# Patient Record
Sex: MALE | Race: WHITE | Employment: OTHER | URBAN - METROPOLITAN AREA
[De-identification: names, ages, dates, MRNs, and addresses within clinical notes are randomized per-mention and may not be internally consistent; named-entity substitution may affect disease eponyms.]

---

## 2017-01-27 ENCOUNTER — OFFICE VISIT (OUTPATIENT)
Dept: ONCOLOGY | Age: 77
End: 2017-01-27

## 2017-01-27 VITALS
WEIGHT: 200 LBS | DIASTOLIC BLOOD PRESSURE: 90 MMHG | TEMPERATURE: 97.6 F | HEART RATE: 65 BPM | BODY MASS INDEX: 28.7 KG/M2 | OXYGEN SATURATION: 99 % | RESPIRATION RATE: 18 BRPM | SYSTOLIC BLOOD PRESSURE: 179 MMHG

## 2017-01-27 DIAGNOSIS — C17.0 CARCINOMA OF DUODENUM (HCC): Primary | ICD-10-CM

## 2017-01-27 NOTE — PROGRESS NOTES
Follow up Note        Patient: Mallorie Bhardwaj MRN: [id number]  SSN: [id number]    YOB: 1940  Age: 64 y.o. Sex: male        Diagnosis:       1. 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 Adjuvant single agent Capecitabine, single agent   S/P 1 cycles, therapy stopped due to severe diarrhea leading to hospitalization  2. Pancreato-duodenectomy on 01/06/2015      Subjective:      Mallorie Bhardwaj is a 64 y.o. 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 He underwent a pancreato-duodenectomy on 01/06/2015. The tumor in the duodenum was found to be 2.5 cm in size and invaded the pancreatic head and 1/15 LN had disease. He stayed in the hospital until Feb 2015 recovering from the surgery. Mr. Juan Francisco Toussaint elected to receive adjuvant chemotherapy in the form of single agent Capecitabine. He suffered severe diarrhea and I had to discontinue therapy early in the course. Mr. Juan Francisco Toussaint overall is doing well but says he has gained weight and is not moving around like he should. He also said he has lost his glasses and his hearing aid.       Review of Systems:    Constitutional: fatigue  Eyes: negative  Ears, Nose, Mouth, Throat, and Face: negative  Respiratory: negative  Cardiovascular: negative  Gastrointestinal: negative  Integument: negative   Hematologic/Lymphatic: negative  Musculoskeletal:negative  Neurological: negative        Past Medical History   Diagnosis Date    Aneurysm ([address]      aortic    Arrhythmia      A-fib    Calculus of kidney     Cancer ([address]     Chronic kidney disease      stone    Diabetes ([address]     Fast heart beat     Former tobacco use     Hypertension     Muscle weakness     Prostate disorder     Stomach pain      Past Surgical History   Procedure Laterality Date    Hx orthopaedic       neck surgery    Upper gi endoscopy,biopsy  11/26/2014          Upper gi endoscopy,ball dil,30mm  12/2/2014          Upper gi endoscopy,biopsy  12/2/2014          Upper gi endoscopy,diagnosis  1/23/2015          Hx other surgical  1/6/2015     Whipple procedure, G tube, J tube for duodenal cancer    Pr abdomen surgery proc unlisted       Whipple Procedure      Family History   Problem Relation Age of Onset    Cancer Mother     Heart Disease Mother     Stroke Father     Cancer Brother     Diabetes Brother     Heart Disease Brother      Social History   Substance Use Topics    Smoking status: Former Smoker     Packs/day: 0.00     Years: 60.00    Smokeless tobacco: Never Used      Comment: pipe and cigar use currently    Alcohol use No      Comment: quit -14      Prior to Admission medications    Medication Sig Start Date End Date Taking? Authorizing Provider   docusate sodium (COLACE) 100 mg capsule Take 100 mg by mouth nightly. Historical Provider   oxyCODONE-acetaminophen (PERCOCET) 5-325 mg per tablet Take 1 Tab by mouth every four (4) hours as needed. Max Daily Amount: 6 Tabs. 5/13/15   Monty Barnes MD   diphenhydrAMINE (BENADRYL) 25 mg capsule Take 25 mg by mouth every six (6) hours as needed for Itching. Historical Provider   lisinopril (PRINIVIL, ZESTRIL) 20 mg tablet Take 20 mg by mouth daily. Historical Provider   ondansetron hcl (ZOFRAN, AS HYDROCHLORIDE,) 4 mg tablet Take 4 mg by mouth every eight (8) hours as needed for Nausea. Historical Provider   therapeutic multivitamin (THERA) tablet Take 1 Tab by mouth daily. Historical Provider   diphenoxylate-atropine (LOMOTIL) 2.5-0.025 mg per tablet Take 1 Tab by mouth four (4) times daily as needed for Diarrhea. Historical Provider   loperamide (IMODIUM) 2 mg capsule Take 2 mg by mouth as needed for Diarrhea. Historical Provider   simvastatin (ZOCOR) 40 mg tablet Take 40 mg by mouth nightly. 3/30/15   Dominique Vences MD   acetaminophen (TYLENOL) 325 mg tablet Take 2 Tabs by mouth every four (4) hours as needed.  2/9/15   Kam Melgoza MD insulin glargine (LANTUS) 100 unit/mL injection 14 units sq daily  Patient taking differently: 10 Units by SubCUTAneous route daily as needed (only when BS > 180). Only when BS >180 2/9/15   Daxa Rice MD   metoprolol (LOPRESSOR) 50 mg tablet 1 Tab by Per J Tube route two (2) times a day. Patient taking differently: Take 50 mg by mouth two (2) times a day. 2/9/15   Daxa Rice MD   pantoprazole (PROTONIX) 40 mg tablet Take 1 Tab by mouth daily. 2/9/15   Daxa Rice MD          No Known Allergies        Objective:     Vitals:    01/27/17 1448   BP: 179/90   Pulse: 65   Resp: 18   Temp: 97.6 °F (36.4 °C)   TempSrc: Oral   SpO2: 99%   Weight: 200 lb (90.7 kg)          Physical Exam:    GENERAL: alert, cooperative  EYE: negative  LYMPHATIC: Cervical, supraclavicular, and axillary nodes normal.   THROAT & NECK: normal and no erythema or exudates noted. LUNG: clear to auscultation bilaterally  HEART: regular rate and rhythm  ABDOMEN: soft, non-tender  EXTREMITIES: no cyanosis or edema  SKIN: Normal.  NEUROLOGIC: negative        CT Results (most recent):    Results from Hospital Encounter encounter on 11/15/16   CT ABD PELV W CONT   Narrative EXAM:  CT ABDOMEN PELVIS WITH CONTRAST    INDICATION:  BL lower quadrant pain, flank pain, hx of duodenal CA status post  Whipple procedure, appendectomy and colon resection. COMPARISON: None. CONTRAST:      mL of Isovue-370. TECHNIQUE:    Multislice helical CT was performed from the diaphragm to the symphysis pubis  during uneventful rapid bolus intravenous contrast administration. Oral contrast     administered. Contiguous 5 mm axial images were reconstructed and lung and  soft tissue windows were generated. Coronal and sagittal reformations were  generated. CT dose reduction was achieved through use of a standardized protocol  tailored for this examination and automatic exposure control for dose  modulation.       FINDINGS:   Lung bases: Clear. Heart: Normal in size. Liver: Normal in size. No mass or biliary dilatation. Pneumobilia consistent  with prior Whipple procedure and stable. Stable tiny hypodensity in the right  hepatic lobe (image 20), too small to characterize, with no new focal liver  abnormalities. Gallbladder: Surgically absent  Spleen: Normal in size. No mass. Pancreas: Status post Whipple procedure with no obvious residual or recurrent  malignancy. Adrenals: Unremarkable. Kidneys: No solid mass, calculus, or hydronephrosis. Stable cysts in both  kidneys. Stomach: Gastrojejunostomy unchanged. Small bowel: No dilatation or wall thickening. Colon: No dilatation or wall thickening. Severe diverticulosis without acute  inflammation obvious. Appendix: Normal.  Peritoneum: No ascites or pneumoperitoneum. Retroperitoneum: Celiac axis lymph node is stable at 1 cm. Portacaval lymph node  is stable at 1.3 cm no obvious new lymphadenopathy. Reproductive organs: Prostate gland enlargement stable. Urinary bladder: No mass or calculus. Bones: Stable changes of AVN in both hips. Impression IMPRESSION:    1. Status post Whipple procedure with no obvious residual or recurrent  malignancy. 2. Severe sigmoid diverticulosis without obvious diverticulitis. 3. Other stable incidentals as described above. Assessment:     1. 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 ECOG PS 0  Intent of therapy - curative    S/P pancreato-duodenectomy on 01/06/2015  Patient elected to pursue systemic adjuvant chemotherapy  Received 1 cycle of single agent Capecitabine   Therapy stopped due to severe toxicity  On observation alone  In remission        Plan:        1. Repeat CT abdomen/pelvis in 6 months  2. Follow-up in 6 months         Signed by: Jyoti Burton MD                     January 29, 2017        CC. Michael Staples MD  CC.  Neptali Anguiano MD

## 2017-07-24 ENCOUNTER — HOSPITAL ENCOUNTER (OUTPATIENT)
Dept: CT IMAGING | Age: 77
Discharge: HOME | End: 2017-07-24
Attending: INTERNAL MEDICINE
Payer: MEDICARE

## 2017-07-24 DIAGNOSIS — C17.0 CARCINOMA OF DUODENUM (HCC): ICD-10-CM

## 2017-07-24 LAB — CREAT BLD-MCNC: 1.3 MG/DL (ref 0.6–1.3)

## 2017-07-24 PROCEDURE — 74011636320 HC RX REV CODE- 636/320: Performed by: INTERNAL MEDICINE

## 2017-07-24 PROCEDURE — 74011250636 HC RX REV CODE- 250/636: Performed by: INTERNAL MEDICINE

## 2017-07-24 PROCEDURE — 82565 ASSAY OF CREATININE: CPT

## 2017-07-24 PROCEDURE — 74177 CT ABD & PELVIS W/CONTRAST: CPT

## 2017-07-24 PROCEDURE — 74011000255 HC RX REV CODE- 255: Performed by: INTERNAL MEDICINE

## 2017-07-24 RX ORDER — SODIUM CHLORIDE 9 MG/ML
50 INJECTION, SOLUTION INTRAVENOUS
Status: COMPLETED | OUTPATIENT
Start: 2017-07-24 | End: 2017-07-24

## 2017-07-24 RX ORDER — SODIUM CHLORIDE 0.9 % (FLUSH) 0.9 %
10 SYRINGE (ML) INJECTION
Status: COMPLETED | OUTPATIENT
Start: 2017-07-24 | End: 2017-07-24

## 2017-07-24 RX ORDER — BARIUM SULFATE 20 MG/ML
900 SUSPENSION ORAL
Status: COMPLETED | OUTPATIENT
Start: 2017-07-24 | End: 2017-07-24

## 2017-07-24 RX ADMIN — SODIUM CHLORIDE 50 ML/HR: 900 INJECTION, SOLUTION INTRAVENOUS at 12:14

## 2017-07-24 RX ADMIN — IOPAMIDOL 100 ML: 755 INJECTION, SOLUTION INTRAVENOUS at 12:14

## 2017-07-24 RX ADMIN — Medication 10 ML: at 12:14

## 2017-07-24 RX ADMIN — BARIUM SULFATE 900 ML: 21 SUSPENSION ORAL at 12:15

## 2017-07-31 ENCOUNTER — DOCUMENTATION ONLY (OUTPATIENT)
Dept: ONCOLOGY | Age: 77
End: 2017-07-31

## 2017-08-04 ENCOUNTER — OFFICE VISIT (OUTPATIENT)
Dept: ONCOLOGY | Age: 77
End: 2017-08-04

## 2017-08-04 VITALS
SYSTOLIC BLOOD PRESSURE: 174 MMHG | DIASTOLIC BLOOD PRESSURE: 94 MMHG | BODY MASS INDEX: 27.06 KG/M2 | OXYGEN SATURATION: 98 % | WEIGHT: 189 LBS | HEIGHT: 70 IN | TEMPERATURE: 97.6 F | HEART RATE: 61 BPM | RESPIRATION RATE: 18 BRPM

## 2017-08-04 DIAGNOSIS — C17.0 CARCINOMA OF DUODENUM (HCC): Primary | ICD-10-CM

## 2017-08-04 NOTE — PROGRESS NOTES
Follow up Note        Patient: Jean Abraham MRN: [id number]  SSN: [id number]    YOB: 1940  Age: 76 y.o. Sex: male        Diagnosis:       1. 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 Adjuvant single agent Capecitabine, single agent   S/P 1 cycles, therapy stopped due to severe diarrhea leading to hospitalization  2. Pancreato-duodenectomy on 01/06/2015      Subjective:      Jean Abraham is a 76 y.o. 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 He underwent a pancreato-duodenectomy on 01/06/2015. The tumor in the duodenum was found to be 2.5 cm in size and invaded the pancreatic head and 1/15 LN had disease. He stayed in the hospital until Feb 2015 recovering from the surgery. Mr. Jorge Ahuja elected to receive adjuvant chemotherapy in the form of single agent Capecitabine. He suffered severe diarrhea and I had to discontinue therapy early in the course. Mr. Jorge Ahuja is doing well and repeat scans show he remains in remission.     Review of Systems:    Constitutional: fatigue  Eyes: negative  Ears, Nose, Mouth, Throat, and Face: negative  Respiratory: negative  Cardiovascular: negative  Gastrointestinal: negative  Integument: negative   Hematologic/Lymphatic: negative  Musculoskeletal:negative  Neurological: negative        Past Medical History:   Diagnosis Date    Aneurysm ([address]     aortic    Arrhythmia     A-fib    Calculus of kidney     Cancer ([address]     Chronic kidney disease     stone    Diabetes ([address]     Fast heart beat     Former tobacco use     Hypertension     Muscle weakness     Prostate disorder     Stomach pain      Past Surgical History:   Procedure Laterality Date    ABDOMEN SURGERY PROC UNLISTED      Whipple Procedure    HX ORTHOPAEDIC      neck surgery    HX OTHER SURGICAL  1/6/2015    Whipple procedure, G tube, J tube for duodenal cancer    UPPER GI ENDOSCOPY,BULMARO DIL,30MM  12/2/2014         UPPER GI ENDOSCOPY,BIOPSY  11/26/2014         UPPER GI ENDOSCOPY,BIOPSY  12/2/2014         UPPER GI ENDOSCOPY,DIAGNOSIS  1/23/2015           Family History   Problem Relation Age of Onset    Cancer Mother     Heart Disease Mother     Stroke Father     Cancer Brother     Diabetes Brother     Heart Disease Brother      Social History   Substance Use Topics    Smoking status: Former Smoker     Packs/day: 0.00     Years: 60.00    Smokeless tobacco: Never Used      Comment: pipe and cigar use currently    Alcohol use No      Comment: quit -14      Prior to Admission medications    Medication Sig Start Date End Date Taking? Authorizing Provider   docusate sodium (COLACE) 100 mg capsule Take 100 mg by mouth nightly. Yes Historical Provider   oxyCODONE-acetaminophen (PERCOCET) 5-325 mg per tablet Take 1 Tab by mouth every four (4) hours as needed. Max Daily Amount: 6 Tabs. 5/13/15  Yes Trisha Tubbs MD   diphenhydrAMINE (BENADRYL) 25 mg capsule Take 25 mg by mouth every six (6) hours as needed for Itching. Yes Historical Provider   lisinopril (PRINIVIL, ZESTRIL) 20 mg tablet Take 20 mg by mouth daily. Yes Historical Provider   ondansetron hcl (ZOFRAN, AS HYDROCHLORIDE,) 4 mg tablet Take 4 mg by mouth every eight (8) hours as needed for Nausea. Yes Historical Provider   therapeutic multivitamin (THERA) tablet Take 1 Tab by mouth daily. Yes Historical Provider   diphenoxylate-atropine (LOMOTIL) 2.5-0.025 mg per tablet Take 1 Tab by mouth four (4) times daily as needed for Diarrhea. Yes Historical Provider   loperamide (IMODIUM) 2 mg capsule Take 2 mg by mouth as needed for Diarrhea. Yes Historical Provider   simvastatin (ZOCOR) 40 mg tablet Take 40 mg by mouth nightly. 3/30/15  Yes Dominique Vences MD   acetaminophen (TYLENOL) 325 mg tablet Take 2 Tabs by mouth every four (4) hours as needed.  2/9/15  Yes Russel Oates MD   insulin glargine (LANTUS) 100 unit/mL injection 14 units sq daily  Patient taking differently: 10 Units by SubCUTAneous route daily as needed (only when BS > 180). Only when BS >180 2/9/15  Yes Soledad Erazo MD   metoprolol (LOPRESSOR) 50 mg tablet 1 Tab by Per J Tube route two (2) times a day. Patient taking differently: Take 50 mg by mouth two (2) times a day. 2/9/15  Yes Soledad Erazo MD   pantoprazole (PROTONIX) 40 mg tablet Take 1 Tab by mouth daily. 2/9/15   Soledad Erazo MD          No Known Allergies        Objective:     Vitals:    08/04/17 1253   BP: (!) 174/94   Pulse: 61   Resp: 18   Temp: 97.6 °F (36.4 °C)   TempSrc: Oral   SpO2: 98%   Weight: 189 lb (85.7 kg)   Height: 5' 10\" (1.778 m)          Physical Exam:    GENERAL: alert, cooperative  EYE: negative  LYMPHATIC: Cervical, supraclavicular, and axillary nodes normal.   THROAT & NECK: normal and no erythema or exudates noted. LUNG: clear to auscultation bilaterally  HEART: regular rate and rhythm  ABDOMEN: soft, non-tender  EXTREMITIES: no cyanosis or edema  SKIN: Normal.  NEUROLOGIC: negative        CT Results (most recent):    Results from Hospital Encounter encounter on 07/24/17   CT ABD PELV W CONT   Narrative INDICATION:  Adenocarcinoma of the duodenum. Surgery:  Whipple. EXAM: Multiple contiguous helically acquired images were obtained through the  abdomen and pelvis following intravenous 100 ccs and oral contrast  administration. Delayed images may be obtained. Coronal and sagittal  reconstructions were performed. CT dose reduction was achieved through the use of a standardized protocol  tailored for this examination and automatic exposure control for dose  modulation. FINDINGS:     Previous studies for comparison:  November 15, 2016. December 1, 2014. Images through the lower lungs reveal no infiltrate or pleural effusion. Images through the liver reveal no space-occupying lesion or biliary dilatation. Stable tiny hypodensity peripherally unchanged. Pneumobilia again noted. Possible fatty infiltration. There is adequate opacification of the hepatic and portal veins. Images through the gallbladder fossa reveal previous cholecystectomy. Evidence consistent with prior Whipple procedure. No definite residual or  recurrent tumor. Small nodes redemonstrated in the upper abdomen. Prominent  lymph node posterior to the main portal vein segment measures 12.5 mm,  previously 12.8 mm. Celiac axis node measures 10.2 mm, previously 11.0 mm. The spleen is normal in size. The pancreas has a normal postoperative morphologic appearance. Bilateral renal function is demonstrated. No hydronephrosis is present. Slightly  indeterminate appearing left renal exophytic structure measuring 2 cm in  diameter and measuring 62 Hounsfield units. Overall size and appearance is not  appreciably changed. The adrenal glands are normal.    The visualized opacified bowel loops are nonobstructed. Severe sigmoid colonic  diverticulosis redemonstrated. The appendix is normal.    Images through the pelvis reveal mild enlargement of prostate gland. Bone windows demonstrate geographic abnormality in involving both femoral heads  suggesting possible AVN. Incidental focal abdominal aortic aneurysm at and just below the level of the  renal arteries measuring 3.4 x 3.2 cm. Impression IMPRESSION:    Probable fatty infiltration of the liver. Status post Whipple procedure. No evidence of residual or recurrent tumor. Mildly prominent upper abdominal lymph nodes unchanged. Moderate to severe sigmoid colonic diverticulosis. Mild prostate gland enlargement. Changes of AVN involving both hips redemonstrated. Small abdominal aortic aneurysm. 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 Slightly indeterminate appearing left renal exophytic structure which is  unchanged since prior study dated December 2014.  Periodic follow-up recommended. Ultrasound characterization may be helpful if indicated clinically. Assessment:     1. 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 ECOG PS 0  Intent of therapy - curative    S/P pancreato-duodenectomy on 01/06/2015  Patient elected to pursue systemic adjuvant chemotherapy  Received 1 cycle of single agent Capecitabine   Therapy stopped due to severe toxicity  On observation alone  In remission    CT Abd Pelv (7/24/2017): In remission    Symptom management form reviewed with patient. Plan:        > Repeat CT abdomen/pelvis in 6 months  > Follow-up in 6 months       I saw the patient in conjunction with Munir Nascimento NP. Signed by: Faina Thakur MD                     August 4, 2017        CC. Laith Meier MD  CC.  Roberto Torres MD

## 2017-09-18 ENCOUNTER — APPOINTMENT (OUTPATIENT)
Dept: CT IMAGING | Age: 77
DRG: 643 | End: 2017-09-18
Attending: EMERGENCY MEDICINE
Payer: MEDICARE

## 2017-09-18 ENCOUNTER — APPOINTMENT (OUTPATIENT)
Dept: GENERAL RADIOLOGY | Age: 77
DRG: 643 | End: 2017-09-18
Attending: EMERGENCY MEDICINE
Payer: MEDICARE

## 2017-09-18 ENCOUNTER — HOSPITAL ENCOUNTER (INPATIENT)
Age: 77
LOS: 5 days | Discharge: NURSING, REHAB OR LONG-TERM CARE | DRG: 643 | End: 2017-09-24
Attending: EMERGENCY MEDICINE | Admitting: INTERNAL MEDICINE
Payer: MEDICARE

## 2017-09-18 DIAGNOSIS — R50.9 FEVER, UNSPECIFIED FEVER CAUSE: Primary | ICD-10-CM

## 2017-09-18 DIAGNOSIS — E78.5 DYSLIPIDEMIA: ICD-10-CM

## 2017-09-18 DIAGNOSIS — G93.40 ENCEPHALOPATHY ACUTE: ICD-10-CM

## 2017-09-18 DIAGNOSIS — R41.82 ALTERED MENTAL STATUS, UNSPECIFIED ALTERED MENTAL STATUS TYPE: ICD-10-CM

## 2017-09-18 DIAGNOSIS — E23.6 PITUITARY MASS (HCC): ICD-10-CM

## 2017-09-18 LAB
ALBUMIN SERPL-MCNC: 3.5 G/DL (ref 3.5–5)
ALBUMIN/GLOB SERPL: 0.9 {RATIO} (ref 1.1–2.2)
ALP SERPL-CCNC: 110 U/L (ref 45–117)
ALT SERPL-CCNC: 75 U/L (ref 12–78)
ANION GAP SERPL CALC-SCNC: 10 MMOL/L (ref 5–15)
APPEARANCE UR: CLEAR
AST SERPL-CCNC: 53 U/L (ref 15–37)
BACTERIA URNS QL MICRO: NEGATIVE /HPF
BASOPHILS # BLD: 0 K/UL (ref 0–0.1)
BASOPHILS NFR BLD: 1 % (ref 0–1)
BILIRUB SERPL-MCNC: 0.5 MG/DL (ref 0.2–1)
BILIRUB UR QL: NEGATIVE
BUN SERPL-MCNC: 17 MG/DL (ref 6–20)
BUN/CREAT SERPL: 17 (ref 12–20)
CALCIUM SERPL-MCNC: 8.3 MG/DL (ref 8.5–10.1)
CHLORIDE SERPL-SCNC: 100 MMOL/L (ref 97–108)
CO2 SERPL-SCNC: 23 MMOL/L (ref 21–32)
COLOR UR: ABNORMAL
CREAT SERPL-MCNC: 0.98 MG/DL (ref 0.7–1.3)
EOSINOPHIL # BLD: 0.1 K/UL (ref 0–0.4)
EOSINOPHIL NFR BLD: 1 % (ref 0–7)
EPITH CASTS URNS QL MICRO: ABNORMAL /LPF
ERYTHROCYTE [DISTWIDTH] IN BLOOD BY AUTOMATED COUNT: 12.7 % (ref 11.5–14.5)
GLOBULIN SER CALC-MCNC: 3.8 G/DL (ref 2–4)
GLUCOSE BLD STRIP.AUTO-MCNC: 150 MG/DL (ref 65–100)
GLUCOSE SERPL-MCNC: 128 MG/DL (ref 65–100)
GLUCOSE UR STRIP.AUTO-MCNC: NEGATIVE MG/DL
HCT VFR BLD AUTO: 39.2 % (ref 36.6–50.3)
HGB BLD-MCNC: 14 G/DL (ref 12.1–17)
HGB UR QL STRIP: NEGATIVE
KETONES UR QL STRIP.AUTO: NEGATIVE MG/DL
LACTATE SERPL-SCNC: 1.1 MMOL/L (ref 0.4–2)
LEUKOCYTE ESTERASE UR QL STRIP.AUTO: NEGATIVE
LIPASE SERPL-CCNC: 92 U/L (ref 73–393)
LYMPHOCYTES # BLD: 1.3 K/UL (ref 0.8–3.5)
LYMPHOCYTES NFR BLD: 15 % (ref 12–49)
MCH RBC QN AUTO: 31.7 PG (ref 26–34)
MCHC RBC AUTO-ENTMCNC: 35.7 G/DL (ref 30–36.5)
MCV RBC AUTO: 88.9 FL (ref 80–99)
MONOCYTES # BLD: 0.9 K/UL (ref 0–1)
MONOCYTES NFR BLD: 10 % (ref 5–13)
NEUTS SEG # BLD: 6.5 K/UL (ref 1.8–8)
NEUTS SEG NFR BLD: 73 % (ref 32–75)
NITRITE UR QL STRIP.AUTO: NEGATIVE
PH UR STRIP: 6.5 [PH] (ref 5–8)
PLATELET # BLD AUTO: 277 K/UL (ref 150–400)
POTASSIUM SERPL-SCNC: 3.6 MMOL/L (ref 3.5–5.1)
PROT SERPL-MCNC: 7.3 G/DL (ref 6.4–8.2)
PROT UR STRIP-MCNC: 300 MG/DL
RBC # BLD AUTO: 4.41 M/UL (ref 4.1–5.7)
RBC #/AREA URNS HPF: ABNORMAL /HPF (ref 0–5)
SERVICE CMNT-IMP: ABNORMAL
SODIUM SERPL-SCNC: 133 MMOL/L (ref 136–145)
SP GR UR REFRACTOMETRY: 1.02 (ref 1–1.03)
UA: UC IF INDICATED,UAUC: ABNORMAL
UROBILINOGEN UR QL STRIP.AUTO: 1 EU/DL (ref 0.2–1)
WBC # BLD AUTO: 8.7 K/UL (ref 4.1–11.1)
WBC URNS QL MICRO: ABNORMAL /HPF (ref 0–4)

## 2017-09-18 PROCEDURE — 81001 URINALYSIS AUTO W/SCOPE: CPT | Performed by: EMERGENCY MEDICINE

## 2017-09-18 PROCEDURE — 93005 ELECTROCARDIOGRAM TRACING: CPT

## 2017-09-18 PROCEDURE — 74011250636 HC RX REV CODE- 250/636: Performed by: EMERGENCY MEDICINE

## 2017-09-18 PROCEDURE — 87040 BLOOD CULTURE FOR BACTERIA: CPT | Performed by: EMERGENCY MEDICINE

## 2017-09-18 PROCEDURE — 74177 CT ABD & PELVIS W/CONTRAST: CPT

## 2017-09-18 PROCEDURE — 85025 COMPLETE CBC W/AUTO DIFF WBC: CPT | Performed by: EMERGENCY MEDICINE

## 2017-09-18 PROCEDURE — 96360 HYDRATION IV INFUSION INIT: CPT

## 2017-09-18 PROCEDURE — 99285 EMERGENCY DEPT VISIT HI MDM: CPT

## 2017-09-18 PROCEDURE — 74011636320 HC RX REV CODE- 636/320: Performed by: EMERGENCY MEDICINE

## 2017-09-18 PROCEDURE — 87804 INFLUENZA ASSAY W/OPTIC: CPT | Performed by: EMERGENCY MEDICINE

## 2017-09-18 PROCEDURE — 94761 N-INVAS EAR/PLS OXIMETRY MLT: CPT

## 2017-09-18 PROCEDURE — 82962 GLUCOSE BLOOD TEST: CPT

## 2017-09-18 PROCEDURE — 96361 HYDRATE IV INFUSION ADD-ON: CPT

## 2017-09-18 PROCEDURE — 83605 ASSAY OF LACTIC ACID: CPT | Performed by: EMERGENCY MEDICINE

## 2017-09-18 PROCEDURE — 36415 COLL VENOUS BLD VENIPUNCTURE: CPT | Performed by: EMERGENCY MEDICINE

## 2017-09-18 PROCEDURE — 83690 ASSAY OF LIPASE: CPT | Performed by: EMERGENCY MEDICINE

## 2017-09-18 PROCEDURE — 71010 XR CHEST PORT: CPT

## 2017-09-18 PROCEDURE — 80053 COMPREHEN METABOLIC PANEL: CPT | Performed by: EMERGENCY MEDICINE

## 2017-09-18 RX ORDER — SULFAMETHOXAZOLE AND TRIMETHOPRIM 800; 160 MG/1; MG/1
1 TABLET ORAL 2 TIMES DAILY
Status: ON HOLD | COMMUNITY
End: 2017-09-21

## 2017-09-18 RX ORDER — MELOXICAM 7.5 MG/1
TABLET ORAL DAILY
Status: ON HOLD | COMMUNITY
End: 2017-09-21

## 2017-09-18 RX ORDER — SODIUM CHLORIDE 9 MG/ML
50 INJECTION, SOLUTION INTRAVENOUS
Status: COMPLETED | OUTPATIENT
Start: 2017-09-18 | End: 2017-09-19

## 2017-09-18 RX ORDER — GUAIFENESIN 100 MG/5ML
81 LIQUID (ML) ORAL DAILY
Status: ON HOLD | COMMUNITY
End: 2017-09-21

## 2017-09-18 RX ORDER — SODIUM CHLORIDE 0.9 % (FLUSH) 0.9 %
5-10 SYRINGE (ML) INJECTION AS NEEDED
Status: DISCONTINUED | OUTPATIENT
Start: 2017-09-18 | End: 2017-09-19

## 2017-09-18 RX ORDER — GLIMEPIRIDE 2 MG/1
2 TABLET ORAL 2 TIMES DAILY
COMMUNITY

## 2017-09-18 RX ORDER — SODIUM CHLORIDE 0.9 % (FLUSH) 0.9 %
10 SYRINGE (ML) INJECTION
Status: COMPLETED | OUTPATIENT
Start: 2017-09-18 | End: 2017-09-18

## 2017-09-18 RX ADMIN — Medication 10 ML: at 22:37

## 2017-09-18 RX ADMIN — SODIUM CHLORIDE 50 ML/HR: 900 INJECTION, SOLUTION INTRAVENOUS at 22:37

## 2017-09-18 RX ADMIN — SODIUM CHLORIDE 2448 ML: 900 INJECTION, SOLUTION INTRAVENOUS at 21:56

## 2017-09-18 RX ADMIN — IOPAMIDOL 100 ML: 755 INJECTION, SOLUTION INTRAVENOUS at 22:37

## 2017-09-18 NOTE — IP AVS SNAPSHOT
[address] [facility] 
[phone] Patient: Lorin Ayala MRN: [id number] [id number] You are allergic to the following No active allergies Immunizations Administered for This Admission Name Date Influenza Vaccine (Quad) PF 9/19/2017 Recent Documentation Height Weight BMI Smoking Status 1.803 m 82 kg 25.21 kg/m2 Former Smoker Unresulted Labs Order Current Status PROLACTIN In process Emergency Contacts  (Rel.) Home Phone Work Phone Mobile Phone Adal Olivo [phone] Adal Olivo (Child) [phone] -- -- About your hospitalization You were admitted on:  September 19, 2017 You last received care in the:  [address] You were discharged on:  September 24, 2017 Why you were hospitalized Your primary diagnosis was:  Not on File Your diagnoses also included:  Fever Providers Seen During Your Hospitalizations Provider Role Specialty Primary office phone Flakita Rojas DO Attending Provider Emergency Medicine [phone] Patti Al MD Attending Provider Internal Medicine [phone] Faith Hanson MD Attending Provider Internal Medicine [phone] Your Primary Care Physician (PCP) Primary Care Physician Office Phone Office Fax Natalya Karen [phone] Follow-up Information Follow up With Details Comments Contact Info Narayan Silva MD Schedule an appointment as soon as possible for a visit in 1 week PCP [facility] 
[phone] Schedule an appointment as soon as possible for a visit in 1 week Vidhya Ramirez MD Schedule an appointment as soon as possible for a visit in 1 week  [address] 
[phone] Devonte Tapia MD Schedule an appointment as soon as possible for a visit in 1 week  [facility] [address] [place] 
[phone] Current Discharge Medication List  
  
CONTINUE these medications which have CHANGED Dose & Instructions Dispensing Information Comments Morning Noon Evening Bedtime  
 aspirin 81 mg chewable tablet What changed:  Another medication with the same name was removed. Continue taking this medication, and follow the directions you see here. Your last dose was: Your next dose is:    
   
   
 Dose:  81 mg Take 1 Tab by mouth daily for 30 days. Quantity:  30 Tab Refills:  1  
     
   
   
   
  
 lisinopril 40 mg tablet Commonly known as:  Tremaine Human What changed:   
- medication strength 
- how much to take Your last dose was: Your next dose is:    
   
   
 Dose:  40 mg Take 1 Tab by mouth daily for 30 days. Quantity:  30 Tab Refills:  1 therapeutic multivitamin tablet Commonly known as:  THERA What changed:  Another medication with the same name was removed. Continue taking this medication, and follow the directions you see here. Your last dose was: Your next dose is:    
   
   
 Dose:  1 Tab Take 1 Tab by mouth daily for 30 days. Quantity:  30 Tab Refills:  1 CONTINUE these medications which have NOT CHANGED Dose & Instructions Dispensing Information Comments Morning Noon Evening Bedtime  
 acetaminophen 325 mg tablet Commonly known as:  TYLENOL Your last dose was: Your next dose is:    
   
   
 Dose:  650 mg Take 2 Tabs by mouth every four (4) hours as needed. Quantity:  1 Tab Refills:  0  
     
   
   
   
  
 BENADRYL 25 mg capsule Generic drug:  diphenhydrAMINE Your last dose was:     
   
Your next dose is:    
   
   
 Dose:  25 mg  
 Take 25 mg by mouth every six (6) hours as needed for Sleep. Refills:  0  
     
   
   
   
  
 glimepiride 2 mg tablet Commonly known as:  AMARYL Your last dose was: Your next dose is:    
   
   
 Dose:  2 mg Take 2 mg by mouth every morning. Refills:  0 LOPRESSOR 50 mg tablet Generic drug:  metoprolol tartrate Your last dose was: Your next dose is:    
   
   
 Dose:  50 mg Take 50 mg by mouth two (2) times a day. Refills:  0  
     
   
   
   
  
 pantoprazole 40 mg tablet Commonly known as:  PROTONIX Your last dose was: Your next dose is:    
   
   
 Dose:  40 mg Take 1 Tab by mouth daily. Quantity:  30 Tab Refills:  0  
     
   
   
   
  
 simvastatin 40 mg tablet Commonly known as:  ZOCOR Your last dose was: Your next dose is:    
   
   
 Dose:  40 mg Take 40 mg by mouth nightly. Refills:  0 Where to Get Your Medications These medications were sent to [address] [place] [address] [place], [address]  [address] [facility], [address] [place] [id number] Phone:  [phone] therapeutic multivitamin tablet Information on where to get these meds will be given to you by the nurse or doctor. ! Ask your nurse or doctor about these medications  
  aspirin 81 mg chewable tablet  
 lisinopril 40 mg tablet Discharge Instructions Diet: Heart Health Activity: As tolerated Please come back to ED if symptoms get worse. Discharge Orders None Introducing [facility]! Trenton Ceron introduces [employer] patient portal. Now you can access parts of your medical record, email your doctor's office, and request medication refills online. 1. In your internet browser, go to [email or web address] 2. Click on the First Time User? Click Here link in the Sign In box.  You will see the New Member Sign Up page. 3. Enter your [employer] Access Code exactly as it appears below. You will not need to use this code after youve completed the sign-up process. If you do not sign up before the expiration date, you must request a new code. · [employer] Access Code: [email or web address] Expires: 9/24/2017  2:30 PM 
 
4. Enter the last four digits of your Social Security Number (xxxx) and Date of Birth (mm/dd/yyyy) as indicated and click Submit. You will be taken to the next sign-up page. 5. Create a [employer] ID. This will be your [employer] login ID and cannot be changed, so think of one that is secure and easy to remember. 6. Create a [employer] password. You can change your password at any time. 7. Enter your Password Reset Question and Answer. This can be used at a later time if you forget your password. 8. Enter your e-mail address. You will receive e-mail notification when new information is available in [address] 9. Click Sign Up. You can now view and download portions of your medical record. 10. Click the Download Summary menu link to download a portable copy of your medical information. If you have questions, please visit the Frequently Asked Questions section of the [employer] website. Remember, [employer] is NOT to be used for urgent needs. For medical emergencies, dial 911. Now available from your iPhone and Android! General Information Please provide this summary of care documentation to your next provider. Patient Signature:  ____________________________________________________________ Date:  ____________________________________________________________  
  
Mikayla Walker Provider Signature:  ____________________________________________________________ Date:  ____________________________________________________________

## 2017-09-19 ENCOUNTER — APPOINTMENT (OUTPATIENT)
Dept: CT IMAGING | Age: 77
DRG: 643 | End: 2017-09-19
Attending: INTERNAL MEDICINE
Payer: MEDICARE

## 2017-09-19 PROBLEM — R50.9 FEVER: Status: ACTIVE | Noted: 2017-09-19

## 2017-09-19 LAB
ATRIAL RATE: 84 BPM
BASOPHILS # BLD: 0 K/UL (ref 0–0.1)
BASOPHILS NFR BLD: 0 % (ref 0–1)
CALCULATED P AXIS, ECG09: 101 DEGREES
CALCULATED R AXIS, ECG10: 54 DEGREES
CALCULATED T AXIS, ECG11: 67 DEGREES
DIAGNOSIS, 93000: NORMAL
EOSINOPHIL # BLD: 0 K/UL (ref 0–0.4)
EOSINOPHIL NFR BLD: 1 % (ref 0–7)
ERYTHROCYTE [DISTWIDTH] IN BLOOD BY AUTOMATED COUNT: 12.9 % (ref 11.5–14.5)
FLUAV AG NPH QL IA: NEGATIVE
FLUBV AG NOSE QL IA: NEGATIVE
GLUCOSE BLD STRIP.AUTO-MCNC: 134 MG/DL (ref 65–100)
GLUCOSE BLD STRIP.AUTO-MCNC: 155 MG/DL (ref 65–100)
GLUCOSE BLD STRIP.AUTO-MCNC: 161 MG/DL (ref 65–100)
GLUCOSE BLD STRIP.AUTO-MCNC: 204 MG/DL (ref 65–100)
HCT VFR BLD AUTO: 35.8 % (ref 36.6–50.3)
HGB BLD-MCNC: 12.2 G/DL (ref 12.1–17)
LYMPHOCYTES # BLD: 1.9 K/UL (ref 0.8–3.5)
LYMPHOCYTES NFR BLD: 27 % (ref 12–49)
MCH RBC QN AUTO: 30.4 PG (ref 26–34)
MCHC RBC AUTO-ENTMCNC: 34.1 G/DL (ref 30–36.5)
MCV RBC AUTO: 89.3 FL (ref 80–99)
MONOCYTES # BLD: 0.8 K/UL (ref 0–1)
MONOCYTES NFR BLD: 12 % (ref 5–13)
NEUTS SEG # BLD: 4.2 K/UL (ref 1.8–8)
NEUTS SEG NFR BLD: 60 % (ref 32–75)
P-R INTERVAL, ECG05: 206 MS
PLATELET # BLD AUTO: 274 K/UL (ref 150–400)
Q-T INTERVAL, ECG07: 400 MS
QRS DURATION, ECG06: 84 MS
QTC CALCULATION (BEZET), ECG08: 472 MS
RBC # BLD AUTO: 4.01 M/UL (ref 4.1–5.7)
SERVICE CMNT-IMP: ABNORMAL
VENTRICULAR RATE, ECG03: 84 BPM
WBC # BLD AUTO: 7 K/UL (ref 4.1–11.1)

## 2017-09-19 PROCEDURE — 74011250636 HC RX REV CODE- 250/636: Performed by: GENERAL ACUTE CARE HOSPITAL

## 2017-09-19 PROCEDURE — 74011636637 HC RX REV CODE- 636/637: Performed by: GENERAL ACUTE CARE HOSPITAL

## 2017-09-19 PROCEDURE — 36415 COLL VENOUS BLD VENIPUNCTURE: CPT | Performed by: GENERAL ACUTE CARE HOSPITAL

## 2017-09-19 PROCEDURE — 90471 IMMUNIZATION ADMIN: CPT

## 2017-09-19 PROCEDURE — 90686 IIV4 VACC NO PRSV 0.5 ML IM: CPT | Performed by: GENERAL ACUTE CARE HOSPITAL

## 2017-09-19 PROCEDURE — 65270000015 HC RM PRIVATE ONCOLOGY

## 2017-09-19 PROCEDURE — 99218 HC RM OBSERVATION: CPT

## 2017-09-19 PROCEDURE — 74011250637 HC RX REV CODE- 250/637: Performed by: GENERAL ACUTE CARE HOSPITAL

## 2017-09-19 PROCEDURE — 82962 GLUCOSE BLOOD TEST: CPT

## 2017-09-19 PROCEDURE — 70450 CT HEAD/BRAIN W/O DYE: CPT

## 2017-09-19 PROCEDURE — 85025 COMPLETE CBC W/AUTO DIFF WBC: CPT | Performed by: GENERAL ACUTE CARE HOSPITAL

## 2017-09-19 RX ORDER — ENOXAPARIN SODIUM 100 MG/ML
40 INJECTION SUBCUTANEOUS DAILY
Status: DISCONTINUED | OUTPATIENT
Start: 2017-09-19 | End: 2017-09-24 | Stop reason: HOSPADM

## 2017-09-19 RX ORDER — SODIUM CHLORIDE 0.9 % (FLUSH) 0.9 %
5-10 SYRINGE (ML) INJECTION EVERY 8 HOURS
Status: DISCONTINUED | OUTPATIENT
Start: 2017-09-19 | End: 2017-09-24 | Stop reason: HOSPADM

## 2017-09-19 RX ORDER — ACETAMINOPHEN 325 MG/1
650 TABLET ORAL
Status: DISCONTINUED | OUTPATIENT
Start: 2017-09-19 | End: 2017-09-24 | Stop reason: HOSPADM

## 2017-09-19 RX ORDER — ATORVASTATIN CALCIUM 40 MG/1
40 TABLET, FILM COATED ORAL
Status: DISCONTINUED | OUTPATIENT
Start: 2017-09-19 | End: 2017-09-24 | Stop reason: HOSPADM

## 2017-09-19 RX ORDER — THERA TABS 400 MCG
1 TAB ORAL DAILY
Status: DISCONTINUED | OUTPATIENT
Start: 2017-09-19 | End: 2017-09-24 | Stop reason: HOSPADM

## 2017-09-19 RX ORDER — LISINOPRIL 20 MG/1
20 TABLET ORAL DAILY
Status: DISCONTINUED | OUTPATIENT
Start: 2017-09-19 | End: 2017-09-21

## 2017-09-19 RX ORDER — GUAIFENESIN 100 MG/5ML
81 LIQUID (ML) ORAL DAILY
Status: DISCONTINUED | OUTPATIENT
Start: 2017-09-19 | End: 2017-09-24 | Stop reason: HOSPADM

## 2017-09-19 RX ORDER — DEXTROSE 50 % IN WATER (D50W) INTRAVENOUS SYRINGE
12.5-25 AS NEEDED
Status: DISCONTINUED | OUTPATIENT
Start: 2017-09-19 | End: 2017-09-23 | Stop reason: CLARIF

## 2017-09-19 RX ORDER — DIPHENHYDRAMINE HCL 25 MG
25 CAPSULE ORAL
Status: DISCONTINUED | OUTPATIENT
Start: 2017-09-19 | End: 2017-09-24 | Stop reason: HOSPADM

## 2017-09-19 RX ORDER — INSULIN LISPRO 100 [IU]/ML
INJECTION, SOLUTION INTRAVENOUS; SUBCUTANEOUS
Status: DISCONTINUED | OUTPATIENT
Start: 2017-09-19 | End: 2017-09-20

## 2017-09-19 RX ORDER — SODIUM CHLORIDE 0.9 % (FLUSH) 0.9 %
5-10 SYRINGE (ML) INJECTION AS NEEDED
Status: DISCONTINUED | OUTPATIENT
Start: 2017-09-19 | End: 2017-09-24 | Stop reason: HOSPADM

## 2017-09-19 RX ORDER — MAGNESIUM SULFATE 100 %
4 CRYSTALS MISCELLANEOUS AS NEEDED
Status: DISCONTINUED | OUTPATIENT
Start: 2017-09-19 | End: 2017-09-24 | Stop reason: HOSPADM

## 2017-09-19 RX ADMIN — THERA TABS 1 TABLET: TAB at 08:19

## 2017-09-19 RX ADMIN — ATORVASTATIN CALCIUM 40 MG: 40 TABLET, FILM COATED ORAL at 21:49

## 2017-09-19 RX ADMIN — INSULIN LISPRO 3 UNITS: 100 INJECTION, SOLUTION INTRAVENOUS; SUBCUTANEOUS at 16:16

## 2017-09-19 RX ADMIN — INFLUENZA VIRUS VACCINE 0.5 ML: 15; 15; 15; 15 SUSPENSION INTRAMUSCULAR at 08:20

## 2017-09-19 RX ADMIN — ENOXAPARIN SODIUM 40 MG: 40 INJECTION SUBCUTANEOUS at 08:20

## 2017-09-19 RX ADMIN — Medication 10 ML: at 21:49

## 2017-09-19 RX ADMIN — ASPIRIN 81 MG CHEWABLE TABLET 81 MG: 81 TABLET CHEWABLE at 08:19

## 2017-09-19 RX ADMIN — Medication 10 ML: at 14:04

## 2017-09-19 RX ADMIN — Medication 10 ML: at 05:25

## 2017-09-19 RX ADMIN — LISINOPRIL 20 MG: 20 TABLET ORAL at 08:25

## 2017-09-19 RX ADMIN — ACETAMINOPHEN 650 MG: 325 TABLET ORAL at 03:31

## 2017-09-19 RX ADMIN — DIPHENHYDRAMINE HYDROCHLORIDE 25 MG: 25 CAPSULE ORAL at 21:55

## 2017-09-19 NOTE — ED NOTES
Hourly rounds completed. Concerns and questions addressed at this time. Pt in no acute distress at this time. Call bell within reach. Side rails x 2. Cardiac Monitor x 3. Stretcher locked in lowest position. Pt turns self.

## 2017-09-19 NOTE — ED NOTES
Hourly rounds completed. Concerns and questions addressed at this time. Pt in no acute distress at this time. Call bell within reach. Side rails x 2. Cardiac Monitor x 3. Stretcher locked in lowest position. Pt turns self in bed.

## 2017-09-19 NOTE — PROGRESS NOTES
TRANSFER - IN REPORT:    Verbal report received from Amber(name) on Tashia Valdes  being received from ED(unit) for routine progression of care      Report consisted of patients Situation, Background, Assessment and   Recommendations(SBAR). Information from the following report(s) SBAR, Kardex, Intake/Output, MAR and Recent Results was reviewed with the receiving nurse. Opportunity for questions and clarification was provided. Assessment completed upon patients arrival to unit and care assumed.      Primary Nurse Adaline Hashimoto and Louisa Crigler, RN performed a dual skin assessment on this patient No impairment noted  Brock score is 20

## 2017-09-19 NOTE — ED NOTES
TRANSFER - OUT REPORT:    Verbal report given to Angelica Nava RN on Merari Pollard  being transferred to [address] [place], [place] [id number] Oncology  for routine progression of care       Report consisted of patients Situation, Background, Assessment and   Recommendations(SBAR). Information from the following report(s) SBAR, ED Summary, [facility] and Recent Results was reviewed with the receiving nurse. Lines:   Peripheral IV 09/18/17 Left Hand (Active)   Site Assessment Clean, dry, & intact 9/18/2017  9:14 PM   Infiltration Assessment 0 9/18/2017  9:14 PM   Dressing Status Clean, dry, & intact; Occlusive 9/18/2017  9:14 PM   Dressing Type Transparent 9/18/2017  9:14 PM   Hub Color/Line Status Pink 9/18/2017  9:14 PM       Peripheral IV 09/18/17 Right Forearm (Active)   Site Assessment Clean, dry, & intact 9/18/2017 10:26 PM   Phlebitis Assessment 0 9/18/2017 10:26 PM   Infiltration Assessment 0 9/18/2017 10:26 PM   Dressing Status Clean, dry, & intact 9/18/2017 10:26 PM        Opportunity for questions and clarification was provided.       Patient transported with:   [employer]

## 2017-09-19 NOTE — ED NOTES
Pt provided with lean cuisine and orange juice. Hourly rounds completed. Concerns and questions addressed at this time. Pt in no acute distress at this time. Call bell within reach. Side rails x 2. Cardiac Monitor x 2. Stretcher locked in lowest position.

## 2017-09-19 NOTE — ED PROVIDER NOTES
HPI Comments: Christian Mike is a 76 y.o. 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 Pt reports his son brought him to the ED, but states he is unsure why. He notes having abdominal pain. Per son, pt complained of nausea and weakness since last night. Son notes pt presenting with AMS, stating pt put on dirty socks. Son denies pt having any one sided weakness, slurred speech or facial droop. Social hx: -(former) Tobacco use, - EtOH use, - Illicit drug use    PCP: Michael Phillips MD    History of present illness is limited due to pt's AMS. The history is provided by the patient and a relative. No  was used. Past Medical History:   Diagnosis Date    Aneurysm ([address]     aortic    Arrhythmia     A-fib    Calculus of kidney     Cancer ([address]     Chronic kidney disease     stone    Diabetes ([address]     Fast heart beat     Former tobacco use     Hypertension     Muscle weakness     Prostate disorder     Stomach pain        Past Surgical History:   Procedure Laterality Date    ABDOMEN SURGERY PROC UNLISTED      Whipple Procedure    HX ORTHOPAEDIC      neck surgery    HX OTHER SURGICAL  1/6/2015    Whipple procedure, G tube, J tube for duodenal cancer    UPPER GI ENDOSCOPY,BALL DIL,30MM  12/2/2014         UPPER GI ENDOSCOPY,BIOPSY  11/26/2014         UPPER GI ENDOSCOPY,BIOPSY  12/2/2014         UPPER GI ENDOSCOPY,DIAGNOSIS  1/23/2015              Family History:   Problem Relation Age of Onset    Cancer Mother     Heart Disease Mother     Stroke Father     Cancer Brother     Diabetes Brother     Heart Disease Brother        Social History     Social History    Marital status:      Spouse name: N/A    Number of children: N/A    Years of education: N/A     Occupational History    Not on file.      Social History Main Topics    Smoking status: Former Smoker     Packs/day: 0.00     Years: 60.00    Smokeless tobacco: Never Used      Comment: pipe and cigar use currently    Alcohol use No      Comment: quit -14    Drug use: No    Sexual activity: Not on file     Other Topics Concern    Not on file     Social History Narrative         ALLERGIES: Review of patient's allergies indicates no known allergies. Review of Systems   Unable to perform ROS: Mental status change       Patient Vitals for the past 12 hrs:   Temp Pulse Resp BP SpO2   09/19/17 2251 98.9 °F (37.2 °C) 67 16 158/83 100 %   09/19/17 1937 99.6 °F (37.6 °C) 64 16 116/58 95 %   09/19/17 1508 98.8 °F (37.1 °C) 73 16 102/61 95 %       Physical Exam   Constitutional: He appears well-developed and well-nourished. Rectal temp 101.4F   HENT:   Head: Normocephalic and atraumatic. Mouth/Throat: Oropharynx is clear and moist.   Eyes: Conjunctivae and EOM are normal. Pupils are equal, round, and reactive to light. Neck: Normal range of motion. Neck supple. No tracheal deviation present. Cardiovascular: Normal rate, regular rhythm, normal heart sounds and intact distal pulses. No murmur heard. Pulmonary/Chest: Effort normal. No respiratory distress. He has no wheezes. He has no rales. Diminished lung sounds on the left. Abdominal: Soft. Bowel sounds are normal. There is no rebound and no guarding. Mild abdominal tenderness. Surgical scar to center. Musculoskeletal: He exhibits no edema or deformity. Neurological: He is alert. GCS eye subscore is 4. GCS verbal subscore is 5. GCS motor subscore is 6. Oriented to self and place, not situation. Skin: Skin is warm and dry. Psychiatric: He has a normal mood and affect. His speech is normal.   Nursing note and vitals reviewed.        MDM  Number of Diagnoses or Management Options  Encephalopathy acute:   Fever, unspecified fever cause:   Diagnosis management comments: DDx: intraabdominal infection, PNA, UTI, doubt meningitis, no evidence of cellulitis on PE      No obvious source of infection on work up. CT imaging unremarkable. Patient's mental status improved. He cannot be safely discharged to home as he lives at home alone, and has a fever that has caused some changes in mental status. Will check flu, ask hospitalist to observe. Amount and/or Complexity of Data Reviewed  Clinical lab tests: reviewed and ordered  Tests in the radiology section of CPT®: ordered and reviewed  Tests in the medicine section of CPT®: ordered and reviewed  Obtain history from someone other than the patient: yes (son)  Review and summarize past medical records: yes  Discuss the patient with other providers: yes (Hospitalist)    Patient Progress  Patient progress: stable    ED Course       Procedures     EKG interpretation: (Preliminary) 21:35  Rhythm: normal sinus rhythm; and regular . Rate (approx.): 84 bpm; Axis: normal; WI interval: normal; QRS interval: normal ; ST/T wave: nml; no ischemic changes. CONSULT NOTE:   11:24 PM  Day Zero ProjectDO spoke with Dr. Geoffrey Barbosa,  Specialty: hospitalist  Discussed pt's hx, disposition, and available diagnostic and imaging results. Reviewed care plans. Consultant agrees with plans as outlined. Dr. Geoffrey Barbosa will admit pt. Written by Fredis Gunn ED Scribe, as dictated by Day Zero ProjectDO.    LABORATORY TESTS:  Recent Results (from the past 12 hour(s))   GLUCOSE, POC    Collection Time: 09/19/17  4:08 PM   Result Value Ref Range    Glucose (POC) 204 (H) 65 - 100 mg/dL    Performed by Scott POC    Collection Time: 09/19/17  8:44 PM   Result Value Ref Range    Glucose (POC) 161 (H) 65 - 100 mg/dL    Performed by Samira Mosqueda        IMAGING RESULTS:  CT Results  (Last 48 hours)               09/19/17 1339  CT HEAD WO CONT Final result    Impression:  IMPRESSION:    1. 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    2. There are moderate changes small vessel disease periventricular white matter. There are bilateral occipital infarctions right larger than left. Narrative:  EXAM:  CT HEAD WO CONT       INDICATION:   ams       COMPARISON: None. CONTRAST:  None. TECHNIQUE: Unenhanced CT of the head was performed using 5 mm images. Brain and   bone windows were generated. CT dose reduction was achieved through use of a   standardized protocol tailored for this examination and automatic exposure   control for dose modulation. FINDINGS:   There is prominence of the ventricles and sulci. There are moderate changes   small vessel disease periventricular white matter. Bilateral occipital lobe   infarctions right greater than left. 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 No hemorrhage is identified. Bony structures are intact. 09/18/17 2218  CT ABD PELV W CONT Final result    Impression:  IMPRESSION:    There is no acute abnormality in the abdomen or pelvis. Stable   chronic/incidental findings as above. Narrative:  EXAM:  CT ABD PELV W CONT       INDICATION: Mid abdominal pain for a few days. COMPARISON: CT 7/24/2017. TECHNIQUE: Helical CT of the abdomen  and pelvis  following the uneventful   intravenous administration of nonionic contrast.  Coronal and sagittal reformats   are performed. CT dose reduction was achieved through use of a standardized   protocol tailored for this examination and automatic exposure control for dose   modulation. FINDINGS:    The visualized lung bases demonstrate no mass or consolidation. The heart size   is normal. There is coronary artery atherosclerosis. There is no pericardial or   pleural effusion. The liver is diffusely low in attenuation. The spleen and adrenal glands are   normal. The kidneys are symmetric without hydronephrosis.  An anterior left   kidney lesion with density greater than simple fluid is stable in size measuring   1.8 cm since 12/2014. The gall bladder is surgically absent without intra- or   extra-hepatic biliary dilatation. Surgical changes following Whipple procedure are again noted. There are no   dilated bowel loops. The appendix is normal.  There is extensive sigmoid   diverticulosis without focal adjacent inflammation. There are no enlarged lymph   nodes. There is no free fluid or free air. There is stable dilatation of the   infrarenal aorta measuring 3.1 cm in diameter. There is aortoiliac   atherosclerosis. The urinary bladder is normal.  There is no pelvic mass. There is stable   prostate enlargement. There are stable degenerative changes in the hips and spine without aggressive   bony lesion. CXR Results  (Last 48 hours)               09/18/17 2147  XR CHEST PORT Final result    Impression:  IMPRESSION: No acute abnormality identified. Narrative:  EXAM:  XR CHEST PORT       INDICATION:  Sepsis       COMPARISON:  2016       FINDINGS: A portable AP radiograph of the chest was obtained at 2131 hours. .    The lungs are clear. Heart size is within normal limits. Atherosclerotic   changes are noted in aortic arch. .  Bony structures appear intact. Annamary Ripa                   MEDICATIONS GIVEN:  Medications   aspirin chewable tablet 81 mg (81 mg Oral Given 9/19/17 0819)   diphenhydrAMINE (BENADRYL) capsule 25 mg (25 mg Oral Given 9/19/17 2155)   lisinopril (PRINIVIL, ZESTRIL) tablet 20 mg (20 mg Oral Given 9/19/17 0825)   atorvastatin (LIPITOR) tablet 40 mg (40 mg Oral Given 9/19/17 2149)   therapeutic multivitamin [facility]) tablet 1 Tab (1 Tab Oral Given 9/19/17 0819)   sodium chloride (NS) flush 5-10 mL (10 mL IntraVENous Given 9/19/17 2149)   sodium chloride (NS) flush 5-10 mL (not administered)   acetaminophen (TYLENOL) tablet 650 mg (650 mg Oral Given 9/19/17 0331)   enoxaparin (LOVENOX) injection 40 mg (40 mg SubCUTAneous Given 9/19/17 0820)   insulin lispro (HUMALOG) injection (3 Units SubCUTAneous Given 9/19/17 1616)   glucose chewable tablet 16 g (not administered)   dextrose (D50W) injection syrg 12.5-25 g (not administered)   glucagon (GLUCAGEN) injection 1 mg (not administered)   sodium chloride 0.9 % bolus infusion 2,448 mL (0 mL/kg × 81.6 kg IntraVENous IV Completed 9/19/17 0144)   0.9% sodium chloride infusion (0 mL/hr IntraVENous IV Completed 9/19/17 0144)   iopamidol (ISOVUE-370) 76 % injection 100 mL (100 mL IntraVENous Given 9/18/17 2237)   sodium chloride (NS) flush 10 mL (10 mL IntraVENous Given 9/18/17 2237)   influenza vaccine 2017-18 (3 yrs+)(PF) (FLUZONE QUAD/FLUARIX QUAD) injection 0.5 mL (0.5 mL IntraMUSCular Given 9/19/17 0820)       IMPRESSION:  1. Fever, unspecified fever cause    2. Encephalopathy acute        PLAN:  1. Admit to hospital.    11:27 PM  Patient is being admitted to the hospital by Dr. Evangelina Block. The results of their tests and reasons for their admission have been discussed with them and/or available family. They convey agreement and understanding for the need to be admitted and for their admission diagnosis. Consultation has been made with the inpatient physician specialist for hospitalization. This note is prepared by Whitney Denson, acting as Scribe for Luis Bernal DO. Luis Bernal DO: The scribe's documentation has been prepared under my direction and personally reviewed by me in its entirety. I confirm that the note above accurately reflects all work, treatment, procedures, and medical decision making performed by me.

## 2017-09-19 NOTE — PROGRESS NOTES
Oncology Nursing Communication Tool      7:16 AM  9/19/2017     Bedside shift change report given to Tomeka Nickerson RN (incoming nurse) by Scott Valadez (outgoing nurse) on Destin Baker a 76 y.o. male who was admitted on 9/18/2017  8:24 PM. Report included the following information SBAR, Kardex, Intake/Output, MAR and Recent Results. Significant changes during shift: none      Issues for physician to address: none            Code Status: Full Code     Infections: VRE     Allergies: Review of patient's allergies indicates no known allergies.      Current diet: DIET CARDIAC Regular       Pain Controlled [x] yes [] no   Bowel Movement [] yes [x] no   Last Bowel Movement (date)     9/17/17            Vital Signs:   Patient Vitals for the past 12 hrs:   Temp Pulse Resp BP SpO2   09/19/17 0526 98.2 °F (36.8 °C) (!) 59 18 123/74 96 %   09/19/17 0430 - 66 16 103/61 97 %   09/19/17 0401 - 65 20 - 95 %   09/19/17 0355 - 66 15 - 97 %   09/19/17 0333 - 82 18 - 97 %   09/19/17 0316 - 67 16 (!) 164/106 97 %   09/19/17 0245 - 67 17 (!) 138/97 98 %   09/19/17 0230 - 67 14 134/68 96 %   09/19/17 0215 - 68 18 125/69 96 %   09/19/17 0200 - 79 21 (!) 112/38 98 %   09/19/17 0156 98.4 °F (36.9 °C) - - - -   09/19/17 0145 - - - 108/75 95 %   09/19/17 0130 - - - 147/75 96 %   09/19/17 0115 - - - 138/80 95 %   09/19/17 0100 - - - (!) 122/97 97 %   09/19/17 0032 - - - 130/66 96 %   09/19/17 0026 - - - 105/83 96 %   09/19/17 0001 - - - 112/65 96 %   09/18/17 2330 - - - 106/75 98 %   09/18/17 2215 - - - 130/79 95 %   09/18/17 2130 (!) 101.4 °F (38.6 °C) - - 98/65 95 %   09/18/17 2101 99.3 °F (37.4 °C) 91 18 - 96 %   09/18/17 2057 - - - - 96 %   09/18/17 2012 100.1 °F (37.8 °C) 99 18 139/87 96 %      Intake & Output:     Intake/Output Summary (Last 24 hours) at 09/19/17 0716  Last data filed at 09/19/17 0649   Gross per 24 hour   Intake               50 ml   Output              100 ml   Net              -50 ml Laboratory Results:     Recent Results (from the past 12 hour(s))   GLUCOSE, POC    Collection Time: 09/18/17  8:04 PM   Result Value Ref Range    Glucose (POC) 150 (H) 65 - 100 mg/dL    Performed by Carol Rivera    URINALYSIS W/ REFLEX CULTURE    Collection Time: 09/18/17  9:16 PM   Result Value Ref Range    Color YELLOW/STRAW      Appearance CLEAR CLEAR      Specific gravity 1.019 1.003 - 1.030      pH (UA) 6.5 5.0 - 8.0      Protein 300 (A) NEG mg/dL    Glucose NEGATIVE  NEG mg/dL    Ketone NEGATIVE  NEG mg/dL    Bilirubin NEGATIVE  NEG      Blood NEGATIVE  NEG      Urobilinogen 1.0 0.2 - 1.0 EU/dL    Nitrites NEGATIVE  NEG      Leukocyte Esterase NEGATIVE  NEG      WBC 0-4 0 - 4 /hpf    RBC 0-5 0 - 5 /hpf    Epithelial cells FEW FEW /lpf    Bacteria NEGATIVE  NEG /hpf    UA:UC IF INDICATED CULTURE NOT INDICATED BY UA RESULT CNI     METABOLIC PANEL, COMPREHENSIVE    Collection Time: 09/18/17  9:16 PM   Result Value Ref Range    Sodium 133 (L) 136 - 145 mmol/L    Potassium 3.6 3.5 - 5.1 mmol/L    Chloride 100 97 - 108 mmol/L    CO2 23 21 - 32 mmol/L    Anion gap 10 5 - 15 mmol/L    Glucose 128 (H) 65 - 100 mg/dL    BUN 17 6 - 20 MG/DL    Creatinine 0.98 0.70 - 1.30 MG/DL    BUN/Creatinine ratio 17 12 - 20      GFR est AA >60 >60 ml/min/1.73m2    GFR est non-AA >60 >60 ml/min/1.73m2    Calcium 8.3 (L) 8.5 - 10.1 MG/DL    Bilirubin, total 0.5 0.2 - 1.0 MG/DL    ALT (SGPT) 75 12 - 78 U/L    AST (SGOT) 53 (H) 15 - 37 U/L    Alk.  phosphatase 110 45 - 117 U/L    Protein, total 7.3 6.4 - 8.2 g/dL    Albumin 3.5 3.5 - 5.0 g/dL    Globulin 3.8 2.0 - 4.0 g/dL    A-G Ratio 0.9 (L) 1.1 - 2.2     CBC WITH AUTOMATED DIFF    Collection Time: 09/18/17  9:16 PM   Result Value Ref Range    WBC 8.7 4.1 - 11.1 K/uL    RBC 4.41 4.10 - 5.70 M/uL    HGB 14.0 12.1 - 17.0 g/dL    HCT 39.2 36.6 - 50.3 %    MCV 88.9 80.0 - 99.0 FL    MCH 31.7 26.0 - 34.0 PG    MCHC 35.7 30.0 - 36.5 g/dL    RDW 12.7 11.5 - 14.5 %    PLATELET 488 879 - 400 K/uL    NEUTROPHILS 73 32 - 75 %    LYMPHOCYTES 15 12 - 49 %    MONOCYTES 10 5 - 13 %    EOSINOPHILS 1 0 - 7 %    BASOPHILS 1 0 - 1 %    ABS. NEUTROPHILS 6.5 1.8 - 8.0 K/UL    ABS. LYMPHOCYTES 1.3 0.8 - 3.5 K/UL    ABS. MONOCYTES 0.9 0.0 - 1.0 K/UL    ABS. EOSINOPHILS 0.1 0.0 - 0.4 K/UL    ABS. BASOPHILS 0.0 0.0 - 0.1 K/UL   LIPASE    Collection Time: 09/18/17  9:16 PM   Result Value Ref Range    Lipase 92 73 - 393 U/L   LACTIC ACID    Collection Time: 09/18/17  9:16 PM   Result Value Ref Range    Lactic acid 1.1 0.4 - 2.0 MMOL/L   EKG, 12 LEAD, INITIAL    Collection Time: 09/18/17  9:35 PM   Result Value Ref Range    Ventricular Rate 84 BPM    Atrial Rate 84 BPM    P-R Interval 206 ms    QRS Duration 84 ms    Q-T Interval 400 ms    QTC Calculation (Bezet) 472 ms    Calculated P Axis 101 degrees    Calculated R Axis 54 degrees    Calculated T Axis 67 degrees    Diagnosis       Normal sinus rhythm  Nonspecific T wave abnormality  When compared with ECG of 02-MAR-2016 08:56,  KS interval has decreased  Nonspecific T wave abnormality now evident in Anterior leads     INFLUENZA A & B AG (RAPID TEST)    Collection Time: 09/18/17 11:45 PM   Result Value Ref Range    Influenza A Antigen NEGATIVE  NEG      Influenza B Antigen NEGATIVE  NEG     CBC WITH AUTOMATED DIFF    Collection Time: 09/19/17  3:34 AM   Result Value Ref Range    WBC 7.0 4.1 - 11.1 K/uL    RBC 4.01 (L) 4.10 - 5.70 M/uL    HGB 12.2 12.1 - 17.0 g/dL    HCT 35.8 (L) 36.6 - 50.3 %    MCV 89.3 80.0 - 99.0 FL    MCH 30.4 26.0 - 34.0 PG    MCHC 34.1 30.0 - 36.5 g/dL    RDW 12.9 11.5 - 14.5 %    PLATELET 649 757 - 291 K/uL    NEUTROPHILS 60 32 - 75 %    LYMPHOCYTES 27 12 - 49 %    MONOCYTES 12 5 - 13 %    EOSINOPHILS 1 0 - 7 %    BASOPHILS 0 0 - 1 %    ABS. NEUTROPHILS 4.2 1.8 - 8.0 K/UL    ABS. LYMPHOCYTES 1.9 0.8 - 3.5 K/UL    ABS. MONOCYTES 0.8 0.0 - 1.0 K/UL    ABS. EOSINOPHILS 0.0 0.0 - 0.4 K/UL    ABS.  BASOPHILS 0.0 0.0 - 0.1 K/UL Opportunity for questions and clarifications were given to the incoming nurse. Patient's bed is in low position, side rails x2, door open PRN, call bell within reach and patient not in distress.       Jose Luis Lunsford

## 2017-09-19 NOTE — ED NOTES
RN spoke with Dr. Colten Shin. RN updated Dr. Colten Shin that pt is confused on the date, but alert and oriented otherwise. RN updated MD that the pt's son stated pt was disoriented today when putting his socks on and has had generalized weakness with abdominal pain and nausea since last night.

## 2017-09-19 NOTE — PROGRESS NOTES
Oncology Nursing Communication Tool      6:40 PM  9/19/2017     Bedside and Verbal shift change report given to Stan Dickens RN (incoming nurse) by Holland Mccartney (outgoing nurse) on Todd Million a 76 y.o. male who was admitted on 9/18/2017  8:24 PM. Report included the following information SBAR, Kardex, Intake/Output, MAR, Accordion and Recent Results. Significant changes during shift: Went for CT      Issues for physician to address: None            Code Status: Full Code     Infections: VRE     Allergies: Review of patient's allergies indicates no known allergies. Current diet: DIET CARDIAC Regular       Pain Controlled [x] yes [] no   Bowel Movement [] yes [x] no   Last Bowel Movement (date)     09/18/17            Vital Signs:   Patient Vitals for the past 12 hrs:   Temp Pulse Resp BP SpO2   09/19/17 1508 98.8 °F (37.1 °C) 73 16 102/61 95 %   09/19/17 0824 - 62 - - -   09/19/17 0729 97.9 °F (36.6 °C) (!) 58 16 110/61 (!) 16 %      Intake & Output:     Intake/Output Summary (Last 24 hours) at 09/19/17 1840  Last data filed at 09/19/17 1710   Gross per 24 hour   Intake               50 ml   Output             1250 ml   Net            -1200 ml      Laboratory Results:     Recent Results (from the past 12 hour(s))   GLUCOSE, POC    Collection Time: 09/19/17  7:40 AM   Result Value Ref Range    Glucose (POC) 134 (H) 65 - 100 mg/dL    Performed by Puma Sharpe    GLUCOSE, POC    Collection Time: 09/19/17 11:34 AM   Result Value Ref Range    Glucose (POC) 155 (H) 65 - 100 mg/dL    Performed by Puma Sharpe    GLUCOSE, POC    Collection Time: 09/19/17  4:08 PM   Result Value Ref Range    Glucose (POC) 204 (H) 65 - 100 mg/dL    Performed by Julissa HummelPro[place] Amaya [facility] questions and clarifications were given to the incoming nurse. Patient's bed is in low position, side rails x2, door open PRN, call bell within reach and patient not in distress.       Holland Mccartney

## 2017-09-19 NOTE — H&P
Hospitalist Admission Note    NAME: Whitney Sanchez   :  1940   MRN:  [id number]     Date/Time:  2017 2:18 AM    Patient PCP: Kirk Cobb MD  ________________________________________________________________________    My assessment of this patient's clinical condition and my plan of care is as follows. Assessment / Plan:  AMS, fever, now resolved  -Pt was brought in with his son, who unfortunately cannot be reached right now [[phone]] so exact nature of AMS is unclear. As per the ED, the pt was acting inappropriately and attempting to put on dirty clothes  -The pt however denies all of this and believes that he is doing well  -Fever of 101. 4F was noted, and Tylenol was given  -No clear source of infection determined, UA neg, WBC 8.7, CXR neg, CT Abdo/Pelvis w/ contrast negative for acute abnormality. No meningeal signs, no headache, no photophobia, AOx3. Flu negative as well  -Therefore unclear etiology of AMS and fever; will repeat labs and observe    DMII  -AMS secondary to hypoglycemic episode? No record of FS during event  -Monitor ac qhs  -Sliding scale, hold oral meds    HTN  -Continue home meds    Hx of Duodenal Ca s/p surgical resection    Code Status: Full  Surrogate Decision Maker: Son    DVT Prophylaxis: Lovenox  GI Prophylaxis: not indicated    Baseline: Independent        Subjective:   CHIEF COMPLAINT: ams    HISTORY OF PRESENT ILLNESS:     Uan Alcala is a 76 y.o.  male who presents with CC listed above. Pt.'s symptoms were noted by his son as per the EMS reports, however he is not at bedside and initial call placed did not elicit a response. As per the pt himself, he feels that he is well. He is AOx3. He states that he lives alone, has a cane at bedside if he needs to use it, and that he can take care of himself. He denies any fever, chills, nausea, vomiting, photophobia, neck stiffness, cough, chest pain, diarrhea or syncope.      We were asked to admit for work up and evaluation of the above problems. Past Medical History:   Diagnosis Date    Aneurysm ([facility]     aortic    Arrhythmia     A-fib    Calculus of kidney     Cancer ([facility]     Chronic kidney disease     stone    Diabetes ([facility]     Fast heart beat     Former tobacco use     Hypertension     Muscle weakness     Prostate disorder     Stomach pain         Past Surgical History:   Procedure Laterality Date    ABDOMEN SURGERY PROC UNLISTED      Whipple Procedure    HX ORTHOPAEDIC      neck surgery    HX OTHER SURGICAL  1/6/2015    Whipple procedure, G tube, J tube for duodenal cancer    UPPER GI ENDOSCOPY,BALL DIL,30MM  12/2/2014         UPPER GI ENDOSCOPY,BIOPSY  11/26/2014         UPPER GI ENDOSCOPY,BIOPSY  12/2/2014         UPPER GI ENDOSCOPY,DIAGNOSIS  1/23/2015            Social History   Substance Use Topics    Smoking status: Former Smoker     Packs/day: 0.00     Years: 60.00    Smokeless tobacco: Never Used      Comment: pipe and cigar use currently    Alcohol use No      Comment: quit -14        Family History   Problem Relation Age of Onset    Cancer Mother     Heart Disease Mother     Stroke Father     Cancer Brother     Diabetes Brother     Heart Disease Brother      No Known Allergies     Prior to Admission medications    Medication Sig Start Date End Date Taking? Authorizing Provider   glimepiride (AMARYL) 2 mg tablet Take 2 mg by mouth every morning. Yes Dominique Vences MD   meloxicam (MOBIC) 7.5 mg tablet Take  by mouth daily. Yes Dominique Vences MD   aspirin 81 mg chewable tablet Take 81 mg by mouth daily. Yes Dominique Vences MD   trimethoprim-sulfamethoxazole (BACTRIM DS) 160-800 mg per tablet Take 1 Tab by mouth two (2) times a day. Yes Dominique Vences MD   lisinopril (PRINIVIL, ZESTRIL) 20 mg tablet Take 20 mg by mouth daily.    Yes Historical Provider   ondansetron hcl (ZOFRAN, AS HYDROCHLORIDE,) 4 mg tablet Take 4 mg by mouth every eight (8) hours as needed for Nausea. Yes Historical Provider   therapeutic multivitamin (THERA) tablet Take 1 Tab by mouth daily. Yes Historical Provider   simvastatin (ZOCOR) 40 mg tablet Take 40 mg by mouth nightly. 3/30/15  Yes Dominique Vences MD   acetaminophen (TYLENOL) 325 mg tablet Take 2 Tabs by mouth every four (4) hours as needed. 2/9/15  Yes Paulina Islas MD   metoprolol (LOPRESSOR) 50 mg tablet 1 Tab by Per J Tube route two (2) times a day. Patient taking differently: Take 50 mg by mouth two (2) times a day. 2/9/15  Yes Paulina Islas MD   pantoprazole (PROTONIX) 40 mg tablet Take 1 Tab by mouth daily. 2/9/15  Yes Paulina Islas MD   docusate sodium (COLACE) 100 mg capsule Take 100 mg by mouth nightly. Historical Provider   oxyCODONE-acetaminophen (PERCOCET) 5-325 mg per tablet Take 1 Tab by mouth every four (4) hours as needed. Max Daily Amount: 6 Tabs. 5/13/15   Melissa España MD   diphenhydrAMINE (BENADRYL) 25 mg capsule Take 25 mg by mouth every six (6) hours as needed for Itching. Historical Provider   diphenoxylate-atropine (LOMOTIL) 2.5-0.025 mg per tablet Take 1 Tab by mouth four (4) times daily as needed for Diarrhea. Historical Provider       REVIEW OF SYSTEMS:     I am not able to complete the review of systems because:    The patient is intubated and sedated    The patient has altered mental status due to his acute medical problems    The patient has baseline aphasia from prior stroke(s)    The patient has baseline dementia and is not reliable historian    The patient is in acute medical distress and unable to provide information           Total of 12 systems reviewed as follows:       POSITIVE= underlined text  Negative = text not underlined  General:  fever, chills, sweats, generalized weakness, weight loss/gain,      loss of appetite   Eyes:    blurred vision, eye pain, loss of vision, double vision  ENT:    rhinorrhea, pharyngitis   Respiratory:   cough, sputum production, SOB, EVANS, wheezing, pleuritic pain   Cardiology:   chest pain, palpitations, orthopnea, PND, edema, syncope   Gastrointestinal:  abdominal pain , N/V, diarrhea, dysphagia, constipation, bleeding   Genitourinary:  frequency, urgency, dysuria, hematuria, incontinence   Muskuloskeletal :  arthralgia, myalgia, back pain  Hematology:  easy bruising, nose or gum bleeding, lymphadenopathy   Dermatological: rash, ulceration, pruritis, color change / jaundice  Endocrine:   hot flashes or polydipsia   Neurological:  headache, dizziness, confusion, focal weakness, paresthesia,     Speech difficulties, memory loss, gait difficulty  Psychological: Feelings of anxiety, depression, agitation    Objective:   VITALS:    Visit Vitals    /75    Pulse 91    Temp 98.4 °F (36.9 °C)    Resp 18    Ht 5' 11\" (1.803 m)    Wt 81.6 kg (180 lb)    SpO2 96%    BMI 25.1 kg/m2       PHYSICAL EXAM:    General:    Alert, cooperative, no distress, appears stated age. HEENT: Atraumatic, anicteric sclerae, pink conjunctivae  Neck:  Supple, symmetrical,  thyroid: non tender  Lungs:   Clear to auscultation bilaterally. No Wheezing or Rhonchi. No rales. Chest wall:  No tenderness  No Accessory muscle use. Heart:   Regular  rhythm,  No  murmur   No edema  Abdomen:   Soft, non-tender. Not distended. Bowel sounds normal  Extremities: No cyanosis. No clubbing,      Skin turgor normal, Capillary refill normal, Radial dial pulse 2+  Skin:     Not pale. Not Jaundiced  No rashes   Psych:  Good insight. Not depressed. Not anxious or agitated. Neurologic: EOMs intact. No facial asymmetry. No aphasia or slurred speech. Symmetrical strength, Sensation grossly intact.  Alert and oriented X 4.     _______________________________________________________________________  Care Plan discussed with:    Comments   Patient y    Family      RN y    Care Manager                    Consultant: _______________________________________________________________________  Expected  Disposition:   Home with Family y   HH/PT/OT/RN    SNF/LTC    LYLA    ________________________________________________________________________  TOTAL TIME:  54 Minutes    Critical Care Provided     Minutes non procedure based      Comments    y Reviewed previous records   >50% of visit spent in counseling and coordination of care y Discussion with patient and/or family and questions answered       ________________________________________________________________________  Signed: Sam Grayson MD    Procedures: see electronic medical records for all procedures/Xrays and details which were not copied into this note but were reviewed prior to creation of Plan.     LAB DATA REVIEWED:    Recent Results (from the past 24 hour(s))   GLUCOSE, POC    Collection Time: 09/18/17  8:04 PM   Result Value Ref Range    Glucose (POC) 150 (H) 65 - 100 mg/dL    Performed by Carol Rivera    URINALYSIS W/ REFLEX CULTURE    Collection Time: 09/18/17  9:16 PM   Result Value Ref Range    Color YELLOW/STRAW      Appearance CLEAR CLEAR      Specific gravity 1.019 1.003 - 1.030      pH (UA) 6.5 5.0 - 8.0      Protein 300 (A) NEG mg/dL    Glucose NEGATIVE  NEG mg/dL    Ketone NEGATIVE  NEG mg/dL    Bilirubin NEGATIVE  NEG      Blood NEGATIVE  NEG      Urobilinogen 1.0 0.2 - 1.0 EU/dL    Nitrites NEGATIVE  NEG      Leukocyte Esterase NEGATIVE  NEG      WBC 0-4 0 - 4 /hpf    RBC 0-5 0 - 5 /hpf    Epithelial cells FEW FEW /lpf    Bacteria NEGATIVE  NEG /hpf    UA:UC IF INDICATED CULTURE NOT INDICATED BY UA RESULT CNI     METABOLIC PANEL, COMPREHENSIVE    Collection Time: 09/18/17  9:16 PM   Result Value Ref Range    Sodium 133 (L) 136 - 145 mmol/L    Potassium 3.6 3.5 - 5.1 mmol/L    Chloride 100 97 - 108 mmol/L    CO2 23 21 - 32 mmol/L    Anion gap 10 5 - 15 mmol/L    Glucose 128 (H) 65 - 100 mg/dL    BUN 17 6 - 20 MG/DL    Creatinine 0.98 0.70 - 1.30 MG/DL BUN/Creatinine ratio 17 12 - 20      GFR est AA >60 >60 ml/min/1.73m2    GFR est non-AA >60 >60 ml/min/1.73m2    Calcium 8.3 (L) 8.5 - 10.1 MG/DL    Bilirubin, total 0.5 0.2 - 1.0 MG/DL    ALT (SGPT) 75 12 - 78 U/L    AST (SGOT) 53 (H) 15 - 37 U/L    Alk. phosphatase 110 45 - 117 U/L    Protein, total 7.3 6.4 - 8.2 g/dL    Albumin 3.5 3.5 - 5.0 g/dL    Globulin 3.8 2.0 - 4.0 g/dL    A-G Ratio 0.9 (L) 1.1 - 2.2     CBC WITH AUTOMATED DIFF    Collection Time: 09/18/17  9:16 PM   Result Value Ref Range    WBC 8.7 4.1 - 11.1 K/uL    RBC 4.41 4.10 - 5.70 M/uL    HGB 14.0 12.1 - 17.0 g/dL    HCT 39.2 36.6 - 50.3 %    MCV 88.9 80.0 - 99.0 FL    MCH 31.7 26.0 - 34.0 PG    MCHC 35.7 30.0 - 36.5 g/dL    RDW 12.7 11.5 - 14.5 %    PLATELET 151 638 - 752 K/uL    NEUTROPHILS 73 32 - 75 %    LYMPHOCYTES 15 12 - 49 %    MONOCYTES 10 5 - 13 %    EOSINOPHILS 1 0 - 7 %    BASOPHILS 1 0 - 1 %    ABS. NEUTROPHILS 6.5 1.8 - 8.0 K/UL    ABS. LYMPHOCYTES 1.3 0.8 - 3.5 K/UL    ABS. MONOCYTES 0.9 0.0 - 1.0 K/UL    ABS. EOSINOPHILS 0.1 0.0 - 0.4 K/UL    ABS.  BASOPHILS 0.0 0.0 - 0.1 K/UL   LIPASE    Collection Time: 09/18/17  9:16 PM   Result Value Ref Range    Lipase 92 73 - 393 U/L   LACTIC ACID    Collection Time: 09/18/17  9:16 PM   Result Value Ref Range    Lactic acid 1.1 0.4 - 2.0 MMOL/L   INFLUENZA A & B AG (RAPID TEST)    Collection Time: 09/18/17 11:45 PM   Result Value Ref Range    Influenza A Antigen NEGATIVE  NEG      Influenza B Antigen NEGATIVE  NEG

## 2017-09-19 NOTE — ED NOTES
Pt transported to oncology with ED tech and ANDRÉS hickman paramedic student. PT in no acute distress. Pt in stable condition.

## 2017-09-19 NOTE — ED NOTES
Assumed care of patient from David Solimane, [address] [place] [address] at bedside with verbal report consisting of situation, background, assessment and recommendations (SBAR). Pt. Is A/O X 4. Pt. Resting comfortably on stretcher in a position of comfort. Call bell is within reach. Side rails x 2. Cardiac monitor x 2. Stretcher locked and in lowest position. Concerns and questions addressed at this time. Pt. Is in no acute distress at this time. Will continue to monitor.

## 2017-09-19 NOTE — PHYSICIAN ADVISORY
Letter of Status Determination:   Recommend hospitalization status upgraded from   OBSERVATION  to INPATIENT  Status     Pt Name:  Lorin Ayala   MR#   [facility] # [id number] /  [id number]   [facility]#  [id number]   [address]  @ [facility]   Hospitalization date  9/18/2017  8:24 PM   Current Attending Physician  Faith Hanson MD   Principal diagnosis  <principal problem not specified>   Fever    Clinicals  76 y.o. y.o  male hospitalized with above diagnosis   This elderly male [de-identified] noted to [address] [place] [address], fever. Infectious workup so far has been negative. His old MRCP shows possible sign hemochromatosis. Pt has had Whippl's procedure for Duodenal CA. CT abdomen showslow attenuation  Of  liver. [place] ([facility]) criteria   Does  NOT apply    STATUS DETERMINATION  This patient is at high risk of adverse events and deterioration based on documented clinical data, comorbid conditions and current acute care course. Mr. Lorin Ayala is expected to meet Inpatient Admission status criteria in accordance with CMS regulation Section 43 .3. Specifically, due to medical necessity the patient's stay is expected to exceed Two Midnights. It is our recommendation that this patient's hospitalization status should be upgraded from  OBSERVATION to INPATIENT status. The final decision of the patient's hospitalization status depends on the attending physician's judgment. Additional comments     Payor: Lulu Herrera / Plan: [address] / Product Type: Medicare /         Mic Mooney MD MPH FACP     Physician Advisor    [address]   President Medical Staff, [address] [place] [address]    Cell  [phone]        [phone]    .

## 2017-09-20 ENCOUNTER — APPOINTMENT (OUTPATIENT)
Dept: MRI IMAGING | Age: 77
DRG: 643 | End: 2017-09-20
Attending: PSYCHIATRY & NEUROLOGY
Payer: MEDICARE

## 2017-09-20 LAB
AMMONIA PLAS-SCNC: 24 UMOL/L
BASOPHILS # BLD: 0 K/UL (ref 0–0.1)
BASOPHILS NFR BLD: 1 % (ref 0–1)
EOSINOPHIL # BLD: 0.1 K/UL (ref 0–0.4)
EOSINOPHIL NFR BLD: 1 % (ref 0–7)
ERYTHROCYTE [DISTWIDTH] IN BLOOD BY AUTOMATED COUNT: 13 % (ref 11.5–14.5)
GLUCOSE BLD STRIP.AUTO-MCNC: 169 MG/DL (ref 65–100)
GLUCOSE BLD STRIP.AUTO-MCNC: 170 MG/DL (ref 65–100)
GLUCOSE BLD STRIP.AUTO-MCNC: 177 MG/DL (ref 65–100)
GLUCOSE BLD STRIP.AUTO-MCNC: 309 MG/DL (ref 65–100)
HCT VFR BLD AUTO: 36.8 % (ref 36.6–50.3)
HGB BLD-MCNC: 12.4 G/DL (ref 12.1–17)
LYMPHOCYTES # BLD: 2 K/UL (ref 0.8–3.5)
LYMPHOCYTES NFR BLD: 31 % (ref 12–49)
MCH RBC QN AUTO: 29.9 PG (ref 26–34)
MCHC RBC AUTO-ENTMCNC: 33.7 G/DL (ref 30–36.5)
MCV RBC AUTO: 88.7 FL (ref 80–99)
MONOCYTES # BLD: 0.7 K/UL (ref 0–1)
MONOCYTES NFR BLD: 11 % (ref 5–13)
NEUTS SEG # BLD: 3.7 K/UL (ref 1.8–8)
NEUTS SEG NFR BLD: 56 % (ref 32–75)
PLATELET # BLD AUTO: 252 K/UL (ref 150–400)
RBC # BLD AUTO: 4.15 M/UL (ref 4.1–5.7)
SERVICE CMNT-IMP: ABNORMAL
T4 FREE SERPL-MCNC: 0.9 NG/DL (ref 0.8–1.5)
TSH SERPL DL<=0.05 MIU/L-ACNC: 1.04 UIU/ML (ref 0.36–3.74)
WBC # BLD AUTO: 6.5 K/UL (ref 4.1–11.1)

## 2017-09-20 PROCEDURE — 65270000015 HC RM PRIVATE ONCOLOGY

## 2017-09-20 PROCEDURE — 82140 ASSAY OF AMMONIA: CPT | Performed by: INTERNAL MEDICINE

## 2017-09-20 PROCEDURE — 84439 ASSAY OF FREE THYROXINE: CPT | Performed by: PSYCHIATRY & NEUROLOGY

## 2017-09-20 PROCEDURE — 85025 COMPLETE CBC W/AUTO DIFF WBC: CPT | Performed by: GENERAL ACUTE CARE HOSPITAL

## 2017-09-20 PROCEDURE — 84146 ASSAY OF PROLACTIN: CPT | Performed by: PSYCHIATRY & NEUROLOGY

## 2017-09-20 PROCEDURE — 74011636637 HC RX REV CODE- 636/637: Performed by: INTERNAL MEDICINE

## 2017-09-20 PROCEDURE — 95816 EEG AWAKE AND DROWSY: CPT | Performed by: PSYCHIATRY & NEUROLOGY

## 2017-09-20 PROCEDURE — 74011250636 HC RX REV CODE- 250/636: Performed by: GENERAL ACUTE CARE HOSPITAL

## 2017-09-20 PROCEDURE — 84443 ASSAY THYROID STIM HORMONE: CPT | Performed by: PSYCHIATRY & NEUROLOGY

## 2017-09-20 PROCEDURE — 83001 ASSAY OF GONADOTROPIN (FSH): CPT | Performed by: PSYCHIATRY & NEUROLOGY

## 2017-09-20 PROCEDURE — 74011250637 HC RX REV CODE- 250/637: Performed by: GENERAL ACUTE CARE HOSPITAL

## 2017-09-20 PROCEDURE — 74011636637 HC RX REV CODE- 636/637: Performed by: GENERAL ACUTE CARE HOSPITAL

## 2017-09-20 PROCEDURE — 36415 COLL VENOUS BLD VENIPUNCTURE: CPT | Performed by: GENERAL ACUTE CARE HOSPITAL

## 2017-09-20 PROCEDURE — 84305 ASSAY OF SOMATOMEDIN: CPT | Performed by: PSYCHIATRY & NEUROLOGY

## 2017-09-20 PROCEDURE — 82962 GLUCOSE BLOOD TEST: CPT

## 2017-09-20 RX ORDER — INSULIN LISPRO 100 [IU]/ML
INJECTION, SOLUTION INTRAVENOUS; SUBCUTANEOUS
Status: DISCONTINUED | OUTPATIENT
Start: 2017-09-20 | End: 2017-09-24 | Stop reason: HOSPADM

## 2017-09-20 RX ADMIN — ATORVASTATIN CALCIUM 40 MG: 40 TABLET, FILM COATED ORAL at 21:16

## 2017-09-20 RX ADMIN — THERA TABS 1 TABLET: TAB at 08:19

## 2017-09-20 RX ADMIN — ENOXAPARIN SODIUM 40 MG: 40 INJECTION SUBCUTANEOUS at 08:19

## 2017-09-20 RX ADMIN — INSULIN LISPRO 2 UNITS: 100 INJECTION, SOLUTION INTRAVENOUS; SUBCUTANEOUS at 08:20

## 2017-09-20 RX ADMIN — Medication 10 ML: at 04:30

## 2017-09-20 RX ADMIN — INSULIN LISPRO 2 UNITS: 100 INJECTION, SOLUTION INTRAVENOUS; SUBCUTANEOUS at 16:27

## 2017-09-20 RX ADMIN — ASPIRIN 81 MG CHEWABLE TABLET 81 MG: 81 TABLET CHEWABLE at 08:19

## 2017-09-20 RX ADMIN — Medication 10 ML: at 21:17

## 2017-09-20 RX ADMIN — Medication 10 ML: at 14:38

## 2017-09-20 RX ADMIN — LISINOPRIL 20 MG: 20 TABLET ORAL at 08:19

## 2017-09-20 RX ADMIN — DIPHENHYDRAMINE HYDROCHLORIDE 25 MG: 25 CAPSULE ORAL at 21:16

## 2017-09-20 NOTE — CONSULTS
NEUROLOGY NOTE     DATE OF CONSULTATION: 9/20/2017    CONSULTED BY: Alison Andino MD    Chief Complaint   Patient presents with    Abdominal Pain     mid abdominal pain x a few days.  Fatigue     Pt's son reports pt was up last night and has been feeling nauseous and weak since last night,. Pt's son reports that pt was putting on socks, but he was acting disoriented and putting on the dirty socks instead of clean socks. Pt denies any one sided weakness. Pt does not present with slurred speech and does not present with facial droop. Pt to triage via wheelchair. Reason for Consult  I have been asked to see the patient in neurological consultation to render advice and opinion regarding altered mental status. HISTORY OF PRESENT ILLNESS  Destin Baker is a 76 y.o. male who presents to the hospital because of altered mental status and pituitary mass. Hx obtained by chart review. Pt states that he was lethargic but did not state any specific history about being confused. Family not at bedside at this time. According to the ER notes \" Per son, pt complained of nausea and weakness since last night. Son notes pt presenting with AMS, stating pt put on dirty socks. Son denies pt having any one sided weakness, slurred speech or facial droop. \"    Pt did have a CT head and it showed a pituitary mass.      ROS  A ten system review of constitutional, cardiovascular, respiratory, musculoskeletal, endocrine, skin, SHEENT, genitourinary, psychiatric and neurologic systems was obtained and is unremarkable except as stated in HPI     PMH  Past Medical History:   Diagnosis Date    Aneurysm ([address]     aortic    Arrhythmia     A-fib    Calculus of kidney     Cancer ([address]     Chronic kidney disease     stone    Diabetes ([address]     Fast heart beat     Former tobacco use     Hypertension     Muscle weakness     Prostate disorder     Stomach pain        FH  Family History   Problem Relation Age of Onset    Cancer Mother     Heart Disease Mother     Stroke Father     Cancer Brother     Diabetes Brother     Heart Disease Brother        [facility]  Social History     Social History    Marital status:      Spouse name: N/A    Number of children: N/A    Years of education: N/A     Social History Main Topics    Smoking status: Former Smoker     Packs/day: 0.00     Years: 60.00    Smokeless tobacco: Never Used      Comment: pipe and cigar use currently    Alcohol use No      Comment: quit -14    Drug use: No    Sexual activity: Not Asked     Other Topics Concern    None     Social History Narrative       ALLERGIES  No Known Allergies    PHYSICAL EXAM  EXAMINATION:   Patient Vitals for the past 24 hrs:   Temp Pulse Resp BP SpO2   09/20/17 0714 98.3 °F (36.8 °C) 89 16 138/68 95 %   09/19/17 2251 98.9 °F (37.2 °C) 67 16 158/83 100 %   09/19/17 1937 99.6 °F (37.6 °C) 64 16 116/58 95 %   09/19/17 1508 98.8 °F (37.1 °C) 73 16 102/61 95 %        General:   General appearance: Pt is in no acute distress   Distal pulses are preserved  Fundoscopic exam: attempted    Neurological Examination:   Mental Status:  AAO x3. Speech is fluent. Follows commands, has normal fund of knowledge, attention, short term recall, comprehension and insight. Cranial Nerves: Visual fields are full. PERRL, Extraocular movements are full. Facial sensation intact V1- V3. Facial movement intact, symmetric. Hearing intact to conversation. Palate elevates symmetrically. Shoulder shrug symmetric. Tongue midline. Motor: Strength is 5/5 in all 4 ext. Normal tone. No atrophy. Sensation: Normal to light touch    Reflexes: DTRs 1+ in UE and absent in LE. Coordination/Cerebellar: Intact to finger-nose-finger     Gait: deferred    Skin: No significant bruising or lacerations.     LAB DATA REVIEWED:    Recent Results (from the past 24 hour(s))   GLUCOSE, POC    Collection Time: 09/19/17 11:34 AM   Result Value Ref Range    Glucose (POC) 155 (H) 65 - 100 mg/dL    Performed by Neil Boss    GLUCOSE, POC    Collection Time: 09/19/17  4:08 PM   Result Value Ref Range    Glucose (POC) 204 (H) 65 - 100 mg/dL    Performed by Scott, POC    Collection Time: 09/19/17  8:44 PM   Result Value Ref Range    Glucose (POC) 161 (H) 65 - 100 mg/dL    Performed by Kiesha Evans    CBC WITH AUTOMATED DIFF    Collection Time: 09/20/17  4:30 AM   Result Value Ref Range    WBC 6.5 4.1 - 11.1 K/uL    RBC 4.15 4.10 - 5.70 M/uL    HGB 12.4 12.1 - 17.0 g/dL    HCT 36.8 36.6 - 50.3 %    MCV 88.7 80.0 - 99.0 FL    MCH 29.9 26.0 - 34.0 PG    MCHC 33.7 30.0 - 36.5 g/dL    RDW 13.0 11.5 - 14.5 %    PLATELET 283 264 - 354 K/uL    NEUTROPHILS 56 32 - 75 %    LYMPHOCYTES 31 12 - 49 %    MONOCYTES 11 5 - 13 %    EOSINOPHILS 1 0 - 7 %    BASOPHILS 1 0 - 1 %    ABS. NEUTROPHILS 3.7 1.8 - 8.0 K/UL    ABS. LYMPHOCYTES 2.0 0.8 - 3.5 K/UL    ABS. MONOCYTES 0.7 0.0 - 1.0 K/UL    ABS. EOSINOPHILS 0.1 0.0 - 0.4 K/UL    ABS. BASOPHILS 0.0 0.0 - 0.1 K/UL   AMMONIA    Collection Time: 09/20/17  4:30 AM   Result Value Ref Range    Ammonia 24 <32 UMOL/L   GLUCOSE, POC    Collection Time: 09/20/17  7:19 AM   Result Value Ref Range    Glucose (POC) 169 (H) 65 - 100 mg/dL    Performed by John Gonzalez         Imaging review:  CT Head  1. 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 2. There are moderate changes small vessel disease periventricular white matter. There are bilateral occipital infarctions right larger than left. HOME MEDS  Prior to Admission Medications   Prescriptions Last Dose Informant Patient Reported? Taking?   acetaminophen (TYLENOL) 325 mg tablet Not Taking at Unknown time  No No   Sig: Take 2 Tabs by mouth every four (4) hours as needed. aspirin 81 mg chewable tablet 9/19/2017 at Unknown time  Yes Yes   Sig: Take 81 mg by mouth daily.    diphenhydrAMINE (BENADRYL) 25 mg capsule Not Taking at Unknown time  Yes No   Sig: Take 25 mg by mouth every six (6) hours as needed for Itching. diphenoxylate-atropine (LOMOTIL) 2.5-0.025 mg per tablet Not Taking at Unknown time  Yes No   Sig: Take 1 Tab by mouth four (4) times daily as needed for Diarrhea. docusate sodium (COLACE) 100 mg capsule Not Taking at Unknown time  Yes No   Sig: Take 100 mg by mouth nightly. glimepiride (AMARYL) 2 mg tablet 2017 at Unknown time  Yes Yes   Sig: Take 2 mg by mouth every morning. lisinopril (PRINIVIL, ZESTRIL) 20 mg tablet 2017 at Unknown time  Yes Yes   Sig: Take 20 mg by mouth daily. meloxicam (MOBIC) 7.5 mg tablet Not Taking at Unknown time  Yes No   Sig: Take  by mouth daily. metoprolol (LOPRESSOR) 50 mg tablet   No Yes   Si Tab by Per J Tube route two (2) times a day. Patient taking differently: Take 50 mg by mouth two (2) times a day. ondansetron hcl (ZOFRAN, AS HYDROCHLORIDE,) 4 mg tablet   Yes Yes   Sig: Take 4 mg by mouth every eight (8) hours as needed for Nausea. oxyCODONE-acetaminophen (PERCOCET) 5-325 mg per tablet   No No   Sig: Take 1 Tab by mouth every four (4) hours as needed. Max Daily Amount: 6 Tabs. pantoprazole (PROTONIX) 40 mg tablet   No Yes   Sig: Take 1 Tab by mouth daily. simvastatin (ZOCOR) 40 mg tablet   Yes Yes   Sig: Take 40 mg by mouth nightly. therapeutic multivitamin (THERA) tablet   Yes Yes   Sig: Take 1 Tab by mouth daily. trimethoprim-sulfamethoxazole (BACTRIM DS) 160-800 mg per tablet   Yes Yes   Sig: Take 1 Tab by mouth two (2) times a day.       Facility-Administered Medications: None       CURRENT MEDS  Current Facility-Administered Medications   Medication Dose Route Frequency    aspirin chewable tablet 81 mg  81 mg Oral DAILY    lisinopril (PRINIVIL, ZESTRIL) tablet 20 mg  20 mg Oral DAILY    atorvastatin (LIPITOR) tablet 40 mg  40 mg Oral QHS    therapeutic multivitamin (THERAGRAN) tablet 1 Tab  1 Tab Oral DAILY    sodium chloride (NS) flush 5-10 mL  5-10 mL IntraVENous Q8H    enoxaparin (LOVENOX) injection 40 mg  40 mg SubCUTAneous DAILY    insulin lispro (HUMALOG) injection   SubCUTAneous ACB&D       IMPRESSION:  Binu Frankel is a 76 y.o. male who presents with altered mental status. Pt had a fever but with no clear etiology. Pt is AAO X 3 now and states that he was just lethargic. Workup for altered mental status has been normal. No infectious or metabolic etiology found. Will get an EEG. CT head did show a pituitary mass and will proceed with MRI of the brain with and without contrast.    RECOMMENDATIONS:  - EEG  - MRI brain  - Blood work  - Possible neurosurgical evaluation after MRI of the brain. Thank you very much for this consultation. We will follow up on the above studies and give further recommendations as indicated.       Yancy Allen MD  Neurologist

## 2017-09-20 NOTE — ROUTINE PROCESS

## 2017-09-20 NOTE — PROGRESS NOTES
Oncology Nursing Communication Tool      7:31 AM  9/20/2017     Bedside shift change report given to Loreda Lundborg, RN (incoming nurse) by Sofia Bailey RN (outgoing nurse) on Sheri Alvarez a 76 y.o. male who was admitted on 9/18/2017  8:24 PM. Report included the following information SBAR, Kardex, MAR, Accordion and Recent Results. Significant changes during shift: None      Issues for physician to address: Results of Ct            Code Status: Full Code     Infections: VRE     Allergies: Review of patient's allergies indicates no known allergies. Current diet: DIET CARDIAC Regular       Pain Controlled [x] yes [] no   Bowel Movement [x] yes [] no   Last Bowel Movement (date) 9/19/17            Vital Signs:   Patient Vitals for the past 12 hrs:   Temp Pulse Resp BP SpO2   09/20/17 0714 98.3 °F (36.8 °C) 89 16 138/68 95 %   09/19/17 2251 98.9 °F (37.2 °C) 67 16 158/83 100 %   09/19/17 1937 99.6 °F (37.6 °C) 64 16 116/58 95 %      Intake & Output:     Intake/Output Summary (Last 24 hours) at 09/20/17 0731  Last data filed at 09/20/17 0113   Gross per 24 hour   Intake              120 ml   Output             1700 ml   Net            -1580 ml      Laboratory Results:     Recent Results (from the past 12 hour(s))   GLUCOSE, POC    Collection Time: 09/19/17  8:44 PM   Result Value Ref Range    Glucose (POC) 161 (H) 65 - 100 mg/dL    Performed by Amaris Meadows    CBC WITH AUTOMATED DIFF    Collection Time: 09/20/17  4:30 AM   Result Value Ref Range    WBC 6.5 4.1 - 11.1 K/uL    RBC 4.15 4.10 - 5.70 M/uL    HGB 12.4 12.1 - 17.0 g/dL    HCT 36.8 36.6 - 50.3 %    MCV 88.7 80.0 - 99.0 FL    MCH 29.9 26.0 - 34.0 PG    MCHC 33.7 30.0 - 36.5 g/dL    RDW 13.0 11.5 - 14.5 %    PLATELET 617 184 - 211 K/uL    NEUTROPHILS 56 32 - 75 %    LYMPHOCYTES 31 12 - 49 %    MONOCYTES 11 5 - 13 %    EOSINOPHILS 1 0 - 7 %    BASOPHILS 1 0 - 1 %    ABS. NEUTROPHILS 3.7 1.8 - 8.0 K/UL    ABS.  LYMPHOCYTES 2.0 0.8 - 3.5 K/UL    ABS. MONOCYTES 0.7 0.0 - 1.0 K/UL    ABS. EOSINOPHILS 0.1 0.0 - 0.4 K/UL    ABS. BASOPHILS 0.0 0.0 - 0.1 K/UL   AMMONIA    Collection Time: 09/20/17  4:30 AM   Result Value Ref Range    Ammonia 24 <32 UMOL/L   GLUCOSE, POC    Collection Time: 09/20/17  7:19 AM   Result Value Ref Range    Glucose (POC) 169 (H) 65 - 100 mg/dL    Performed by Shree Lott               Opportunity for questions and clarifications were given to the incoming nurse. Patient's bed is in low position, side rails x2, door open PRN, call bell within reach and patient not in distress.       Joseph Gaviria RN

## 2017-09-20 NOTE — PROGRESS NOTES
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 No meningeal signs, no headache, no photophobia, AOx3. Flu negative as well. Blood cx NTD.  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 DMII  -no evidence of hypoglycemic episodes  -Monitor ac qhs  -hold oral meds and change SSI to ACHS    HTN  -Continue lisinopril    Hx of Duodenal Ca s/p surgical resection     Code Status: Full  Surrogate Decision Maker: Son     DVT Prophylaxis: Lovenox  GI Prophylaxis: not indicated     Baseline: Independent      Body mass index is 25.24 kg/(m^2). Recommended Disposition: Home w/Family     Subjective: Reports he is back to normal. No fever. No weakness. No confusion. Chief Complaint / Reason for Physician Visit  AMS    Review of Systems:  Symptom Y/N Comments  Symptom Y/N Comments   Fever/Chills n   Chest Pain n    Poor Appetite    Edema     Cough n   Abdominal Pain     Sputum    Joint Pain n    SOB/EVANS n   Pruritis/Rash     Nausea/vomit    Tolerating PT/OT     Diarrhea    Tolerating Diet y    Constipation    Other       Could NOT obtain due to:      Objective:     VITALS:   Last 24hrs VS reviewed since prior progress note.  Most recent are:  Patient Vitals for the past 24 hrs:   Temp Pulse Resp BP SpO2   17 0714 98.3 °F (36.8 °C) 89 16 138/68 95 %   17 2251 98.9 °F (37.2 °C) 67 16 158/83 100 %   17 1937 99.6 °F (37.6 °C) 64 16 116/58 95 %   17 1508 98.8 °F (37.1 °C) 73 16 102/61 95 % Intake/Output Summary (Last 24 hours) at 09/20/17 1142  Last data filed at 09/20/17 0619   Gross per 24 hour   Intake              120 ml   Output             1275 ml   Net            -1155 ml        PHYSICAL EXAM:  General: WD, WN. Alert, cooperative, no acute distress    EENT:  EOMI. Anicteric sclerae. MMM  Resp:  CTA bilaterally, no wheezing or rales. No accessory muscle use  CV:  Regular  rhythm,  No edema  GI:  Soft, Non distended, Non tender.  +Bowel sounds  Neurologic:  Alert and oriented X 3, normal speech,   Psych:   Good insight. Not anxious nor agitated  Skin:  No rashes.   No jaundice    Reviewed most current lab test results and cultures  YES  Reviewed most current radiology test results   YES  Review and summation of old records today    NO  Reviewed patient's current orders and MAR    YES  PMH/SH reviewed - no change compared to H&P      Current Facility-Administered Medications:     aspirin chewable tablet 81 mg, 81 mg, Oral, DAILY, Patti Al MD, 81 mg at 09/20/17 0819    diphenhydrAMINE (BENADRYL) capsule 25 mg, 25 mg, Oral, Q6H PRN, Patti Al MD, 25 mg at 09/19/17 2155    lisinopril (PRINIVIL, ZESTRIL) tablet 20 mg, 20 mg, Oral, DAILY, Patti Al MD, 20 mg at 09/20/17 0819    atorvastatin (LIPITOR) tablet 40 mg, 40 mg, Oral, QHS, Patti Al MD, 40 mg at 09/19/17 2149    therapeutic multivitamin (THERAGRAN) tablet 1 Tab, 1 Tab, Oral, DAILY, Patti Al MD, 1 Tab at 09/20/17 0819    sodium chloride (NS) flush 5-10 mL, 5-10 mL, IntraVENous, Q8H, Patti Al MD, 10 mL at 09/20/17 0430    sodium chloride (NS) flush 5-10 mL, 5-10 mL, IntraVENous, PRN, Patti Al MD    acetaminophen (TYLENOL) tablet 650 mg, 650 mg, Oral, Q4H PRN, Patti Al MD, 650 mg at 09/19/17 0331    enoxaparin (LOVENOX) injection 40 mg, 40 mg, SubCUTAneous, DAILY, Patti Al MD, 40 mg at 09/20/17 0819    insulin lispro (HUMALOG) injection, , SubCUTAneous, ACB&D, Patti Al MD, 2 Units at 09/20/17 0820    glucose chewable tablet 16 g, 4 Tab, Oral, PRDaniela GARDNER MD    dextrose (D50W) injection syrg 12.5-25 g, 12.5-25 g, IntraVENous, PRDaniela GARDNER MD    glucagon [facility] [place] [facility]") injection 1 mg, 1 mg, IntraMUSCular, Daniela WHITING MD    ________________________________________________________________________  Care Plan discussed with:    Comments   Patient y    Family      RN y    Care Manager     Consultant                        Multidiciplinary team rounds were held today with , nursing, pharmacist and clinical coordinator. Patient's plan of care was discussed; medications were reviewed and discharge planning was addressed. ________________________________________________________________________  Total NON critical care TIME:  25  Minutes    Total CRITICAL CARE TIME Spent:   Minutes non procedure based      Comments   >50% of visit spent in counseling and coordination of care     ________________________________________________________________________  Bam Valente MD     Procedures: see electronic medical records for all procedures/Xrays and details which were not copied into this note but were reviewed prior to creation of Plan. LABS:  I reviewed today's most current labs and imaging studies.   Pertinent labs include:  Recent Labs      09/20/17   0430  09/19/17   0334  09/18/17 2116   WBC  6.5  7.0  8.7   HGB  12.4  12.2  14.0   HCT  36.8  35.8*  39.2   PLT  252  274  277     Recent Labs      09/18/17 2116   NA  133*   K  3.6   CL  100   CO2  23   GLU  128*   BUN  17   CREA  0.98   CA  8.3*   ALB  3.5   TBILI  0.5   SGOT  53*   ALT  75       Signed: Bam Valente MD

## 2017-09-20 NOTE — ROUTINE PROCESS
Tried to do MRI but patient said he was having a hard time breathing.   Called Nurse for medication and Nurse talk to Doctor and said to wait and try again on Thursday

## 2017-09-20 NOTE — PROGRESS NOTES
Oncology Nursing Communication Tool      7:37 PM  9/20/2017     Bedside and Verbal shift change report given to Duran Mark RN (incoming nurse) by Vitaly Lopez (outgoing nurse) on Ariana Paredes a 76 y.o. male who was admitted on 9/18/2017  8:24 PM. Report included the following information SBAR, Kardex, Intake/Output, MAR, Accordion and Recent Results. Significant changes during shift: MRI not completed, patient had mini panic attack       Issues for physician to address: None             Code Status: Full Code     Infections: VRE     Allergies: Review of patient's allergies indicates no known allergies. Current diet: DIET CARDIAC Regular       Pain Controlled [x] yes [] no   Bowel Movement [] yes [x] no   Last Bowel Movement (date)     09/19/17            Vital Signs:   Patient Vitals for the past 12 hrs:   Temp Pulse Resp BP SpO2   09/20/17 1852 98 °F (36.7 °C) 70 16 173/87 98 %   09/20/17 1412 98.2 °F (36.8 °C) 72 16 143/69 94 %      Intake & Output:     Intake/Output Summary (Last 24 hours) at 09/20/17 1937  Last data filed at 09/20/17 1704   Gross per 24 hour   Intake              600 ml   Output             2050 ml   Net            -1450 ml      Laboratory Results:     Recent Results (from the past 12 hour(s))   GLUCOSE, POC    Collection Time: 09/20/17 11:24 AM   Result Value Ref Range    Glucose (POC) 309 (H) 65 - 100 mg/dL    Performed by Deangelo Pires    TSH 3RD GENERATION    Collection Time: 09/20/17  2:37 PM   Result Value Ref Range    TSH 1.04 0.36 - 3.74 uIU/mL   GLUCOSE, POC    Collection Time: 09/20/17  4:09 PM   Result Value Ref Range    Glucose (POC) 170 (H) 65 - 100 mg/dL    Performed by Trinidad Valadez)               Opportunity for questions and clarifications were given to the incoming nurse. Patient's bed is in low position, side rails x2, door open PRN, call bell within reach and patient not in distress.       Vitaly Lopez

## 2017-09-20 NOTE — PROGRESS NOTES
Problem: Falls - Risk of  Goal: *Absence of Falls  Document Kasi Fall Risk and appropriate interventions in the flowsheet.    Outcome: Progressing Towards Goal  Fall Risk Interventions:  Mobility Interventions: Bed/chair exit alarm     Mentation Interventions: Bed/chair exit alarm, Door open when patient unattended, Room close to nurse's station, Reorient patient     Medication Interventions: Bed/chair exit alarm, Patient to call before getting OOB     Elimination Interventions: Bed/chair exit alarm, Call light in reach, Patient to call for help with toileting needs, Toileting schedule/hourly rounds, Urinal in reach

## 2017-09-21 ENCOUNTER — APPOINTMENT (OUTPATIENT)
Dept: MRI IMAGING | Age: 77
DRG: 643 | End: 2017-09-21
Attending: PSYCHIATRY & NEUROLOGY
Payer: MEDICARE

## 2017-09-21 LAB
ALBUMIN SERPL-MCNC: 3.2 G/DL (ref 3.5–5)
ALBUMIN/GLOB SERPL: 0.8 {RATIO} (ref 1.1–2.2)
ALP SERPL-CCNC: 132 U/L (ref 45–117)
ALT SERPL-CCNC: 41 U/L (ref 12–78)
ANION GAP SERPL CALC-SCNC: 12 MMOL/L (ref 5–15)
AST SERPL-CCNC: 23 U/L (ref 15–37)
BILIRUB SERPL-MCNC: 0.6 MG/DL (ref 0.2–1)
BUN SERPL-MCNC: 12 MG/DL (ref 6–20)
BUN/CREAT SERPL: 14 (ref 12–20)
CALCIUM SERPL-MCNC: 8.6 MG/DL (ref 8.5–10.1)
CHLORIDE SERPL-SCNC: 103 MMOL/L (ref 97–108)
CO2 SERPL-SCNC: 20 MMOL/L (ref 21–32)
CREAT SERPL-MCNC: 0.85 MG/DL (ref 0.7–1.3)
ERYTHROCYTE [DISTWIDTH] IN BLOOD BY AUTOMATED COUNT: 12.8 % (ref 11.5–14.5)
GLOBULIN SER CALC-MCNC: 4.2 G/DL (ref 2–4)
GLUCOSE BLD STRIP.AUTO-MCNC: 152 MG/DL (ref 65–100)
GLUCOSE BLD STRIP.AUTO-MCNC: 177 MG/DL (ref 65–100)
GLUCOSE BLD STRIP.AUTO-MCNC: 218 MG/DL (ref 65–100)
GLUCOSE BLD STRIP.AUTO-MCNC: 239 MG/DL (ref 65–100)
GLUCOSE SERPL-MCNC: 139 MG/DL (ref 65–100)
HCT VFR BLD AUTO: 38.8 % (ref 36.6–50.3)
HGB BLD-MCNC: 13.3 G/DL (ref 12.1–17)
MCH RBC QN AUTO: 30.2 PG (ref 26–34)
MCHC RBC AUTO-ENTMCNC: 34.3 G/DL (ref 30–36.5)
MCV RBC AUTO: 88 FL (ref 80–99)
PLATELET # BLD AUTO: 268 K/UL (ref 150–400)
POTASSIUM SERPL-SCNC: 3.7 MMOL/L (ref 3.5–5.1)
PROT SERPL-MCNC: 7.4 G/DL (ref 6.4–8.2)
RBC # BLD AUTO: 4.41 M/UL (ref 4.1–5.7)
SERVICE CMNT-IMP: ABNORMAL
SODIUM SERPL-SCNC: 135 MMOL/L (ref 136–145)
WBC # BLD AUTO: 4.8 K/UL (ref 4.1–11.1)

## 2017-09-21 PROCEDURE — 97161 PT EVAL LOW COMPLEX 20 MIN: CPT

## 2017-09-21 PROCEDURE — 74011250637 HC RX REV CODE- 250/637: Performed by: GENERAL ACUTE CARE HOSPITAL

## 2017-09-21 PROCEDURE — A9577 INJ MULTIHANCE: HCPCS | Performed by: INTERNAL MEDICINE

## 2017-09-21 PROCEDURE — 74011250636 HC RX REV CODE- 250/636: Performed by: GENERAL ACUTE CARE HOSPITAL

## 2017-09-21 PROCEDURE — 65270000015 HC RM PRIVATE ONCOLOGY

## 2017-09-21 PROCEDURE — 80053 COMPREHEN METABOLIC PANEL: CPT | Performed by: INTERNAL MEDICINE

## 2017-09-21 PROCEDURE — 82962 GLUCOSE BLOOD TEST: CPT

## 2017-09-21 PROCEDURE — 85027 COMPLETE CBC AUTOMATED: CPT | Performed by: INTERNAL MEDICINE

## 2017-09-21 PROCEDURE — 74011636637 HC RX REV CODE- 636/637: Performed by: INTERNAL MEDICINE

## 2017-09-21 PROCEDURE — 74011250636 HC RX REV CODE- 250/636: Performed by: INTERNAL MEDICINE

## 2017-09-21 PROCEDURE — 74011250637 HC RX REV CODE- 250/637: Performed by: INTERNAL MEDICINE

## 2017-09-21 PROCEDURE — G8979 MOBILITY GOAL STATUS: HCPCS

## 2017-09-21 PROCEDURE — 70553 MRI BRAIN STEM W/O & W/DYE: CPT

## 2017-09-21 PROCEDURE — G8978 MOBILITY CURRENT STATUS: HCPCS

## 2017-09-21 PROCEDURE — 36415 COLL VENOUS BLD VENIPUNCTURE: CPT | Performed by: INTERNAL MEDICINE

## 2017-09-21 RX ORDER — METOPROLOL TARTRATE 50 MG/1
50 TABLET ORAL 2 TIMES DAILY
COMMUNITY

## 2017-09-21 RX ORDER — THERA TABS 400 MCG
1 TAB ORAL
COMMUNITY
End: 2017-09-24

## 2017-09-21 RX ORDER — LORAZEPAM 2 MG/ML
1 INJECTION INTRAMUSCULAR
Status: COMPLETED | OUTPATIENT
Start: 2017-09-21 | End: 2017-09-21

## 2017-09-21 RX ORDER — ASPIRIN 81 MG/1
81 TABLET ORAL DAILY
COMMUNITY
End: 2017-09-24

## 2017-09-21 RX ORDER — HYDRALAZINE HYDROCHLORIDE 20 MG/ML
10 INJECTION INTRAMUSCULAR; INTRAVENOUS
Status: DISCONTINUED | OUTPATIENT
Start: 2017-09-21 | End: 2017-09-24 | Stop reason: HOSPADM

## 2017-09-21 RX ORDER — LISINOPRIL 20 MG/1
40 TABLET ORAL DAILY
Status: DISCONTINUED | OUTPATIENT
Start: 2017-09-21 | End: 2017-09-24 | Stop reason: HOSPADM

## 2017-09-21 RX ADMIN — Medication 10 ML: at 13:46

## 2017-09-21 RX ADMIN — INSULIN LISPRO 2 UNITS: 100 INJECTION, SOLUTION INTRAVENOUS; SUBCUTANEOUS at 08:57

## 2017-09-21 RX ADMIN — INSULIN LISPRO 2 UNITS: 100 INJECTION, SOLUTION INTRAVENOUS; SUBCUTANEOUS at 17:29

## 2017-09-21 RX ADMIN — THERA TABS 1 TABLET: TAB at 08:56

## 2017-09-21 RX ADMIN — Medication 10 ML: at 22:00

## 2017-09-21 RX ADMIN — LORAZEPAM 1 MG: 2 INJECTION INTRAMUSCULAR; INTRAVENOUS at 13:46

## 2017-09-21 RX ADMIN — INSULIN LISPRO 3 UNITS: 100 INJECTION, SOLUTION INTRAVENOUS; SUBCUTANEOUS at 11:59

## 2017-09-21 RX ADMIN — ENOXAPARIN SODIUM 40 MG: 40 INJECTION SUBCUTANEOUS at 08:56

## 2017-09-21 RX ADMIN — GADOBENATE DIMEGLUMINE 15 ML: 529 INJECTION, SOLUTION INTRAVENOUS at 14:46

## 2017-09-21 RX ADMIN — Medication 10 ML: at 05:15

## 2017-09-21 RX ADMIN — ASPIRIN 81 MG CHEWABLE TABLET 81 MG: 81 TABLET CHEWABLE at 08:56

## 2017-09-21 RX ADMIN — ATORVASTATIN CALCIUM 40 MG: 40 TABLET, FILM COATED ORAL at 21:59

## 2017-09-21 RX ADMIN — HYDRALAZINE HYDROCHLORIDE 10 MG: 20 INJECTION INTRAMUSCULAR; INTRAVENOUS at 23:31

## 2017-09-21 RX ADMIN — INSULIN LISPRO 2 UNITS: 100 INJECTION, SOLUTION INTRAVENOUS; SUBCUTANEOUS at 21:59

## 2017-09-21 RX ADMIN — LISINOPRIL 40 MG: 20 TABLET ORAL at 08:54

## 2017-09-21 RX ADMIN — ACETAMINOPHEN 650 MG: 325 TABLET ORAL at 05:11

## 2017-09-21 NOTE — PROGRESS NOTES
Problem: Mobility Impaired (Adult and Pediatric)  Goal: *Acute Goals and Plan of Care (Insert Text)  Physical Therapy Goals  Initiated 9/21/2017  1. Patient will move from supine to sit and sit to supine , scoot up and down and roll side to side in bed with independence within 7 day(s). 2. Patient will transfer from bed to chair and chair to bed with independence using the least restrictive device within 7 day(s). 3. Patient will perform sit to stand with independence within 7 day(s). 4. Patient will ambulate with independence for 200 feet with the least restrictive device within 7 day(s). 5. Patient will ascend/descend 10 stairs with 1 handrail(s) with supervision/set-up within 7 day(s). PHYSICAL THERAPY EVALUATION  Patient: Christina Belle (49 y.o. male)  Date: 9/21/2017  Primary Diagnosis: Fever  Fever        Precautions:  Fall      ASSESSMENT :  Based on the objective data described below, the patient presents with impaired functional mobility secondary to impaired standing balance, generalized weakness, limited endurance, mild gait impairments, and decreased activity tolerance. Pt received sitting on EOB with nursing present and agreeable to PT evaluation with eduction and encouragement. Pt cleared by nursing for mobility. Pt with mild confusion and slightly agitated as he wanted to eat his breakfast, however agreeable to short distance ambulation in room. He performed transfers and gait with SBA, however limited to only 15' in room secondary to increased LE weakness. He demonstrates mildly unsteady gait with shortened step length, widened JENNIFER, and decreased step clearance throughout gait. He reports that he has most difficulty with directional changes due to scissoring of LEs. Educated pt on taking smaller steps to avoid scissoring. He declined further evaluation/intervention secondary to wanting to eat and also having increased LE weakness.  Pt was assisted back onto EOB and left with all needs met, RN aware, and bed alarm on. Anticipate pt will progress well in acute PT and return home with family support and HHPT pending progress in acute PT. Recommend pt sit up on EOB/in bedside chair 3x/day and ambulate in hallway with nursing supervision at least 1x/day while in the hospital.     Patient will benefit from skilled intervention to address the above impairments. Patients rehabilitation potential is considered to be Good  Factors which may influence rehabilitation potential include:   [ ]         None noted  [X]         Mental ability/status  [ ]         Medical condition  [ ]         Home/family situation and support systems  [ ]         Safety awareness  [ ]         Pain tolerance/management  [ ]         Other:        PLAN :  Recommendations and Planned Interventions:  [X]           Bed Mobility Training             [ ]    Neuromuscular Re-Education  [X]           Transfer Training                   [ ]    Orthotic/Prosthetic Training  [X]           Gait Training                         [ ]    Modalities  [X]           Therapeutic Exercises           [ ]    Edema Management/Control  [X]           Therapeutic Activities            [X]    Patient and Family Training/Education  [ ]           Other (comment):     Frequency/Duration: Patient will be followed by physical therapy  4 times a week to address goals. Discharge Recommendations: Home Health and To Be Determined  Further Equipment Recommendations for Discharge: TBD based on progress in acute PT       SUBJECTIVE:   Patient stated Sunni Mayer lives in the [facility] from [facility].       OBJECTIVE DATA SUMMARY:   HISTORY:    Past Medical History:   Diagnosis Date    Aneurysm ([facility] [address]       aortic    Arrhythmia       A-fib    Calculus of kidney      Cancer ([facility] [address]      Chronic kidney disease       stone    Diabetes ([facility] [address]      Fast heart beat      Former tobacco use      Hypertension      Muscle weakness      Prostate disorder      Stomach pain       Past Surgical History:   Procedure Laterality Date    ABDOMEN SURGERY PROC UNLISTED         Whipple Procedure    HX ORTHOPAEDIC         neck surgery    HX OTHER SURGICAL   1/6/2015     Whipple procedure, G tube, J tube for duodenal cancer    UPPER GI ENDOSCOPY,BALL DIL,30MM   12/2/2014          UPPER GI ENDOSCOPY,BIOPSY   11/26/2014          UPPER GI ENDOSCOPY,BIOPSY   12/2/2014          UPPER GI ENDOSCOPY,DIAGNOSIS   1/23/2015           Prior Level of Function/Home Situation: Pt is mod I to independent for mobility at baseline. Reports using SPC in the community and occasionally at home as well. Drives. Denies fall history. Son lives near him, however he reports that he does not see his son often. Lives alone. Personal factors and/or comorbidities impacting plan of care:      Home Situation  Home Environment: Private residence  # Steps to Enter: 0  One/Two Story Residence: Two story, live on 1st floor  Living Alone: Yes  Support Systems: Family member(s)  Patient Expects to be Discharged to[de-identified] Private residence  Current DME Used/Available at Home: Allena Sauers, straight, Walker, rollator, Shower chair, Grab bars     EXAMINATION/PRESENTATION/DECISION MAKING:   Critical Behavior:  Neurologic State: Alert  Orientation Level: Oriented to person, Oriented to place, Oriented to time, Disoriented to situation  Cognition: Appropriate decision making, Appropriate for age attention/concentration     Hearing:   Auditory  Auditory Impairment: Hard of hearing, bilateral  Skin:  Intact  Edema: None  Range Of Motion:  AROM: Generally decreased, functional                       Strength:    Strength: Generally decreased, functional                    Tone & Sensation:   Tone: Normal                              Coordination:  Coordination: Within functional limits  Vision:      Functional Mobility:  Bed Mobility:           Scooting: Independent  Transfers:  Sit to Stand: Stand-by asssistance  Stand to Sit: Stand-by asssistance Balance:   Sitting: Intact  Standing: Impaired  Standing - Static: Good  Standing - Dynamic : Good  Ambulation/Gait Training:  Distance (ft): 15 Feet (ft)  Assistive Device: Gait belt  Ambulation - Level of Assistance: Stand-by asssistance     Gait Description (WDL): Exceptions to WDL  Gait Abnormalities: Altered arm swing;Decreased step clearance        Base of Support: Widened     Speed/Otilia: Pace decreased (<100 feet/min)  Step Length: Left shortened;Right shortened        Functional Measure:  Tinetti test:      Sitting Balance: 1  Arises: 1  Attempts to Rise: 2  Immediate Standing Balance: 2  Standing Balance: 2  Nudged: 2  Eyes Closed: 1  Turn 360 Degrees - Continuous/Discontinuous: 0  Turn 360 Degrees - Steady/Unsteady: 1  Sitting Down: 1  Balance Score: 13  Indication of Gait: 1  R Step Length/Height: 1  L Step Length/Height: 1  R Foot Clearance: 1  L Foot Clearance: 1  Step Symmetry: 1  Step Continuity: 0  Path: 1  Trunk: 2  Walking Time: 0  Gait Score: 9  Total Score: 22         Tinetti Test and G-code impairment scale:  Percentage of Impairment CH     0%    CI     1-19% CJ     20-39% CK     40-59% CL     60-79% CM     80-99% CN      100%   Tinetti  Score 0-28 28 23-27 17-22 12-16 6-11 1-5 0          Tinetti Tool Score Risk of Falls  <19 = High Fall Risk  19-24 = Moderate Fall Risk  25-28 = Low Fall Risk  Tinetti ME. Performance-Oriented Assessment of Mobility Problems in Elderly Patients. Patel 66; [id number]. (Scoring Description: PT Bulletin Feb. 10, 1993)     Older adults: Carolyn Blue et al, 2009; n = 1000 [facility] elderly evaluated with ABC, MANDO, ADL, and IADL)  · Mean MANDO score for males aged 69-68 years = 26.21(3.40)  · Mean MANDO score for females age 69-68 years = 25.16(4.30)  · Mean MANDO score for males over 80 years = 23.29(6.02)  · Mean MANDO score for females over 80 years = 17.20(8.32)         G codes:   In compliance with CMSs Claims Based Outcome Reporting, the following G-code set was chosen for this patient based on their primary functional limitation being treated: The outcome measure chosen to determine the severity of the functional limitation was the Tinetti with a score of 22/28 which was correlated with the impairment scale. · Mobility - Walking and Moving Around:               - CURRENT STATUS:    CJ - 20%-39% impaired, limited or restricted               - GOAL STATUS:           CI - 1%-19% impaired, limited or restricted               - D/C STATUS:                       ---------------To be determined---------------      Physical Therapy Evaluation Charge Determination   History Examination Presentation Decision-Making   MEDIUM  Complexity : 1-2 comorbidities / personal factors will impact the outcome/ POC  HIGH Complexity : 4+ Standardized tests and measures addressing body structure, function, activity limitation and / or participation in recreation  LOW Complexity : Stable, uncomplicated  Other outcome measures Barthel Index  LOW       Based on the above components, the patient evaluation is determined to be of the following complexity level: LOW      Pain:  Pain Scale 1: Numeric (0 - 10)  Pain Intensity 1: 4  Pain Location 1: Back     Pain Description 1: Aching  Pain Intervention(s) 1: Medication (see MAR)  Activity Tolerance:   Fair - limited endurance  Please refer to the flowsheet for vital signs taken during this treatment. After treatment:   [ ]         Patient left in no apparent distress sitting up in chair  [X]         Patient left in no apparent distress in bed - sitting on EOB  [X]         Call bell left within reach  [X]         Nursing notified  [ ]         Caregiver present  [X]         Bed alarm activated      COMMUNICATION/EDUCATION:   The patients plan of care was discussed with: Physical Therapist and Registered Nurse.  [X]         Fall prevention education was provided and the patient/caregiver indicated understanding.   [X] Patient/family have participated as able in goal setting and plan of care. [X]         Patient/family agree to work toward stated goals and plan of care. [ ]         Patient understands intent and goals of therapy, but is neutral about his/her participation. [ ]         Patient is unable to participate in goal setting and plan of care.      Thank you for this referral.  Neil Barbour, PT, DPT   Time Calculation: 15 mins

## 2017-09-21 NOTE — PROCEDURES
Patient Name: Christina Belle  : 1940  Age: 76 y.o. Ordering physician: No ref. provider found  Date of EE17   EEG procedure number: [id number]  Diagnosis: Altered mental status  Interpreting physician: Jairon Altamirano MD      ELECTROENCEPHALOGRAM REPORT     PROCEDURE: EEG. CLINICAL INDICATION: The patient is a 76 y.o. male who is being evaluated for baseline electro cerebral activities and to rule out seizure focus. EEG CLASSIFICATION: Essentially normal awake and asleep. DESCRIPTION OF THE RECORD:   The background of this recording contains a posteriorly-located occipital alpha rhythm of 8 Hz that attenuates with eye opening. Throughout the recording, there were no clear areas of focal slowing nor spike or spike-and-wave discharges seen. Hyperventilation was not performed. Photic stimulation produced no response in the posterior head regions. During the recording the patient did not achieve stage II sleep    INTERPRETATION: This is a normal electroencephalogram with the patient awake, showing no clear focal abnormalities or epileptiform activity. A normal EEG doesn't not rule out seizures. Clinical correlation recommended.       Jairon Altamirano MD  Neurologist       l

## 2017-09-21 NOTE — CONSULTS
NEUROLOGY NOTE       Chief Complaint   Patient presents with    Abdominal Pain     mid abdominal pain x a few days.  Fatigue     Pt's son reports pt was up last night and has been feeling nauseous and weak since last night,. Pt's son reports that pt was putting on socks, but he was acting disoriented and putting on the dirty socks instead of clean socks. Pt denies any one sided weakness. Pt does not present with slurred speech and does not present with facial droop. Pt to triage via wheelchair. SUBJECTIVE:  Could not tolerate MRI yesterday . Will try again today  No new neuro c/o    HISTORY OF PRESENT ILLNESS  Merari Pollard is a 76 y.o. male who presents to the hospital because of altered mental status and pituitary mass. Hx obtained by chart review. Pt states that he was lethargic but did not state any specific history about being confused. Family not at bedside at this time. According to the ER notes \" Per son, pt complained of nausea and weakness since last night. Son notes pt presenting with AMS, stating pt put on dirty socks. Son denies pt having any one sided weakness, slurred speech or facial droop. \"    Pt did have a CT head and it showed a pituitary mass.      ROS  A ten system review of constitutional, cardiovascular, respiratory, musculoskeletal, endocrine, skin, SHEENT, genitourinary, psychiatric and neurologic systems was obtained and is unremarkable except as stated in HPI     PMH  Past Medical History:   Diagnosis Date    Aneurysm ([address]     aortic    Arrhythmia     A-fib    Calculus of kidney     Cancer ([address]     Chronic kidney disease     stone    Diabetes ([address]     Fast heart beat     Former tobacco use     Hypertension     Muscle weakness     Prostate disorder     Stomach pain        FH  Family History   Problem Relation Age of Onset    Cancer Mother     Heart Disease Mother     Stroke Father     Cancer Brother     Diabetes Brother     Heart Disease Brother SH  Social History     Social History    Marital status:      Spouse name: N/A    Number of children: N/A    Years of education: N/A     Social History Main Topics    Smoking status: Former Smoker     Packs/day: 0.00     Years: 60.00    Smokeless tobacco: Never Used      Comment: pipe and cigar use currently    Alcohol use No      Comment: quit -14    Drug use: No    Sexual activity: Not Asked     Other Topics Concern    None     Social History Narrative       ALLERGIES  No Known Allergies    PHYSICAL EXAM  EXAMINATION:   Patient Vitals for the past 24 hrs:   Temp Pulse Resp BP SpO2   09/21/17 0745 98.2 °F (36.8 °C) 82 18 152/82 95 %   09/21/17 0013 - 71 - 184/84 -   09/20/17 2225 99.4 °F (37.4 °C) 77 20 (!) 182/97 97 %   09/20/17 1852 98 °F (36.7 °C) 70 16 173/87 98 %   09/20/17 1412 98.2 °F (36.8 °C) 72 16 143/69 94 %        General:   General appearance: Pt is in no acute distress   Distal pulses are preserved    Neurological Examination:   Mental Status:  AAO x3. Speech is fluent. Follows commands, has normal fund of knowledge, attention, short term recall, comprehension and insight. Cranial Nerves: Visual fields are full. PERRL, Extraocular movements are full. Facial sensation intact V1- V3. Facial movement intact, symmetric. Hearing intact to conversation. Palate elevates symmetrically. Shoulder shrug symmetric. Tongue midline. Motor: Strength is 5/5 in all 4 ext. Normal tone. No atrophy. Sensation: Normal to light touch    Coordination/Cerebellar: Intact to finger-nose-finger     Gait: deferred    Skin: No significant bruising or lacerations.     LAB DATA REVIEWED:    Recent Results (from the past 24 hour(s))   TSH 3RD GENERATION    Collection Time: 09/20/17  2:37 PM   Result Value Ref Range    TSH 1.04 0.36 - 3.74 uIU/mL   T4, FREE    Collection Time: 09/20/17  2:37 PM   Result Value Ref Range    T4, Free 0.9 0.8 - 1.5 NG/DL   GLUCOSE, POC    Collection Time: 09/20/17 4:09 PM   Result Value Ref Range    Glucose (POC) 170 (H) 65 - 100 mg/dL    Performed by Marylu Marathon)    GLUCOSE, POC    Collection Time: 09/20/17  9:05 PM   Result Value Ref Range    Glucose (POC) 177 (H) 65 - 100 mg/dL    Performed by Marylu Marathon)    METABOLIC PANEL, COMPREHENSIVE    Collection Time: 09/21/17  4:27 AM   Result Value Ref Range    Sodium 135 (L) 136 - 145 mmol/L    Potassium 3.7 3.5 - 5.1 mmol/L    Chloride 103 97 - 108 mmol/L    CO2 20 (L) 21 - 32 mmol/L    Anion gap 12 5 - 15 mmol/L    Glucose 139 (H) 65 - 100 mg/dL    BUN 12 6 - 20 MG/DL    Creatinine 0.85 0.70 - 1.30 MG/DL    BUN/Creatinine ratio 14 12 - 20      GFR est AA >60 >60 ml/min/1.73m2    GFR est non-AA >60 >60 ml/min/1.73m2    Calcium 8.6 8.5 - 10.1 MG/DL    Bilirubin, total 0.6 0.2 - 1.0 MG/DL    ALT (SGPT) 41 12 - 78 U/L    AST (SGOT) 23 15 - 37 U/L    Alk. phosphatase 132 (H) 45 - 117 U/L    Protein, total 7.4 6.4 - 8.2 g/dL    Albumin 3.2 (L) 3.5 - 5.0 g/dL    Globulin 4.2 (H) 2.0 - 4.0 g/dL    A-G Ratio 0.8 (L) 1.1 - 2.2     CBC W/O DIFF    Collection Time: 09/21/17  4:27 AM   Result Value Ref Range    WBC 4.8 4.1 - 11.1 K/uL    RBC 4.41 4.10 - 5.70 M/uL    HGB 13.3 12.1 - 17.0 g/dL    HCT 38.8 36.6 - 50.3 %    MCV 88.0 80.0 - 99.0 FL    MCH 30.2 26.0 - 34.0 PG    MCHC 34.3 30.0 - 36.5 g/dL    RDW 12.8 11.5 - 14.5 %    PLATELET 458 777 - 765 K/uL   GLUCOSE, POC    Collection Time: 09/21/17  7:27 AM   Result Value Ref Range    Glucose (POC) 152 (H) 65 - 100 mg/dL    Performed by [employer], POC    Collection Time: 09/21/17 11:19 AM   Result Value Ref Range    Glucose (POC) 218 (H) 65 - 100 mg/dL    Performed by Courtney Anderson         Imaging review:  CT Head  1. 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 2. There are moderate changes small vessel disease periventricular white matter.   There are bilateral occipital infarctions right larger than left. Stroke labs:  HgBA1c    Lab Results   Component Value Date/Time    Hemoglobin A1c 5.8 2015 04:38 AM     LDL   No results found for: LDL, LDLC, DLDLP     HOME MEDS  Prior to Admission Medications   Prescriptions Last Dose Informant Patient Reported? Taking?   acetaminophen (TYLENOL) 325 mg tablet Not Taking at Unknown time  No No   Sig: Take 2 Tabs by mouth every four (4) hours as needed. aspirin 81 mg chewable tablet 2017 at Unknown time  Yes Yes   Sig: Take 81 mg by mouth daily. diphenhydrAMINE (BENADRYL) 25 mg capsule Not Taking at Unknown time  Yes No   Sig: Take 25 mg by mouth every six (6) hours as needed for Itching. diphenoxylate-atropine (LOMOTIL) 2.5-0.025 mg per tablet Not Taking at Unknown time  Yes No   Sig: Take 1 Tab by mouth four (4) times daily as needed for Diarrhea. docusate sodium (COLACE) 100 mg capsule Not Taking at Unknown time  Yes No   Sig: Take 100 mg by mouth nightly. glimepiride (AMARYL) 2 mg tablet 2017 at Unknown time  Yes Yes   Sig: Take 2 mg by mouth every morning. lisinopril (PRINIVIL, ZESTRIL) 20 mg tablet 2017 at Unknown time  Yes Yes   Sig: Take 20 mg by mouth daily. meloxicam (MOBIC) 7.5 mg tablet Not Taking at Unknown time  Yes No   Sig: Take  by mouth daily. metoprolol (LOPRESSOR) 50 mg tablet   No Yes   Si Tab by Per J Tube route two (2) times a day. Patient taking differently: Take 50 mg by mouth two (2) times a day. ondansetron hcl (ZOFRAN, AS HYDROCHLORIDE,) 4 mg tablet   Yes Yes   Sig: Take 4 mg by mouth every eight (8) hours as needed for Nausea. oxyCODONE-acetaminophen (PERCOCET) 5-325 mg per tablet   No No   Sig: Take 1 Tab by mouth every four (4) hours as needed. Max Daily Amount: 6 Tabs. pantoprazole (PROTONIX) 40 mg tablet   No Yes   Sig: Take 1 Tab by mouth daily. simvastatin (ZOCOR) 40 mg tablet   Yes Yes   Sig: Take 40 mg by mouth nightly.    therapeutic multivitamin (THERA) tablet   Yes Yes   Sig: Take 1 Tab by mouth daily. trimethoprim-sulfamethoxazole (BACTRIM DS) 160-800 mg per tablet   Yes Yes   Sig: Take 1 Tab by mouth two (2) times a day. Facility-Administered Medications: None       CURRENT MEDS  Current Facility-Administered Medications   Medication Dose Route Frequency    lisinopril (PRINIVIL, ZESTRIL) tablet 40 mg  40 mg Oral DAILY    insulin lispro (HUMALOG) injection   SubCUTAneous AC&HS    aspirin chewable tablet 81 mg  81 mg Oral DAILY    atorvastatin (LIPITOR) tablet 40 mg  40 mg Oral QHS    therapeutic multivitamin (THERAGRAN) tablet 1 Tab  1 Tab Oral DAILY    sodium chloride (NS) flush 5-10 mL  5-10 mL IntraVENous Q8H    enoxaparin (LOVENOX) injection 40 mg  40 mg SubCUTAneous DAILY       IMPRESSION:  Ariana Paredes is a 76 y.o. male who presents with altered mental status. Pt had a fever but with no clear etiology. Pt is AAO X 3 now. EEG is normal. CT head did show a pituitary mass and bilateral occipital infarct. Will proceed with MRI of the brain with and without contrast.    1. Altered mental status  2. Bilateral occipital infarct  3.  Pituitary mass    RECOMMENDATIONS:  - EEG - normal  - MRI brain - pending  - Blood work - initial w/u is normal.  - Possible neurosurgical evaluation after MRI of the brain  - Cont ASA 81 mg a day  - BP goal is less than 140/90  - Will get lipid panel with goal ldl less than 70        Junior Erickson MD  Neurologist

## 2017-09-21 NOTE — PROGRESS NOTES
Hospitalist Progress Note    NAME: Cali Pfeiffer   :  1940   MRN:  [id number]       Assessment / Plan:  Encephalopathy with one episode of fever resolved   Possible pituitary mass work up underway  B/L occipital lobe  Infarctions   -Pt reports that he only had brief episode of confusion but did not have any other symptoms  -Fever of 101.4F only one episode on arrival but now resolved  -work up so far negative including UA neg, WBC 8.7, CXR neg, CT Abdo/Pelvis w/ contrast negative for acute abnormality. No meningeal signs, no headache, no photophobia, AOx3. Flu negative as well. Blood cx NTD.  -CT brain ordered yesterday showed pituitary mass and also  occipital infarctions. -MRI today and will give Ativan before.  -started on asa. neurology following. Follow lipid panel. DMII  -no evidence of hypoglycemic episodes  -Monitor ac qhs  -c/w ssi    HTN  -increase lisinopril to 40 mg daily and add prn hydralazine    Hx of Duodenal Ca s/p surgical resection     Code Status: Full  Surrogate Decision Maker: Son     DVT Prophylaxis: Lovenox  GI Prophylaxis: not indicated     Baseline: Independent      Body mass index is 25.21 kg/(m^2). Recommended Disposition: Home w/Family     Subjective: Could not tolerate MRI yesterday as he had a panic attack. No more confusion. No events over nite. Blood pressure on the higher side. Chief Complaint / Reason for Physician Visit  AMS    Review of Systems:  Symptom Y/N Comments  Symptom Y/N Comments   Fever/Chills n   Chest Pain n    Poor Appetite    Edema     Cough n   Abdominal Pain     Sputum    Joint Pain n    SOB/EVANS n   Pruritis/Rash     Nausea/vomit    Tolerating PT/OT     Diarrhea    Tolerating Diet y    Constipation    Other       Could NOT obtain due to:      Objective:     VITALS:   Last 24hrs VS reviewed since prior progress note.  Most recent are:  Patient Vitals for the past 24 hrs:   Temp Pulse Resp BP SpO2   17 1400 97.3 °F (36.3 °C) 81 18 141/81 94 %   09/21/17 0745 98.2 °F (36.8 °C) 82 18 152/82 95 %   09/21/17 0013 - 71 - 184/84 -   09/20/17 2225 99.4 °F (37.4 °C) 77 20 (!) 182/97 97 %   09/20/17 1852 98 °F (36.7 °C) 70 16 173/87 98 %       Intake/Output Summary (Last 24 hours) at 09/21/17 1420  Last data filed at 09/21/17 0422   Gross per 24 hour   Intake                0 ml   Output             1500 ml   Net            -1500 ml        PHYSICAL EXAM:  General: WD, WN. Alert, cooperative, no acute distress    EENT:  EOMI. Anicteric sclerae. MMM  Resp:  CTA bilaterally, no wheezing or rales. No accessory muscle use  CV:  Regular  rhythm,  No edema  GI:  Soft, Non distended, Non tender.  +Bowel sounds  Neurologic:  Alert and oriented X 3, normal speech,   Psych:   Good insight. Not anxious nor agitated  Skin:  No rashes.   No jaundice    Reviewed most current lab test results and cultures  YES  Reviewed most current radiology test results   YES  Review and summation of old records today    NO  Reviewed patient's current orders and MAR    YES  PMH/SH reviewed - no change compared to H&P      Current Facility-Administered Medications:     hydrALAZINE (APRESOLINE) 20 mg/mL injection 10 mg, 10 mg, IntraVENous, Q6H PRN, Bridget Kapadia MD    lisinopril (PRINIVIL, ZESTRIL) tablet 40 mg, 40 mg, Oral, DAILY, Bridget Kapadia MD, 40 mg at 09/21/17 0854    insulin lispro (HUMALOG) injection, , SubCUTAneous, AC&HS, Bridget Kapadia MD, 3 Units at 09/21/17 1159    aspirin chewable tablet 81 mg, 81 mg, Oral, DAILY, Little Gill MD, 81 mg at 09/21/17 0856    diphenhydrAMINE (BENADRYL) capsule 25 mg, 25 mg, Oral, Q6H PRN, Little Gill MD, 25 mg at 09/20/17 2116    atorvastatin (LIPITOR) tablet 40 mg, 40 mg, Oral, QHS, Little Gill MD, 40 mg at 09/20/17 2116    therapeutic multivitamin (THERAGRAN) tablet 1 Tab, 1 Tab, Oral, DAILY, Little Gill MD, 1 Tab at 09/21/17 0856    sodium chloride (NS) flush 5-10 mL, 5-10 mL, IntraVENous, Q8H, Little Gill MD, 10 mL at 09/21/17 1346    sodium chloride (NS) flush 5-10 mL, 5-10 mL, IntraVENous, PRN, Fide Chavarria MD    acetaminophen (TYLENOL) tablet 650 mg, 650 mg, Oral, Q4H PRN, Fide Chavarria MD, 650 mg at 09/21/17 0511    enoxaparin (LOVENOX) injection 40 mg, 40 mg, SubCUTAneous, DAILY, Fide Chavarria MD, 40 mg at 09/21/17 0856    glucose chewable tablet 16 g, 4 Tab, Oral, PRN, Fide Chavarria MD    dextrose (D50W) injection syrg 12.5-25 g, 12.5-25 g, IntraVENous, PRN, Fide Chavarria MD    glucagon (GLUCAGEN) injection 1 mg, 1 mg, IntraMUSCular, PRN, Fide Chavarria MD    ________________________________________________________________________  Care Plan discussed with:    Comments   Patient y    Family      RN y    Care Manager     Consultant                        Multidiciplinary team rounds were held today with , nursing, pharmacist and clinical coordinator. Patient's plan of care was discussed; medications were reviewed and discharge planning was addressed. ________________________________________________________________________  Total NON critical care TIME:  25  Minutes    Total CRITICAL CARE TIME Spent:   Minutes non procedure based      Comments   >50% of visit spent in counseling and coordination of care     ________________________________________________________________________  Angelica Gupta MD     Procedures: see electronic medical records for all procedures/Xrays and details which were not copied into this note but were reviewed prior to creation of Plan. LABS:  I reviewed today's most current labs and imaging studies.   Pertinent labs include:  Recent Labs      09/21/17 0427 09/20/17   0430  09/19/17   0334   WBC  4.8  6.5  7.0   HGB  13.3  12.4  12.2   HCT  38.8  36.8  35.8*   PLT  268  252  274     Recent Labs      09/21/17 0427 09/18/17   2116   NA  135*  133*   K  3.7  3.6   CL  103  100   CO2  20*  23   GLU  139*  128*   BUN  12  17   CREA  0.85  0.98   CA  8.6  8.3*   ALB  3.2* 3. 5   TBILI  0.6  0.5   SGOT  23  53*   ALT  41  75       Signed: Tamica Dover MD

## 2017-09-21 NOTE — DIABETES MGMT
DTC Progress Note    Recommendations/ Comments: If appropriate, please consider resuming home medication, Glimepiride 2mg every morning before breakfast. Also may consider adding diabetic restrictions to diet. Chart reviewed on Cali Pfeiffer for hyperglycemia. Patient is a 76 y.o. male with known history of Type 2 Diabetes on Glimepiride 2mg every morning at home. A1c:   Lab Results   Component Value Date/Time    Hemoglobin A1c 5.8 01/14/2015 04:38 AM       Recent Glucose Results: Lab Results   Component Value Date/Time     (H) 09/21/2017 04:27 AM    GLUCPOC 218 (H) 09/21/2017 11:19 AM    GLUCPOC 152 (H) 09/21/2017 07:27 AM    GLUCPOC 177 (H) 09/20/2017 09:05 PM        Lab Results   Component Value Date/Time    Creatinine 0.85 09/21/2017 04:27 AM     Estimated Creatinine Clearance: 75.1 mL/min (based on Cr of 0.85). Active Orders   Diet    DIET CARDIAC Regular        PO intake: Patient Vitals for the past 72 hrs:   % Diet Eaten   09/20/17 1332 50 %   09/20/17 0819 100 %       Current hospital DM medication:   -Humalog normal sensitivity correction    Will continue to follow as needed.     Thank you    Nuvia Pina, [address] [facility]  Office: [phone]

## 2017-09-21 NOTE — PROGRESS NOTES
Oncology Nursing Communication Tool      6:58 AM  9/21/2017     Bedside shift change report given to Mini North RN (incoming nurse) by Norman Krishnamurthy RN (outgoing nurse) on Christian Mike a 76 y.o. male who was admitted on 9/18/2017  8:24 PM. Report included the following information SBAR, Kardex, MAR and Accordion. Significant changes during shift: BP trending up      Issues for physician to address: BP            Code Status: Full Code     Infections: VRE     Allergies: Review of patient's allergies indicates no known allergies. Current diet: DIET CARDIAC Regular       Pain Controlled [x] yes [] no   Bowel Movement [] yes [x] no   Last Bowel Movement (date)     9/20/17            Vital Signs:   Patient Vitals for the past 12 hrs:   Temp Pulse Resp BP SpO2   09/21/17 0013 - 71 - 184/84 -   09/20/17 2225 99.4 °F (37.4 °C) 77 20 (!) 182/97 97 %      Intake & Output:     Intake/Output Summary (Last 24 hours) at 09/21/17 0658  Last data filed at 09/21/17 0422   Gross per 24 hour   Intake              480 ml   Output             1825 ml   Net            -1345 ml      Laboratory Results:     Recent Results (from the past 12 hour(s))   GLUCOSE, POC    Collection Time: 09/20/17  9:05 PM   Result Value Ref Range    Glucose (POC) 177 (H) 65 - 100 mg/dL    Performed by Esperanza Petite)    CBC W/O DIFF    Collection Time: 09/21/17  4:27 AM   Result Value Ref Range    WBC 4.8 4.1 - 11.1 K/uL    RBC 4.41 4.10 - 5.70 M/uL    HGB 13.3 12.1 - 17.0 g/dL    HCT 38.8 36.6 - 50.3 %    MCV 88.0 80.0 - 99.0 FL    MCH 30.2 26.0 - 34.0 PG    MCHC 34.3 30.0 - 36.5 g/dL    RDW 12.8 11.5 - 14.5 %    PLATELET 654 065 - 194 K/uL              Opportunity for questions and clarifications were given to the incoming nurse. Patient's bed is in low position, side rails x2, door open PRN, call bell within reach and patient not in distress.       Norman Krishnamurthy RN

## 2017-09-22 LAB
CHOLEST SERPL-MCNC: 149 MG/DL
FSH SERPL-ACNC: 1.6 MIU/ML
GLUCOSE BLD STRIP.AUTO-MCNC: 185 MG/DL (ref 65–100)
GLUCOSE BLD STRIP.AUTO-MCNC: 203 MG/DL (ref 65–100)
GLUCOSE BLD STRIP.AUTO-MCNC: 279 MG/DL (ref 65–100)
GLUCOSE BLD STRIP.AUTO-MCNC: 319 MG/DL (ref 65–100)
HDLC SERPL-MCNC: 41 MG/DL
HDLC SERPL: 3.6 {RATIO} (ref 0–5)
IGF-I SERPL-MCNC: 32 NG/ML (ref 37–172)
LDLC SERPL CALC-MCNC: 68.8 MG/DL (ref 0–100)
LH SERPL-ACNC: 1.1 MIU/ML
LIPID PROFILE,FLP: ABNORMAL
PROLACTIN SERPL-MCNC: 12.8 NG/ML
SERVICE CMNT-IMP: ABNORMAL
TRIGL SERPL-MCNC: 196 MG/DL (ref ?–150)
VLDLC SERPL CALC-MCNC: 39.2 MG/DL

## 2017-09-22 PROCEDURE — 74011636637 HC RX REV CODE- 636/637: Performed by: INTERNAL MEDICINE

## 2017-09-22 PROCEDURE — 65270000015 HC RM PRIVATE ONCOLOGY

## 2017-09-22 PROCEDURE — 82962 GLUCOSE BLOOD TEST: CPT

## 2017-09-22 PROCEDURE — 74011250636 HC RX REV CODE- 250/636: Performed by: GENERAL ACUTE CARE HOSPITAL

## 2017-09-22 PROCEDURE — 80061 LIPID PANEL: CPT | Performed by: PSYCHIATRY & NEUROLOGY

## 2017-09-22 PROCEDURE — 74011250637 HC RX REV CODE- 250/637: Performed by: GENERAL ACUTE CARE HOSPITAL

## 2017-09-22 PROCEDURE — 36415 COLL VENOUS BLD VENIPUNCTURE: CPT | Performed by: PSYCHIATRY & NEUROLOGY

## 2017-09-22 PROCEDURE — 74011250637 HC RX REV CODE- 250/637: Performed by: INTERNAL MEDICINE

## 2017-09-22 RX ADMIN — Medication 10 ML: at 06:36

## 2017-09-22 RX ADMIN — INSULIN LISPRO 7 UNITS: 100 INJECTION, SOLUTION INTRAVENOUS; SUBCUTANEOUS at 18:09

## 2017-09-22 RX ADMIN — ENOXAPARIN SODIUM 40 MG: 40 INJECTION SUBCUTANEOUS at 12:51

## 2017-09-22 RX ADMIN — Medication 10 ML: at 13:01

## 2017-09-22 RX ADMIN — ASPIRIN 81 MG CHEWABLE TABLET 81 MG: 81 TABLET CHEWABLE at 12:51

## 2017-09-22 RX ADMIN — LISINOPRIL 40 MG: 20 TABLET ORAL at 12:52

## 2017-09-22 RX ADMIN — THERA TABS 1 TABLET: TAB at 12:52

## 2017-09-22 RX ADMIN — INSULIN LISPRO 2 UNITS: 100 INJECTION, SOLUTION INTRAVENOUS; SUBCUTANEOUS at 21:51

## 2017-09-22 RX ADMIN — ATORVASTATIN CALCIUM 40 MG: 40 TABLET, FILM COATED ORAL at 21:51

## 2017-09-22 RX ADMIN — Medication 10 ML: at 21:51

## 2017-09-22 RX ADMIN — DIPHENHYDRAMINE HYDROCHLORIDE 25 MG: 25 CAPSULE ORAL at 22:02

## 2017-09-22 RX ADMIN — ACETAMINOPHEN 650 MG: 325 TABLET ORAL at 01:50

## 2017-09-22 RX ADMIN — INSULIN LISPRO 5 UNITS: 100 INJECTION, SOLUTION INTRAVENOUS; SUBCUTANEOUS at 12:52

## 2017-09-22 NOTE — PROGRESS NOTES
NEUROLOGY NOTE       Chief Complaint   Patient presents with    Abdominal Pain     mid abdominal pain x a few days.  Fatigue     Pt's son reports pt was up last night and has been feeling nauseous and weak since last night,. Pt's son reports that pt was putting on socks, but he was acting disoriented and putting on the dirty socks instead of clean socks. Pt denies any one sided weakness. Pt does not present with slurred speech and does not present with facial droop. Pt to triage via wheelchair. SUBJECTIVE:  No new neuro c/o    HISTORY OF PRESENT ILLNESS  Sharlene Cockayne is a 76 y.o. male who presents to the hospital because of altered mental status and pituitary mass. Hx obtained by chart review. Pt states that he was lethargic but did not state any specific history about being confused. Family not at bedside at this time. According to the ER notes \" Per son, pt complained of nausea and weakness since last night. Son notes pt presenting with AMS, stating pt put on dirty socks. Son denies pt having any one sided weakness, slurred speech or facial droop. \"    Pt did have a CT head and it showed a pituitary mass.      ROS  A ten system review of constitutional, cardiovascular, respiratory, musculoskeletal, endocrine, skin, SHEENT, genitourinary, psychiatric and neurologic systems was obtained and is unremarkable except as stated in HPI     PMH  Past Medical History:   Diagnosis Date    Aneurysm ([address]     aortic    Arrhythmia     A-fib    Calculus of kidney     Cancer ([address]     Chronic kidney disease     stone    Diabetes ([address]     Fast heart beat     Former tobacco use     Hypertension     Muscle weakness     Prostate disorder     Stomach pain        FH  Family History   Problem Relation Age of Onset    Cancer Mother     Heart Disease Mother     Stroke Father     Cancer Brother     Diabetes Brother     Heart Disease Brother        [facility]  Social History     Social History    Marital status:      Spouse name: N/A    Number of children: N/A    Years of education: N/A     Social History Main Topics    Smoking status: Former Smoker     Packs/day: 0.00     Years: 60.00    Smokeless tobacco: Never Used      Comment: pipe and cigar use currently    Alcohol use No      Comment: quit -14    Drug use: No    Sexual activity: Not Asked     Other Topics Concern    None     Social History Narrative       ALLERGIES  No Known Allergies    PHYSICAL EXAM  EXAMINATION:   Patient Vitals for the past 24 hrs:   Temp Pulse Resp BP SpO2   09/22/17 0725 98.2 °F (36.8 °C) 91 16 96/74 100 %   09/22/17 0150 - - - (!) 160/99 -   09/21/17 2317 98.3 °F (36.8 °C) 95 14 (!) 179/102 97 %   09/21/17 1928 98.8 °F (37.1 °C) 87 16 (!) 166/106 95 %   09/21/17 1400 97.3 °F (36.3 °C) 81 18 141/81 94 %        General:   General appearance: Pt is in no acute distress   Distal pulses are preserved    Neurological Examination:   Mental Status:  AAO x3. Speech is fluent. Follows commands, has normal fund of knowledge, attention, short term recall, comprehension and insight. Cranial Nerves: Visual fields are full. PERRL, Extraocular movements are full. Facial sensation intact V1- V3. Facial movement intact, symmetric. Hearing intact to conversation. Palate elevates symmetrically. Shoulder shrug symmetric. Tongue midline. Motor: Strength is 5/5 in all 4 ext. Normal tone. No atrophy. Sensation: Normal to light touch    Coordination/Cerebellar: Intact to finger-nose-finger     Gait: deferred    Skin: No significant bruising or lacerations.     LAB DATA REVIEWED:    Recent Results (from the past 24 hour(s))   GLUCOSE, POC    Collection Time: 09/21/17  4:10 PM   Result Value Ref Range    Glucose (POC) 177 (H) 65 - 100 mg/dL    Performed by Eric Muhammad    GLUCOSE, POC    Collection Time: 09/21/17  9:05 PM   Result Value Ref Range    Glucose (POC) 239 (H) 65 - 100 mg/dL    Performed by [facility]  Collection Time: 09/22/17  1:47 AM   Result Value Ref Range    LIPID PROFILE          Cholesterol, total 149 <200 MG/DL    Triglyceride 196 (H) <150 MG/DL    HDL Cholesterol 41 MG/DL    LDL, calculated 68.8 0 - 100 MG/DL    VLDL, calculated 39.2 MG/DL    CHOL/HDL Ratio 3.6 0 - 5.0     GLUCOSE, POC    Collection Time: 09/22/17  7:29 AM   Result Value Ref Range    Glucose (POC) 185 (H) 65 - 100 mg/dL    Performed by Nela Coy, POC    Collection Time: 09/22/17 10:57 AM   Result Value Ref Range    Glucose (POC) 279 (H) 65 - 100 mg/dL    Performed by Malinda Dickerson         Imaging review:  MRI brain  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 Possible left cavernous sinus  invasion. 2. Chronic white matter disease and ischemic changes. No acute infarction. CT Head  1. 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 2. There are moderate changes small vessel disease periventricular white matter. There are bilateral occipital infarctions right larger than left. Stroke labs:  HgBA1c    Lab Results   Component Value Date/Time    Hemoglobin A1c 5.8 01/14/2015 04:38 AM     LDL   Lab Results   Component Value Date/Time    LDL, calculated 68.8 09/22/2017 01:47 AM        HOME MEDS  Prior to Admission Medications   Prescriptions Last Dose Informant Patient Reported? Taking?   acetaminophen (TYLENOL) 325 mg tablet Not Taking at Unknown time  No No   Sig: Take 2 Tabs by mouth every four (4) hours as needed. aspirin 81 mg chewable tablet 9/19/2017 at Unknown time  Yes Yes   Sig: Take 81 mg by mouth daily. diphenhydrAMINE (BENADRYL) 25 mg capsule Not Taking at Unknown time  Yes No   Sig: Take 25 mg by mouth every six (6) hours as needed for Itching.    diphenoxylate-atropine (LOMOTIL) 2.5-0.025 mg per tablet Not Taking at Unknown time Yes No   Sig: Take 1 Tab by mouth four (4) times daily as needed for Diarrhea. docusate sodium (COLACE) 100 mg capsule Not Taking at Unknown time  Yes No   Sig: Take 100 mg by mouth nightly. glimepiride (AMARYL) 2 mg tablet 2017 at Unknown time  Yes Yes   Sig: Take 2 mg by mouth every morning. lisinopril (PRINIVIL, ZESTRIL) 20 mg tablet 2017 at Unknown time  Yes Yes   Sig: Take 20 mg by mouth daily. meloxicam (MOBIC) 7.5 mg tablet Not Taking at Unknown time  Yes No   Sig: Take  by mouth daily. metoprolol (LOPRESSOR) 50 mg tablet   No Yes   Si Tab by Per J Tube route two (2) times a day. Patient taking differently: Take 50 mg by mouth two (2) times a day. ondansetron hcl (ZOFRAN, AS HYDROCHLORIDE,) 4 mg tablet   Yes Yes   Sig: Take 4 mg by mouth every eight (8) hours as needed for Nausea. oxyCODONE-acetaminophen (PERCOCET) 5-325 mg per tablet   No No   Sig: Take 1 Tab by mouth every four (4) hours as needed. Max Daily Amount: 6 Tabs. pantoprazole (PROTONIX) 40 mg tablet   No Yes   Sig: Take 1 Tab by mouth daily. simvastatin (ZOCOR) 40 mg tablet   Yes Yes   Sig: Take 40 mg by mouth nightly. therapeutic multivitamin (THERA) tablet   Yes Yes   Sig: Take 1 Tab by mouth daily. trimethoprim-sulfamethoxazole (BACTRIM DS) 160-800 mg per tablet   Yes Yes   Sig: Take 1 Tab by mouth two (2) times a day.       Facility-Administered Medications: None       CURRENT MEDS  Current Facility-Administered Medications   Medication Dose Route Frequency    lisinopril (PRINIVIL, ZESTRIL) tablet 40 mg  40 mg Oral DAILY    insulin lispro (HUMALOG) injection   SubCUTAneous AC&HS    aspirin chewable tablet 81 mg  81 mg Oral DAILY    atorvastatin (LIPITOR) tablet 40 mg  40 mg Oral QHS    therapeutic multivitamin (THERAGRAN) tablet 1 Tab  1 Tab Oral DAILY    sodium chloride (NS) flush 5-10 mL  5-10 mL IntraVENous Q8H    enoxaparin (LOVENOX) injection 40 mg  40 mg SubCUTAneous DAILY IMPRESSION:  Tasia Osorio is a 76 y.o. male who presents with altered mental status. Pt had a fever but with no clear etiology. Pt is AAO X 3 now. EEG is normal. CT head did show a pituitary mass and bilateral occipital infarct. Will proceed with MRI of the brain with and without contrast.    1. Altered mental statusresolved  2. Bilateral occipital infarct  3. Pituitary mass -macroadenoma    RECOMMENDATIONS:  - EEG - normal  - MRI brain -macroadenoma of the pituitary. Neurosurgery consulted. Awaiting recommendation.  - Blood work - initial w/u is normal.  - Cont ASA 81 mg a day  - BP goal is less than 140/90  -Continue atorvastatin 40 mg daily    No further neurological workup. Awaiting neurosurgery recommendation. Call with questions.     Amanda Tobin MD  Neurologist

## 2017-09-22 NOTE — PROGRESS NOTES
Spiritual Care Assessment/Progress Notes    Claudetta Askew [id number]  [id number]    1940  76 y.o.  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   []         Not able to assess due to medical condition    Services Provided Today:  []         crisis intervention    []         reading Scriptures  [x]         spiritual assessment    []         prayer  [x]         empathic listening/emotional support  []         rites and rituals (cite in comments)  []         life review     []         Uatsdin support  []         theological development   []         advocacy  []         ethical dialog     []         blessing  []         bereavement support    []         support to family  []         anticipatory grief support   []         help with AMD  []         spiritual guidance    []         meditation      Spiritual Care Needs  [] Emotional Support  []         Spiritual/Worship Care  []         Loss/Adjustment  []         Advocacy/Referral                /Ethics  [x]         No needs expressed at               this time  []         Other: (note in               comments)  Spiritual Care Plan  []         Follow up visits with               pt/family  []         Provide materials  []         Schedule sacraments  []         Contact Community               Clergy  [x]         Follow up as needed  []         Other: (note in               comments)     Comments:  Initial visit on Oncology unit for spiritual assessment. No visitors present. When I introduced myself to patient as the  from [facility], he indicated he is okay in that department. He expressed no concerns or needs at this time. Advised him of  availability.     LUNA Aceves, [facility], [address]     Paging Service  [phone]

## 2017-09-22 NOTE — PROGRESS NOTES
Nutrition Services      Nutrition Screen:  Wt Readings from Last 10 Encounters:   09/21/17 82 kg (180 lb 12.4 oz)   08/04/17 85.7 kg (189 lb)   01/27/17 90.7 kg (200 lb)   11/15/16 85.4 kg (188 lb 4.4 oz)   07/08/16 82.1 kg (181 lb)   03/02/16 80.3 kg (177 lb)   01/14/16 82.9 kg (182 lb 12.8 oz)   01/08/16 82.4 kg (181 lb 9.6 oz)   11/12/15 80.3 kg (177 lb)   10/13/15 79.5 kg (175 lb 4.3 oz)     Body mass index is 25.21 kg/(m^2). Supplements:                        _____ ordered ______  declined. __ __  Pt is nutritionally stable at this time, will rescreen in 7 days. _x __    Pt is at nutritional risk and will be rescreened in 3-6 days. __ __  Pt is at moderate or high nutritional risk, will refer to RD for assessment.        Eleno Lara  Dietetic Technician, Registered

## 2017-09-22 NOTE — ROUTINE PROCESS
Oncology Nursing Communication Tool      5:51 AM  9/22/2017     Bedside shift change report given to Bonita Boyle RN (incoming nurse) by Jake Oates RN (outgoing nurse) on Conner Men a 76 y.o. male who was admitted on 9/18/2017  8:24 PM. Report included the following information SBAR, Kardex, Intake/Output, MAR and Recent Results. Significant changes during shift: pt does not remember much from yesterday afternoon or evening due to sedation for procedure. Med for h/a pain x1 overnight. BP up & prn hydralazine given by offgoing RN at change of shift. Neurosurgery consult to be called this am.      Issues for physician to address: none            Code Status: Full Code     Infections: VRE     Allergies: Review of patient's allergies indicates no known allergies. Current diet: DIET CARDIAC Regular       Pain Controlled [x] yes [] no   Bowel Movement [] yes [x] no   Last Bowel Movement (date) 9/21/17            Vital Signs:   Patient Vitals for the past 12 hrs:   Temp Pulse Resp BP SpO2   09/22/17 0150 - - - (!) 160/99 -   09/21/17 2317 98.3 °F (36.8 °C) 95 14 (!) 179/102 97 %   09/21/17 1928 98.8 °F (37.1 °C) 87 16 (!) 166/106 95 %      Intake & Output:     Intake/Output Summary (Last 24 hours) at 09/22/17 0551  Last data filed at 09/22/17 0149   Gross per 24 hour   Intake                0 ml   Output              775 ml   Net             -775 ml      Laboratory Results:     Recent Results (from the past 12 hour(s))   GLUCOSE, POC    Collection Time: 09/21/17  9:05 PM   Result Value Ref Range    Glucose (POC) 239 (H) 65 - 100 mg/dL    Performed by Ifrah Yanes for questions and clarifications were given to the incoming nurse. Patient's bed is in low position, side rails x2, door open PRN, call bell within reach and patient not in distress.       Jake Oates RN

## 2017-09-22 NOTE — PROGRESS NOTES
Oncology Nursing Communication Tool      6:48 PM  9/22/2017     Bedside and Verbal shift change report given to Kenan Grey RN (incoming nurse) by Maxime Rucker RN (outgoing nurse) on Sloan Rodriguez a 76 y.o. male who was admitted on 9/18/2017  8:24 PM. Report included the following information SBAR, Kardex, Procedure Summary, MAR, Accordion and Recent Results. Significant changes during shift: MRI results      Issues for physician to address: Plan of care            Code Status: Full Code     Infections: VRE     Allergies: Review of patient's allergies indicates no known allergies. Current diet: DIET CARDIAC Regular       Pain Controlled [x] yes [] no   Bowel Movement [] yes [x] no   Last Bowel Movement (date)     9/20/17            Vital Signs:   Patient Vitals for the past 12 hrs:   Temp Pulse Resp BP SpO2   09/22/17 1400 98 °F (36.7 °C) 91 18 115/78 97 %   09/22/17 0725 98.2 °F (36.8 °C) 91 16 96/74 100 %      Intake & Output:     Intake/Output Summary (Last 24 hours) at 09/22/17 1848  Last data filed at 09/22/17 2925   Gross per 24 hour   Intake               50 ml   Output             1150 ml   Net            -1100 ml      Laboratory Results:     Recent Results (from the past 12 hour(s))   GLUCOSE, POC    Collection Time: 09/22/17  7:29 AM   Result Value Ref Range    Glucose (POC) 185 (H) 65 - 100 mg/dL    Performed by Sanjeev Amanda    GLUCOSE, POC    Collection Time: 09/22/17 10:57 AM   Result Value Ref Range    Glucose (POC) 279 (H) 65 - 100 mg/dL    Performed by Cely Aguilar    GLUCOSE, POC    Collection Time: 09/22/17  4:57 PM   Result Value Ref Range    Glucose (POC) 319 (H) 65 - 100 mg/dL    Performed by [address] [place] [address] for questions and clarifications were given to the incoming nurse. Patient's bed is in low position, side rails x2, door open PRN, call bell within reach and patient not in distress.       Maxime Rucker RN

## 2017-09-22 NOTE — PROGRESS NOTES
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 No meningeal signs, no headache, no photophobia, AOx3. Flu negative as well. Blood cx NTD.  -CT brain ordered yesterday showed pituitary mass and also  occipital infarctions. -MRI showed pituitary macroadenoma with compression of surround structures and Neurosurgery consult was placed  -c/w asa and statin. Neurology following. DMII  -no evidence of hypoglycemic episodes  -Monitor ac qhs  -c/w ssi    HTN  - lisinopril  40 mg daily and add prn hydralazine  - few episodes of high blood pressure yesterday but currently normal    Hx of Duodenal Ca s/p surgical resection     Code Status: Full  Surrogate Decision Maker: Son     DVT Prophylaxis: Lovenox  GI Prophylaxis: not indicated     Baseline: Independent      Body mass index is 25.21 kg/(m^2). Recommended Disposition: Home w/Family     Subjective: No new complaints today. Blood pressure was high last nite but now normal. Denies headache or fever. Chief Complaint / Reason for Physician Visit  AMS    Review of Systems:  Symptom Y/N Comments  Symptom Y/N Comments   Fever/Chills n   Chest Pain n    Poor Appetite    Edema     Cough n   Abdominal Pain     Sputum    Joint Pain n    SOB/EVANS n   Pruritis/Rash     Nausea/vomit    Tolerating PT/OT     Diarrhea    Tolerating Diet y    Constipation    Other       Could NOT obtain due to:      Objective:     VITALS:   Last 24hrs VS reviewed since prior progress note.  Most recent are:  Patient Vitals for the past 24 hrs:   Temp Pulse Resp BP SpO2   17 0725 98.2 °F (36.8 °C) 91 16 96/74 100 %   17 0150 - - - (!) 160/99 -   09/21/17 2317 98.3 °F (36.8 °C) 95 14 (!) 179/102 97 %   09/21/17 1928 98.8 °F (37.1 °C) 87 16 (!) 166/106 95 %   09/21/17 1400 97.3 °F (36.3 °C) 81 18 141/81 94 %       Intake/Output Summary (Last 24 hours) at 09/22/17 1324  Last data filed at 09/22/17 9070   Gross per 24 hour   Intake               50 ml   Output             1150 ml   Net            -1100 ml        PHYSICAL EXAM:  General: WD, WN. Alert, cooperative, no acute distress    EENT:  EOMI. Anicteric sclerae. MMM  Resp:  CTA bilaterally, no wheezing or rales. No accessory muscle use  CV:  Regular  rhythm,  No edema  GI:  Soft, Non distended, Non tender.  +Bowel sounds  Neurologic:  Alert and oriented X 3, normal speech,   Psych:   Good insight. Not anxious nor agitated  Skin:  No rashes.   No jaundice    Reviewed most current lab test results and cultures  YES  Reviewed most current radiology test results   YES  Review and summation of old records today    NO  Reviewed patient's current orders and MAR    YES  PMH/SH reviewed - no change compared to H&P      Current Facility-Administered Medications:     hydrALAZINE (APRESOLINE) 20 mg/mL injection 10 mg, 10 mg, IntraVENous, Q6H PRN, Ambrose Ortega MD, 10 mg at 09/21/17 2331    lisinopril (PRINIVIL, ZESTRIL) tablet 40 mg, 40 mg, Oral, DAILY, Ambrose Ortega MD, 40 mg at 09/22/17 1252    insulin lispro (HUMALOG) injection, , SubCUTAneous, AC&HS, Ambrose Ortega MD, 5 Units at 09/22/17 1252    aspirin chewable tablet 81 mg, 81 mg, Oral, DAILY, Homero Gipson MD, 81 mg at 09/22/17 1251    diphenhydrAMINE (BENADRYL) capsule 25 mg, 25 mg, Oral, Q6H PRN, Homero Gipson MD, 25 mg at 09/20/17 2116    atorvastatin (LIPITOR) tablet 40 mg, 40 mg, Oral, QHS, Homero Gipson MD, 40 mg at 09/21/17 2159    therapeutic multivitamin (THERAGRAN) tablet 1 Tab, 1 Tab, Oral, DAILY, Homero Gipson MD, 1 Tab at 09/22/17 1252    sodium chloride (NS) flush 5-10 mL, 5-10 mL, IntraVENous, Q8H, Francis Neil MD, 10 mL at 09/22/17 1301    sodium chloride (NS) flush 5-10 mL, 5-10 mL, IntraVENous, PRN, Rajni Hadley MD    acetaminophen (TYLENOL) tablet 650 mg, 650 mg, Oral, Q4H PRN, Rajni Hadley MD, 650 mg at 09/22/17 0150    enoxaparin (LOVENOX) injection 40 mg, 40 mg, SubCUTAneous, DAILY, Rajni Hadley MD, 40 mg at 09/22/17 1251    glucose chewable tablet 16 g, 4 Tab, Oral, PRN, Rajni Hadley MD    dextrose (D50W) injection syrg 12.5-25 g, 12.5-25 g, IntraVENous, PRN, Rajni Hadley MD    glucagon [facility]) injection 1 mg, 1 mg, IntraMUSCular, PRN, Rajni Hadley MD    ________________________________________________________________________  Care Plan discussed with:    Comments   Patient y    Family  y Son Eliezer La RN y    Care Manager     Consultant                        Multidiciplinary team rounds were held today with , nursing, pharmacist and clinical coordinator. Patient's plan of care was discussed; medications were reviewed and discharge planning was addressed. ________________________________________________________________________  Total NON critical care TIME:  25  Minutes    Total CRITICAL CARE TIME Spent:   Minutes non procedure based      Comments   >50% of visit spent in counseling and coordination of care     ________________________________________________________________________  Maria T Barreto MD     Procedures: see electronic medical records for all procedures/Xrays and details which were not copied into this note but were reviewed prior to creation of Plan. LABS:  I reviewed today's most current labs and imaging studies.   Pertinent labs include:  Recent Labs      09/21/17 0427 09/20/17   0430   WBC  4.8  6.5   HGB  13.3  12.4   HCT  38.8  36.8   PLT  268  252     Recent Labs      09/21/17 0427   NA  135*   K  3.7   CL  103   CO2  20*   GLU  139*   BUN  12   CREA  0.85   CA  8.6   ALB  3.2*   TBILI  0.6   SGOT  23   ALT  41       Signed: Maria T Barreto MD

## 2017-09-23 LAB
GLUCOSE BLD STRIP.AUTO-MCNC: 186 MG/DL (ref 65–100)
GLUCOSE BLD STRIP.AUTO-MCNC: 234 MG/DL (ref 65–100)
GLUCOSE BLD STRIP.AUTO-MCNC: 261 MG/DL (ref 65–100)
GLUCOSE BLD STRIP.AUTO-MCNC: 268 MG/DL (ref 65–100)
SERVICE CMNT-IMP: ABNORMAL

## 2017-09-23 PROCEDURE — 82962 GLUCOSE BLOOD TEST: CPT

## 2017-09-23 PROCEDURE — 84146 ASSAY OF PROLACTIN: CPT | Performed by: SPECIALIST

## 2017-09-23 PROCEDURE — 74011250636 HC RX REV CODE- 250/636: Performed by: GENERAL ACUTE CARE HOSPITAL

## 2017-09-23 PROCEDURE — 74011250637 HC RX REV CODE- 250/637: Performed by: INTERNAL MEDICINE

## 2017-09-23 PROCEDURE — 74011250637 HC RX REV CODE- 250/637: Performed by: GENERAL ACUTE CARE HOSPITAL

## 2017-09-23 PROCEDURE — 65270000015 HC RM PRIVATE ONCOLOGY

## 2017-09-23 PROCEDURE — 74011636637 HC RX REV CODE- 636/637: Performed by: INTERNAL MEDICINE

## 2017-09-23 PROCEDURE — 36415 COLL VENOUS BLD VENIPUNCTURE: CPT | Performed by: SPECIALIST

## 2017-09-23 RX ORDER — METOPROLOL TARTRATE 50 MG/1
50 TABLET ORAL 2 TIMES DAILY
Status: DISCONTINUED | OUTPATIENT
Start: 2017-09-23 | End: 2017-09-24 | Stop reason: HOSPADM

## 2017-09-23 RX ADMIN — ASPIRIN 81 MG CHEWABLE TABLET 81 MG: 81 TABLET CHEWABLE at 09:10

## 2017-09-23 RX ADMIN — Medication 10 ML: at 21:14

## 2017-09-23 RX ADMIN — Medication 5 ML: at 06:44

## 2017-09-23 RX ADMIN — INSULIN LISPRO 3 UNITS: 100 INJECTION, SOLUTION INTRAVENOUS; SUBCUTANEOUS at 22:51

## 2017-09-23 RX ADMIN — METOPROLOL TARTRATE 50 MG: 50 TABLET ORAL at 17:21

## 2017-09-23 RX ADMIN — THERA TABS 1 TABLET: TAB at 09:10

## 2017-09-23 RX ADMIN — INSULIN LISPRO 3 UNITS: 100 INJECTION, SOLUTION INTRAVENOUS; SUBCUTANEOUS at 17:21

## 2017-09-23 RX ADMIN — ENOXAPARIN SODIUM 40 MG: 40 INJECTION SUBCUTANEOUS at 09:09

## 2017-09-23 RX ADMIN — ATORVASTATIN CALCIUM 40 MG: 40 TABLET, FILM COATED ORAL at 22:00

## 2017-09-23 RX ADMIN — Medication 10 ML: at 16:44

## 2017-09-23 RX ADMIN — INSULIN LISPRO 2 UNITS: 100 INJECTION, SOLUTION INTRAVENOUS; SUBCUTANEOUS at 09:09

## 2017-09-23 RX ADMIN — LISINOPRIL 40 MG: 20 TABLET ORAL at 09:09

## 2017-09-23 RX ADMIN — INSULIN LISPRO 5 UNITS: 100 INJECTION, SOLUTION INTRAVENOUS; SUBCUTANEOUS at 11:51

## 2017-09-23 NOTE — PROGRESS NOTES
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 No meningeal signs, no headache, no photophobia, AOx3. Flu negative as well. Blood cx NTD.  -CT brain ordered yesterday showed pituitary mass and also  occipital infarctions. 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 -c/w asa and statin for previous occipital CVA. DMII  -no evidence of hypoglycemic episodes  -Monitor ac qhs  -c/w ssi    HTN  - lisinopril  40 mg daily and add prn hydralazine. Metoprolol was added which is his home medication. - few episodes of high blood pressure yesterday but currently normal    Hx of Duodenal Ca s/p surgical resection     Code Status: Full  Surrogate Decision Maker: Son     DVT Prophylaxis: Lovenox  GI Prophylaxis: not indicated     Baseline: Independent      Body mass index is 25.21 kg/(m^2). Recommended Disposition: Home w/Family     Subjective: No new symptoms today. Pt wants time before he can proceed with surgery. Wants to go to rehab. Chief Complaint / Reason for Physician Visit  AMS    Review of Systems:  Symptom Y/N Comments  Symptom Y/N Comments   Fever/Chills n   Chest Pain n    Poor Appetite    Edema     Cough n   Abdominal Pain     Sputum    Joint Pain n    SOB/EVANS n   Pruritis/Rash     Nausea/vomit    Tolerating PT/OT     Diarrhea    Tolerating Diet y    Constipation    Other       Could NOT obtain due to:      Objective:     VITALS:   Last 24hrs VS reviewed since prior progress note.  Most recent are:  Patient Vitals for the past 24 hrs: Temp Pulse Resp BP SpO2   09/23/17 1430 97.8 °F (36.6 °C) 85 18 141/84 96 %   09/23/17 0800 98.2 °F (36.8 °C) 82 18 (!) 155/96 97 %   09/22/17 2323 97.4 °F (36.3 °C) 79 18 (!) 154/97 95 %   09/22/17 1943 98.2 °F (36.8 °C) 86 18 (!) 161/102 97 %       Intake/Output Summary (Last 24 hours) at 09/23/17 1644  Last data filed at 09/23/17 0152   Gross per 24 hour   Intake                0 ml   Output              300 ml   Net             -300 ml        PHYSICAL EXAM:  General: WD, WN. Alert, cooperative, no acute distress    EENT:  EOMI. Anicteric sclerae. MMM  Resp:  CTA bilaterally, no wheezing or rales. No accessory muscle use  CV:  Regular  rhythm,  No edema  GI:  Soft, Non distended, Non tender.  +Bowel sounds  Neurologic:  Alert and oriented X 3, normal speech,   Psych:   Good insight. Not anxious nor agitated  Skin:  No rashes.   No jaundice    Reviewed most current lab test results and cultures  YES  Reviewed most current radiology test results   YES  Review and summation of old records today    NO  Reviewed patient's current orders and MAR    YES  PMH/SH reviewed - no change compared to H&P      Current Facility-Administered Medications:     metoprolol tartrate (LOPRESSOR) tablet 50 mg, 50 mg, Oral, BID, Romana Peek, MD    dextrose 10 % infusion 125-250 mL, 125-250 mL, IntraVENous, PRN, Romana Peek, MD    hydrALAZINE (APRESOLINE) 20 mg/mL injection 10 mg, 10 mg, IntraVENous, Q6H PRN, Romana Peek, MD, 10 mg at 09/21/17 2331    lisinopril (PRINIVIL, ZESTRIL) tablet 40 mg, 40 mg, Oral, DAILY, Romana Peek, MD, 40 mg at 09/23/17 0909    insulin lispro (HUMALOG) injection, , SubCUTAneous, AC&HS, Romana Peek, MD, 5 Units at 09/23/17 1151    aspirin chewable tablet 81 mg, 81 mg, Oral, DAILY, Nancy Nixon MD, 81 mg at 09/23/17 0910    diphenhydrAMINE (BENADRYL) capsule 25 mg, 25 mg, Oral, Q6H PRN, Nancy Nixon MD, 25 mg at 09/22/17 2202    atorvastatin (LIPITOR) tablet 40 mg, 40 mg, Oral, QHS, Francis GRULLON John Alcala MD, 40 mg at 09/22/17 2151    therapeutic multivitamin (THERAGRAN) tablet 1 Tab, 1 Tab, Oral, DAILY, Nikki Benavides MD, 1 Tab at 09/23/17 0910    sodium chloride (NS) flush 5-10 mL, 5-10 mL, IntraVENous, Q8H, Nikki Benavides MD, 10 mL at 09/23/17 1644    sodium chloride (NS) flush 5-10 mL, 5-10 mL, IntraVENous, PRN, Nikki Benavides MD    acetaminophen (TYLENOL) tablet 650 mg, 650 mg, Oral, Q4H PRN, Nikki Benavides MD, 650 mg at 09/22/17 0150    enoxaparin (LOVENOX) injection 40 mg, 40 mg, SubCUTAneous, DAILY, Nikki Benavides MD, 40 mg at 09/23/17 5093    glucose chewable tablet 16 g, 4 Tab, Oral, PRN, Nikki Benavides MD    glucagon (GLUCAGEN) injection 1 mg, 1 mg, IntraMUSCular, PRN, Nikki Benavides MD    ________________________________________________________________________  Care Plan discussed with:    Comments   Patient y    Family  y Son Vilma Sow RN y    Care Manager     Consultant                        Multidiciplinary team rounds were held today with , nursing, pharmacist and clinical coordinator. Patient's plan of care was discussed; medications were reviewed and discharge planning was addressed. ________________________________________________________________________  Total NON critical care TIME:  25  Minutes    Total CRITICAL CARE TIME Spent:   Minutes non procedure based      Comments   >50% of visit spent in counseling and coordination of care     ________________________________________________________________________  Rona Kapoor MD     Procedures: see electronic medical records for all procedures/Xrays and details which were not copied into this note but were reviewed prior to creation of Plan. LABS:  I reviewed today's most current labs and imaging studies.   Pertinent labs include:  Recent Labs      09/21/17 0427   WBC  4.8   HGB  13.3   HCT  38.8   PLT  268     Recent Labs      09/21/17 0427   NA  135*   K  3.7   CL  103   CO2  20*   GLU  139*   BUN  12   CREA  0.85 CA  8.6   ALB  3.2*   TBILI  0.6   SGOT  23   ALT  41       Signed: Romana Peek, MD      Case mgmt consult was placed for placement in rehab and d/w wisam from case mgmt and  Also d/w pt's son and explained plan of care. He wants the pt to go to rehab.

## 2017-09-23 NOTE — CONSULTS
[address]    [facility]       Name:  Alcon Matta   MR#:  [id number]   :  1940   Account #:  [de-identified]    Date of Consultation:  2017   Date of Adm:  2017       REASON FOR CONSULTATION: Pituitary tumor. HISTORY: The patient is a 59-year-old gentleman. He was brought to   the hospital by his son. I talked to the son by phone, as well as   reviewed the notes. He was just not feeling well with some nausea and   feeling off.  abdominal pain, weakness. He was putting on dirty socks   and was having  an episode of confusion. Apparently the confusion   has resolved, though he does have a little bit of circular thinking, going   around and around while we discuss. He had one fever, which has   resolved. Because of the confusion he had a CT scan done, which   showed a pituitary tumor, followed by an MRI. We were called for   evaluation. He notes that his glasses have changed, but he did have a   corneal surgery  The patient denies that he has been confused. He is   feeling much better now. PAST MEDICAL HISTORY: Aortic aneurysm, arrhythmia, pancreatic   cancer, status post Whipple, diabetes, hypertension. SOCIAL HISTORY: Former smoker. FAMILY HISTORY: Cancer, heart disease, stroke. ALLERGIES: NO KNOWN DRUG ALLERGIES. MEDICATIONS:   1. Amaryl. 2. Mobic. 3. Aspirin. 4. Bactrim. 5. Prinivil. 6. Zofran. 7. Zocor. 8. Lopressor. 9. Protonix. 10. Colace. 11. Percocet. 12. Benadryl. REVIEW OF SYSTEMS: Now no nausea, vomiting, but he had some   nausea before. No chest pain, no shortness of breath. Rest of his 10   systems were reviewed and are negative, except as above. PHYSICAL EXAMINATION:   GENERAL: He is an elderly gentleman sitting in no acute distress. VITAL SIGNS: Temperature is 98.2, pulse is 96, blood pressure   160/100, respirations 18, saturations 97% on room air. NEUROLOGIC: His head is normocephalic, atraumatic. Pupils equal,   regular, react to light. It is postsurgical. It is difficult to test his visual fields. Extraocular movements are intact. Face is symmetric. Tongue and   uvula midline. Shoulder shrug is normal. He does not have a pronator   drift. His strength is 5/5 in his upper and lower extremities. Sensation is   grossly intact to light touch. Deep tendon reflexes are intact. He is   awake, alert, oriented x3. His speech is fluent. Recent and remote   memory appear adequate. Fund of knowledge appears adequate. Cerebellar exam is intact. 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 PLAN: I discussed the findings with the patient, as well as his son by   phone. I am not convinced that this is what is causing his confusion,   certainly not his fever. We discussed  This may have to be removed. He is   not sure  He wants to do this at 76. He thought he should get some other opinions as well. I think if he is stable he can go home and follow up in the clinic next   week. I have given the son phone number and I have discussed the   situation, asked him to call Monday morning so we can get followup   next week and then discuss surgical options with the son in the room   as well. He expressed understanding. Please call if needed.         Angelique Loco MD MM / Ajith Pérez   D:  09/22/2017   22:03   T:  09/22/2017   23:03   Job #:  [id number]

## 2017-09-23 NOTE — PROGRESS NOTES
Oncology Nursing Communication Tool      11:53 AM  9/23/2017     Bedside shift change report given to Amado Davis RN (incoming nurse) by Shantell Christian RN (outgoing nurse) on Estephanie Back a 76 y.o. male who was admitted on 9/18/2017  8:24 PM. Report included the following information SBAR, Kardex, Intake/Output, MAR, Accordion and Recent Results. Significant changes during shift: No significant change      Issues for physician to address: No issue at this time            Code Status: Full Code     Infections: VRE     Allergies: Review of patient's allergies indicates no known allergies. Current diet: DIET CARDIAC Regular       Pain Controlled [x] yes [] no   Bowel Movement [x] yes [] no   Last Bowel Movement (date) 9/23/17            Vital Signs:   Patient Vitals for the past 12 hrs:   Temp Pulse Resp BP SpO2   09/23/17 0800 98.2 °F (36.8 °C) 82 18 (!) 155/96 97 %      Intake & Output:     Intake/Output Summary (Last 24 hours) at 09/23/17 1153  Last data filed at 09/23/17 0152   Gross per 24 hour   Intake                0 ml   Output              300 ml   Net             -300 ml      Laboratory Results:     Recent Results (from the past 12 hour(s))   GLUCOSE, POC    Collection Time: 09/23/17  8:08 AM   Result Value Ref Range    Glucose (POC) 186 (H) 65 - 100 mg/dL    Performed by Dylan Butler    GLUCOSE, POC    Collection Time: 09/23/17 11:31 AM   Result Value Ref Range    Glucose (POC) 268 (H) 65 - 100 mg/dL    Performed by Whitney Bailey for questions and clarifications were given to the incoming nurse. Patient's bed is in low position, side rails x2, door open PRN, call bell within reach and patient not in distress.       Shantell Christian RN

## 2017-09-23 NOTE — PROGRESS NOTES
Pt is a 77 y/o male that was admitted on 17 d/t a Dx of Encephalopathy W one episode of fever. 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 DMT2, HTN, Duodenal Cancer s/p surgical resection. Pt is a Full Code. Pt is alert and oriented. Pt lives alone in 2 story home with 1-2 SILVIA. Laundry is in basement. Pt's wife  three years ago. 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 Pt son lives [place] and is supportive. Pt has a cane and rollator at home. Pt has had HH in the past but can't remember the name of the company. Pt has been to [facility] in the past.  Pt indicated that he was I W ADL and IADLs prior to hospitalization. Pt was driving. Pt preferred pharmacy is [facility]. CM was consulted indicating that Pt was interested in going to rehab. CM offered FOC and Pt gave verbal consent for SNF: [facility] is the Pt first choice. CM will send referral via [address] [place] [address] Ave. CM called Stefano Salas in Admissions [phone] to alert her about the referral and check bed availability. CM will continue to monitor discharge plan. Care Management Interventions  PCP Verified by CM: Yes  Mode of Transport at Discharge:  (TBD)  Transition of Care Consult (CM Consult): Discharge Planning  Discharge Durable Medical Equipment: No (Pt has a Rollator and Cane at home. )  Physical Therapy Consult: Yes  Current Support Network: Lives Alone, Own Home, Family Lives Nearby (Pt lives alone in two story home with 1-2 SILVIA.   Son lives closeby. )  Confirm Follow Up Transport: Self  Discharge Location  Discharge Placement:  (TBD)    Cris[facility], [address]   [phone]

## 2017-09-23 NOTE — CONSULTS
Pt seen and examined, full consult to follow. Episode of confusion - Gone now? Although some circular reasoning while talking. One episode of fever. CT and MRI brain shows pituitary tumor. I do not think this is the cause of confusion. But is extending into the suprasellar cistern which measures 2.0 x 1.6 x 1.6 cm    I discussed with him that it may have to ne removed. He is not sure as he is 76. He thought he should get a second and third opinion. If stable he can go home, and follow up in clinic next week. I talked to the son by phone and discussed the situation. I gave him the clinic number and asked him to call on Monday Morning so we can get follow up next week      Then we can discuss surgical options.      Summer Aparicio MD

## 2017-09-24 VITALS
BODY MASS INDEX: 25.31 KG/M2 | TEMPERATURE: 97.9 F | WEIGHT: 180.78 LBS | HEART RATE: 71 BPM | RESPIRATION RATE: 16 BRPM | DIASTOLIC BLOOD PRESSURE: 95 MMHG | SYSTOLIC BLOOD PRESSURE: 165 MMHG | HEIGHT: 71 IN | OXYGEN SATURATION: 98 %

## 2017-09-24 LAB
BACTERIA SPEC CULT: NORMAL
BACTERIA SPEC CULT: NORMAL
GLUCOSE BLD STRIP.AUTO-MCNC: 167 MG/DL (ref 65–100)
GLUCOSE BLD STRIP.AUTO-MCNC: 266 MG/DL (ref 65–100)
SERVICE CMNT-IMP: ABNORMAL
SERVICE CMNT-IMP: ABNORMAL
SERVICE CMNT-IMP: NORMAL
SERVICE CMNT-IMP: NORMAL

## 2017-09-24 PROCEDURE — 74011250637 HC RX REV CODE- 250/637: Performed by: GENERAL ACUTE CARE HOSPITAL

## 2017-09-24 PROCEDURE — 82962 GLUCOSE BLOOD TEST: CPT

## 2017-09-24 PROCEDURE — 74011250636 HC RX REV CODE- 250/636: Performed by: GENERAL ACUTE CARE HOSPITAL

## 2017-09-24 PROCEDURE — 74011250637 HC RX REV CODE- 250/637: Performed by: INTERNAL MEDICINE

## 2017-09-24 PROCEDURE — 74011636637 HC RX REV CODE- 636/637: Performed by: INTERNAL MEDICINE

## 2017-09-24 RX ORDER — GUAIFENESIN 100 MG/5ML
81 LIQUID (ML) ORAL DAILY
Qty: 30 TAB | Refills: 1 | Status: SHIPPED | OUTPATIENT
Start: 2017-09-24 | End: 2017-10-24

## 2017-09-24 RX ORDER — THERA TABS 400 MCG
1 TAB ORAL DAILY
Qty: 30 TAB | Refills: 1 | Status: SHIPPED | OUTPATIENT
Start: 2017-09-24 | End: 2017-10-24

## 2017-09-24 RX ORDER — LISINOPRIL 40 MG/1
40 TABLET ORAL DAILY
Qty: 30 TAB | Refills: 1 | Status: SHIPPED | OUTPATIENT
Start: 2017-09-24 | End: 2017-10-24

## 2017-09-24 RX ADMIN — METOPROLOL TARTRATE 50 MG: 50 TABLET ORAL at 09:22

## 2017-09-24 RX ADMIN — LISINOPRIL 40 MG: 20 TABLET ORAL at 09:22

## 2017-09-24 RX ADMIN — INSULIN LISPRO 2 UNITS: 100 INJECTION, SOLUTION INTRAVENOUS; SUBCUTANEOUS at 09:22

## 2017-09-24 RX ADMIN — ENOXAPARIN SODIUM 40 MG: 40 INJECTION SUBCUTANEOUS at 09:21

## 2017-09-24 RX ADMIN — INSULIN LISPRO 5 UNITS: 100 INJECTION, SOLUTION INTRAVENOUS; SUBCUTANEOUS at 12:26

## 2017-09-24 RX ADMIN — THERA TABS 1 TABLET: TAB at 09:23

## 2017-09-24 RX ADMIN — Medication 10 ML: at 09:26

## 2017-09-24 RX ADMIN — ASPIRIN 81 MG CHEWABLE TABLET 81 MG: 81 TABLET CHEWABLE at 09:23

## 2017-09-24 NOTE — PROGRESS NOTES
CM saw that [facility] has accepted this Pt. CM called [facility] to confirm that they could accept him today. Trudy cell number is [phone]. CM will let MD know that they can take him today. CM will let RN know. Pt will go down and talk to Pt and staff about discharge. CM issued Second IM Letter and signed copy on bedside chart. 10:06 AM   CM spoke to Pt and Son about discharge plan to go to [facility] today. Son has to work this afternoon and is unable to transport him. Son requested that he be set up to transport to [facility] in a cab. Pt and RN comfortable with that plan. Son is going to come and bring him at least $25.00 to cover the cost of the cab to [facility]. Son is going to bring him some clothes to take to [facility]. Plan is once MD has put discharge order in and Pt is ready to go. . RN will call [facility] [phone] to arrange for a cab to come a pick him up. [facility] will have to call the unit when they arrive and staff can take him out to the front of the hospital and help to transfer him into the car safely. Pt will need to call [facility] when he arrives so that there staff can assist him safely to the rehab. [facility] Number to call report to [phone]. Please fax DC Summary and instructions to [phone]. No further CM needs. Care Management Interventions  PCP Verified by CM: Yes  Mode of Transport at Discharge:  Other (see comment) (Pt will go via [facility])  Transition of Care Consult (CM Consult): SNF ([facility])  Partner SNF: Yes  Discharge Durable Medical Equipment: No  Physical Therapy Consult: Yes  Occupational Therapy Consult: Yes  Speech Therapy Consult: No  Current Support Network: Lives Alone, Own Home, Family Lives Nearby  Confirm Follow Up Transport: Self  Plan discussed with Pt/Family/Caregiver: Yes  Freedom of Choice Offered: Yes  Discharge Location  Discharge Placement: Skilled nursing facility ([facility])    [facility]eren[facility] Parker [facility], [address]

## 2017-09-24 NOTE — DISCHARGE SUMMARY
Hospitalist Discharge Summary     Patient ID:  Yefri Negro  [id number]  74 y.o.  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  male who presents with CC listed above. Pt.'s symptoms were noted by his son as per the EMS reports, however he is not at bedside and initial call placed did not elicit a response. As per the pt himself, he feels that he is well. He is AOx3. He states that he lives alone, has a cane at bedside if he needs to use it, and that he can take care of himself. He denies any fever, chills, nausea, vomiting, photophobia, neck stiffness, cough, chest pain, diarrhea or syncope.        ______________________________________________________________________  DISCHARGE SUMMARY/HOSPITAL COURSE:  for full details see H&P, daily progress notes, labs, consult notes. Pt was admitted to the hospital and was treated for following medical problems as mentioned below. Encephalopathy with one episode of fever resolved   Pituitary macroadenoma   B/L chronic occipital lobe  Infarctions     -Fever of 101.4F only one episode on arrival but now resolved  - UA neg, WBC normal, CXR neg, CT Abdo/Pelvis w/ contrast negative for acute abnormality. No meningeal signs, no headache, no photophobia, AOx3. Flu negative as well. Blood cx NTD.  -CT brain ordered yesterday showed pituitary mass and also  occipital infarctions.   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 -c/w asa and statin for previous occipital CVA. -pt is being sent to rehab facility due to deconditioning for strength building. I d/w son the plan of care and he verbalized understanding the need for additional out pt f/u. HTN  - lisinopril increased to 40 mg due to uncontrolled HTN and metoprolol was resumed    DM - 2  on glimepiride     Procedures     MRI examination of the brain  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 Possible left cavernous sinus  invasion. 2. Chronic white matter disease and ischemic changes. No acute infarction            _______________________________________________________________________  Patient seen and examined by me on discharge day. Pertinent Findings:  Gen:    Not in distress  Chest: Clear lungs  CVS:   Regular rhythm. No edema  Abd:  Soft, not distended, not tender  Neuro:  Alert, awake   _______________________________________________________________________  DISCHARGE MEDICATIONS:   Current Discharge Medication List      CONTINUE these medications which have CHANGED    Details   aspirin 81 mg chewable tablet Take 1 Tab by mouth daily for 30 days. Qty: 30 Tab, Refills: 1      lisinopril (PRINIVIL, ZESTRIL) 40 mg tablet Take 1 Tab by mouth daily for 30 days. Qty: 30 Tab, Refills: 1      therapeutic multivitamin (THERA) tablet Take 1 Tab by mouth daily for 30 days. Qty: 30 Tab, Refills: 1         CONTINUE these medications which have NOT CHANGED    Details   metoprolol tartrate (LOPRESSOR) 50 mg tablet Take 50 mg by mouth two (2) times a day. glimepiride (AMARYL) 2 mg tablet Take 2 mg by mouth every morning.       diphenhydrAMINE (BENADRYL) 25 mg capsule Take 25 mg by mouth every six (6) hours as needed for Sleep.      acetaminophen (TYLENOL) 325 mg tablet Take 2 Tabs by mouth every four (4) hours as needed. Qty: 1 Tab, Refills: 0      pantoprazole (PROTONIX) 40 mg tablet Take 1 Tab by mouth daily. Qty: 30 Tab, Refills: 0      simvastatin (ZOCOR) 40 mg tablet Take 40 mg by mouth nightly. STOP taking these medications       aspirin delayed-release 81 mg tablet Comments:   Reason for Stopping:               My Recommended Diet, Activity, Wound Care, and follow-up labs are listed in the patient's Discharge Insturctions which I have personally completed and reviewed.     _______________________________________________________________________  DISPOSITION:    Home with Family:    Home with HH/PT/OT/RN:    SNF/LTC:    LYLA: y   OTHER:        Condition at Discharge:  Stable  _______________________________________________________________________  Follow up with:   PCP : Marygrace Goodpasture, MD  Follow-up Information     Follow up With Details Comments Contact Info    Marygrace Goodpasture, MD Schedule an appointment as soon as possible for a visit in 1 week PCP [address] [place] [address]  [phone]       Schedule an appointment as soon as possible for a visit in 1 week      Codey Schroeder MD Schedule an appointment as soon as possible for a visit in 1 week  [address] [facility]      Laurie Hodges MD Schedule an appointment as soon as possible for a visit in 1 week  [address]  [address] [phone]                Total time in minutes spent coordinating this discharge (includes going over instructions, follow-up, prescriptions, and preparing report for sign off to her PCP) :  35  minutes    Signed:  Ky Kennedy MD

## 2017-09-24 NOTE — PROGRESS NOTES
Oncology Nursing Communication Tool      7:45 AM  9/24/2017     Bedside shift change report given to Adia RN (incoming nurse) by Romayne Graver (outgoing nurse) on Kathleen Mac a 76 y.o. male who was admitted on 9/18/2017  8:24 PM. Report included the following information SBAR, Kardex, Intake/Output, MAR and Recent Results. Significant changes during shift: none      Issues for physician to address: none            Code Status: Full Code     Infections: VRE     Allergies: Review of patient's allergies indicates no known allergies. Current diet: DIET CARDIAC Regular       Pain Controlled [x] yes [] no   Bowel Movement [x] yes [] no   Last Bowel Movement (date)     9/23/17            Vital Signs:   Patient Vitals for the past 12 hrs:   Temp Pulse Resp BP SpO2   09/24/17 0720 97.9 °F (36.6 °C) 71 16 (!) 165/95 98 %   09/24/17 0010 97.8 °F (36.6 °C) 66 16 (!) 151/94 97 %      Intake & Output:     Intake/Output Summary (Last 24 hours) at 09/24/17 0745  Last data filed at 09/24/17 0204   Gross per 24 hour   Intake              480 ml   Output              600 ml   Net             -120 ml      Laboratory Results:     Recent Results (from the past 12 hour(s))   GLUCOSE, POC    Collection Time: 09/23/17 10:42 PM   Result Value Ref Range    Glucose (POC) 261 (H) 65 - 100 mg/dL    Performed by Debra Mustafa    GLUCOSE, POC    Collection Time: 09/24/17  7:25 AM   Result Value Ref Range    Glucose (POC) 167 (H) 65 - 100 mg/dL    Performed by Benjy Allen               Opportunity for questions and clarifications were given to the incoming nurse. Patient's bed is in low position, side rails x2, door open PRN, call bell within reach and patient not in distress.       Romayne Graver

## 2017-09-24 NOTE — PROGRESS NOTES
Patient being discharged to [facility]. Report given to [facility] at [phone]. AVS was faxed to facility. Copy of AVS and MAR report sent with patient. Yellow cab was called for patient transport. Opportunity for additional questions and clarification was provided.

## 2017-09-25 LAB — PROLACTIN SERPL-MCNC: 14.5 NG/ML

## 2017-09-25 NOTE — CDMP QUERY
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 Please include clinical findings supporting your diagnosis.     Thank Sneha Ko

## 2017-09-27 ENCOUNTER — PATIENT OUTREACH (OUTPATIENT)
Dept: CASE MANAGEMENT | Age: 77
End: 2017-09-27

## 2017-09-27 NOTE — PROGRESS NOTES
Community Care Team Documentation for Patient in [facility]  Initial Follow Up       Patient was admitted to [facility] from 9/19 to 9/24. Patient was discharged to [facility], [facility], on 9/24 (date). See previous [facility] Care Team documentation under Patient Outreach. Hospital Discharge diagnosis:  Encephalopathy    RRAT score:  24    Advance Medical Directive on file in EMR? Not on File    Total Hospitalizations/ED visits last 6 months? IP - 1; ED - 0    PCP : Chemo You MD    Per Carmen Messina at [facility], Reviewed [place] [facility] questions with SNF to ensure patient arrived with admission packet in order. Open to PT/OT. Currently independent with bed mobility and transfers. Supervision with ADLs and ambulating 150ft with a cane. SNF working on scheduling needed FU appointments. Therapy recommending 3 week LOS. Plan for pt to return home alone. SNF Attending: Luke Miller MD    Medications were not reconciled and general patient assessment was not completed during this skilled nursing facility outreach.      LOLITA EvansW

## 2017-10-05 ENCOUNTER — PATIENT OUTREACH (OUTPATIENT)
Dept: CASE MANAGEMENT | Age: 77
End: 2017-10-05

## 2017-10-05 NOTE — PROGRESS NOTES
Community Care Team Documentation for Patient in [facility]  Subsequent Follow up     Patient remains at [facility] ([facility]). See previous [facility] Team notes. PCP : Dorinda Gonzales MD    Per Hill Rodríguez at SNF, PT: I  bed mobility, trans, Mod I with cane. OT: Mod I all self care with cane. Appointments: 10/3 . DC: set for 10/18. Medications were not reconciled and general patient assessment was not completed during this skilled nursing facility outreach.      LOLITA VincentW

## 2017-10-11 ENCOUNTER — PATIENT OUTREACH (OUTPATIENT)
Dept: CASE MANAGEMENT | Age: 77
End: 2017-10-11

## 2017-10-11 NOTE — PROGRESS NOTES
Community Care Team Documentation for Patient in [facility]  Subsequent Follow up     Patient Discharged from [facility] ([facility]). See previous [facility] Team notes. PCP : Julia Rodrigues MD    Per Lakhwinder Brooks at [facility], Pt d/c home alone 10/9 with assistance through son in efforts to conserve skilled days. Pt declined HH. Pt anticipated SNF will be needed following surgery. Medications were not reconciled and general patient assessment was not completed during this skilled nursing facility outreach.      CELSA Rg

## 2017-12-01 ENCOUNTER — HOSPITAL ENCOUNTER (EMERGENCY)
Age: 77
Discharge: HOME | End: 2017-12-01
Attending: EMERGENCY MEDICINE
Payer: MEDICARE

## 2017-12-01 ENCOUNTER — APPOINTMENT (OUTPATIENT)
Dept: GENERAL RADIOLOGY | Age: 77
End: 2017-12-01
Attending: EMERGENCY MEDICINE
Payer: MEDICARE

## 2017-12-01 VITALS
HEART RATE: 75 BPM | WEIGHT: 188 LBS | TEMPERATURE: 98 F | DIASTOLIC BLOOD PRESSURE: 72 MMHG | OXYGEN SATURATION: 98 % | SYSTOLIC BLOOD PRESSURE: 113 MMHG | RESPIRATION RATE: 15 BRPM | BODY MASS INDEX: 26.92 KG/M2 | HEIGHT: 70 IN

## 2017-12-01 DIAGNOSIS — R10.84 ABDOMINAL PAIN, GENERALIZED: ICD-10-CM

## 2017-12-01 DIAGNOSIS — R11.0 NAUSEA WITHOUT VOMITING: Primary | ICD-10-CM

## 2017-12-01 DIAGNOSIS — E83.42 HYPOMAGNESEMIA: ICD-10-CM

## 2017-12-01 LAB
ALBUMIN SERPL-MCNC: 3.3 G/DL (ref 3.5–5)
ALBUMIN/GLOB SERPL: 0.8 {RATIO} (ref 1.1–2.2)
ALP SERPL-CCNC: 86 U/L (ref 45–117)
ALT SERPL-CCNC: 29 U/L (ref 12–78)
ANION GAP SERPL CALC-SCNC: 8 MMOL/L (ref 5–15)
APPEARANCE UR: CLEAR
AST SERPL-CCNC: 23 U/L (ref 15–37)
BACTERIA URNS QL MICRO: NEGATIVE /HPF
BASOPHILS # BLD: 0 K/UL (ref 0–0.1)
BASOPHILS NFR BLD: 0 % (ref 0–1)
BILIRUB SERPL-MCNC: 0.4 MG/DL (ref 0.2–1)
BILIRUB UR QL: NEGATIVE
BUN SERPL-MCNC: 19 MG/DL (ref 6–20)
BUN/CREAT SERPL: 19 (ref 12–20)
CALCIUM SERPL-MCNC: 8.2 MG/DL (ref 8.5–10.1)
CHLORIDE SERPL-SCNC: 104 MMOL/L (ref 97–108)
CO2 SERPL-SCNC: 23 MMOL/L (ref 21–32)
COLOR UR: ABNORMAL
CREAT SERPL-MCNC: 1.02 MG/DL (ref 0.7–1.3)
EOSINOPHIL # BLD: 0.1 K/UL (ref 0–0.4)
EOSINOPHIL NFR BLD: 1 % (ref 0–7)
EPITH CASTS URNS QL MICRO: ABNORMAL /LPF
ERYTHROCYTE [DISTWIDTH] IN BLOOD BY AUTOMATED COUNT: 13.1 % (ref 11.5–14.5)
GLOBULIN SER CALC-MCNC: 4.3 G/DL (ref 2–4)
GLUCOSE SERPL-MCNC: 145 MG/DL (ref 65–100)
GLUCOSE UR STRIP.AUTO-MCNC: NEGATIVE MG/DL
HCT VFR BLD AUTO: 41.7 % (ref 36.6–50.3)
HGB BLD-MCNC: 13.9 G/DL (ref 12.1–17)
HGB UR QL STRIP: NEGATIVE
HYALINE CASTS URNS QL MICRO: ABNORMAL /LPF (ref 0–5)
KETONES UR QL STRIP.AUTO: NEGATIVE MG/DL
LACTATE SERPL-SCNC: 1 MMOL/L (ref 0.4–2)
LEUKOCYTE ESTERASE UR QL STRIP.AUTO: NEGATIVE
LIPASE SERPL-CCNC: 115 U/L (ref 73–393)
LYMPHOCYTES # BLD: 0.9 K/UL (ref 0.8–3.5)
LYMPHOCYTES NFR BLD: 12 % (ref 12–49)
MAGNESIUM SERPL-MCNC: 0.4 MG/DL (ref 1.6–2.4)
MCH RBC QN AUTO: 30 PG (ref 26–34)
MCHC RBC AUTO-ENTMCNC: 33.3 G/DL (ref 30–36.5)
MCV RBC AUTO: 90.1 FL (ref 80–99)
MONOCYTES # BLD: 0.3 K/UL (ref 0–1)
MONOCYTES NFR BLD: 5 % (ref 5–13)
NEUTS SEG # BLD: 5.7 K/UL (ref 1.8–8)
NEUTS SEG NFR BLD: 82 % (ref 32–75)
NITRITE UR QL STRIP.AUTO: NEGATIVE
PH UR STRIP: 6 [PH] (ref 5–8)
PLATELET # BLD AUTO: 298 K/UL (ref 150–400)
POTASSIUM SERPL-SCNC: 4 MMOL/L (ref 3.5–5.1)
PROT SERPL-MCNC: 7.6 G/DL (ref 6.4–8.2)
PROT UR STRIP-MCNC: 100 MG/DL
RBC # BLD AUTO: 4.63 M/UL (ref 4.1–5.7)
RBC #/AREA URNS HPF: ABNORMAL /HPF (ref 0–5)
SODIUM SERPL-SCNC: 135 MMOL/L (ref 136–145)
SP GR UR REFRACTOMETRY: 1.02 (ref 1–1.03)
TROPONIN I SERPL-MCNC: <0.04 NG/ML
UA: UC IF INDICATED,UAUC: ABNORMAL
UROBILINOGEN UR QL STRIP.AUTO: 0.2 EU/DL (ref 0.2–1)
WBC # BLD AUTO: 7 K/UL (ref 4.1–11.1)
WBC URNS QL MICRO: ABNORMAL /HPF (ref 0–4)

## 2017-12-01 PROCEDURE — 80053 COMPREHEN METABOLIC PANEL: CPT | Performed by: EMERGENCY MEDICINE

## 2017-12-01 PROCEDURE — 84484 ASSAY OF TROPONIN QUANT: CPT | Performed by: EMERGENCY MEDICINE

## 2017-12-01 PROCEDURE — 96361 HYDRATE IV INFUSION ADD-ON: CPT

## 2017-12-01 PROCEDURE — 36415 COLL VENOUS BLD VENIPUNCTURE: CPT | Performed by: EMERGENCY MEDICINE

## 2017-12-01 PROCEDURE — 99284 EMERGENCY DEPT VISIT MOD MDM: CPT

## 2017-12-01 PROCEDURE — 93005 ELECTROCARDIOGRAM TRACING: CPT

## 2017-12-01 PROCEDURE — 83605 ASSAY OF LACTIC ACID: CPT | Performed by: EMERGENCY MEDICINE

## 2017-12-01 PROCEDURE — 81001 URINALYSIS AUTO W/SCOPE: CPT | Performed by: EMERGENCY MEDICINE

## 2017-12-01 PROCEDURE — 96366 THER/PROPH/DIAG IV INF ADDON: CPT

## 2017-12-01 PROCEDURE — 96365 THER/PROPH/DIAG IV INF INIT: CPT

## 2017-12-01 PROCEDURE — 96375 TX/PRO/DX INJ NEW DRUG ADDON: CPT

## 2017-12-01 PROCEDURE — 83735 ASSAY OF MAGNESIUM: CPT | Performed by: EMERGENCY MEDICINE

## 2017-12-01 PROCEDURE — 74022 RADEX COMPL AQT ABD SERIES: CPT

## 2017-12-01 PROCEDURE — 74011250636 HC RX REV CODE- 250/636: Performed by: EMERGENCY MEDICINE

## 2017-12-01 PROCEDURE — 83690 ASSAY OF LIPASE: CPT | Performed by: EMERGENCY MEDICINE

## 2017-12-01 PROCEDURE — 85025 COMPLETE CBC W/AUTO DIFF WBC: CPT | Performed by: EMERGENCY MEDICINE

## 2017-12-01 RX ORDER — MAGNESIUM SULFATE HEPTAHYDRATE 40 MG/ML
2 INJECTION, SOLUTION INTRAVENOUS
Status: COMPLETED | OUTPATIENT
Start: 2017-12-01 | End: 2017-12-01

## 2017-12-01 RX ORDER — ONDANSETRON 4 MG/1
4 TABLET, ORALLY DISINTEGRATING ORAL
Qty: 10 TAB | Refills: 0 | Status: SHIPPED | OUTPATIENT
Start: 2017-12-01 | End: 2018-03-03

## 2017-12-01 RX ORDER — ONDANSETRON 2 MG/ML
4 INJECTION INTRAMUSCULAR; INTRAVENOUS
Status: COMPLETED | OUTPATIENT
Start: 2017-12-01 | End: 2017-12-01

## 2017-12-01 RX ADMIN — ONDANSETRON 4 MG: 2 INJECTION INTRAMUSCULAR; INTRAVENOUS at 15:30

## 2017-12-01 RX ADMIN — MAGNESIUM SULFATE HEPTAHYDRATE 2 G: 40 INJECTION, SOLUTION INTRAVENOUS at 16:55

## 2017-12-01 RX ADMIN — SODIUM CHLORIDE 500 ML: 900 INJECTION, SOLUTION INTRAVENOUS at 15:30

## 2017-12-01 NOTE — DISCHARGE INSTRUCTIONS
Abdominal Pain: Care Instructions  Your Care Instructions    Abdominal pain has many possible causes. Some aren't serious and get better on their own in a few days. Others need more testing and treatment. If your pain continues or gets worse, you need to be rechecked and may need more tests to find out what is wrong. You may need surgery to correct the problem. Don't ignore new symptoms, such as fever, nausea and vomiting, urination problems, pain that gets worse, and dizziness. These may be signs of a more serious problem. Your doctor may have recommended a follow-up visit in the next 8 to 12 hours. If you are not getting better, you may need more tests or treatment. The doctor has checked you carefully, but problems can develop later. If you notice any problems or new symptoms, get medical treatment right away. Follow-up care is a key part of your treatment and safety. Be sure to make and go to all appointments, and call your doctor if you are having problems. It's also a good idea to know your test results and keep a list of the medicines you take. How can you care for yourself at home? · Rest until you feel better. · To prevent dehydration, drink plenty of fluids, enough so that your urine is light yellow or clear like water. Choose water and other caffeine-free clear liquids until you feel better. If you have kidney, heart, or liver disease and have to limit fluids, talk with your doctor before you increase the amount of fluids you drink. · If your stomach is upset, eat mild foods, such as rice, dry toast or crackers, bananas, and applesauce. Try eating several small meals instead of two or three large ones. · Wait until 48 hours after all symptoms have gone away before you have spicy foods, alcohol, and drinks that contain caffeine. · Do not eat foods that are high in fat. · Avoid anti-inflammatory medicines such as aspirin, ibuprofen (Advil, Motrin), and naproxen (Aleve).  These can cause stomach upset. Talk to your doctor if you take daily aspirin for another health problem. When should you call for help? Call 911 anytime you think you may need emergency care. For example, call if:  ? · You passed out (lost consciousness). ? · You pass maroon or very bloody stools. ? · You vomit blood or what looks like coffee grounds. ? · You have new, severe belly pain. ?Call your doctor now or seek immediate medical care if:  ? · Your pain gets worse, especially if it becomes focused in one area of your belly. ? · You have a new or higher fever. ? · Your stools are black and look like tar, or they have streaks of blood. ? · You have unexpected vaginal bleeding. ? · You have symptoms of a urinary tract infection. These may include:  ¨ Pain when you urinate. ¨ Urinating more often than usual.  ¨ Blood in your urine. ? · You are dizzy or lightheaded, or you feel like you may faint. ? Watch closely for changes in your health, and be sure to contact your doctor if:  ? · You are not getting better after 1 day (24 hours). Where can you learn more? Go to http://salud-susana.info/. Enter S383 in the search box to learn more about \"Abdominal Pain: Care Instructions. \"  Current as of: March 20, 2017  Content Version: 11.4  © 1827-3698 [employer]. Care instructions adapted under license by [employer] (which disclaims liability or warranty for this information). If you have questions about a medical condition or this instruction, always ask your healthcare professional. [place] [address] any warranty or liability for your use of this information. Nausea and Vomiting: Care Instructions  Your Care Instructions    When you are nauseated, you may feel weak and sweaty and notice a lot of saliva in your mouth. Nausea often leads to vomiting.  Most of the time you do not need to worry about nausea and vomiting, but they can be signs of other illnesses. Two common causes of nausea and vomiting are stomach flu and food poisoning. Nausea and vomiting from viral stomach flu will usually start to improve within 24 hours. Nausea and vomiting from food poisoning may last from 12 to 48 hours. The doctor has checked you carefully, but problems can develop later. If you notice any problems or new symptoms, get medical treatment right away. Follow-up care is a key part of your treatment and safety. Be sure to make and go to all appointments, and call your doctor if you are having problems. It's also a good idea to know your test results and keep a list of the medicines you take. How can you care for yourself at home? · To prevent dehydration, drink plenty of fluids, enough so that your urine is light yellow or clear like water. Choose water and other caffeine-free clear liquids until you feel better. If you have kidney, heart, or liver disease and have to limit fluids, talk with your doctor before you increase the amount of fluids you drink. · Rest in bed until you feel better. · When you are able to eat, try clear soups, mild foods, and liquids until all symptoms are gone for 12 to 48 hours. Other good choices include dry toast, crackers, cooked cereal, and gelatin dessert, such as Jell-O. When should you call for help? Call 911 anytime you think you may need emergency care. For example, call if:  ? · You passed out (lost consciousness). ?Call your doctor now or seek immediate medical care if:  ? · You have symptoms of dehydration, such as:  ¨ Dry eyes and a dry mouth. ¨ Passing only a little dark urine. ¨ Feeling thirstier than usual.   ? · You have new or worsening belly pain. ? · You have a new or higher fever. ? · You vomit blood or what looks like coffee grounds. ? Watch closely for changes in your health, and be sure to contact your doctor if:  ? · You have ongoing nausea and vomiting. ? · Your vomiting is getting worse.    ? · Your vomiting lasts longer than 2 days. ? · You are not getting better as expected. Where can you learn more? Go to http://salud-susana.info/. Enter 25 [id number] in the search box to learn more about \"Nausea and Vomiting: Care Instructions. \"  Current as of: March 20, 2017  Content Version: 11.4  © 2180-5099 [employer]. Care instructions adapted under license by [employer] (which disclaims liability or warranty for this information). If you have questions about a medical condition or this instruction, always ask your healthcare professional. [place] [address] any warranty or liability for your use of this information.

## 2017-12-01 NOTE — ED PROVIDER NOTES
EMERGENCY DEPARTMENT HISTORY AND PHYSICAL EXAM      Date: 12/1/2017  Patient Name: Doug Linn    History of Presenting Illness     Chief Complaint   Patient presents with    Nausea    Abdominal Pain       History Provided By: Patient    HPI: Doug Linn, 76 y.o. male with PMHx significant for HTN, DM, CKD, aneurysm, arrhythmia, and CA, presents via EMS to the ED [facility] ED with cc of nausea and abdominal pain starting last night. Pt reports feeling the urge to vomit, but has only voided a small amount of clear liquid. He describes the abdominal pain as a diffuse soreness. He reports associated mild abdominal distension and diarrhea starting yesterday, with last BM this morning. Pt states he has hx of CA to his abdominal region, but is unsure of the specific location, and he had a CA surgery to his abdomen. He states he is not undergoing any CA treatment at this time, but is due to see his oncologist soon. Pt specifically denies dysuria, hematuria, other urinary symptoms, CP, hematochezia, melena, and hematemesis. Social Hx: - Tobacco (former), - EtOH (former), - Illicit Drugs      PCP: Meera Cerna MD    There are no other complaints, changes, or physical findings at this time. Current Outpatient Prescriptions   Medication Sig Dispense Refill    ondansetron (ZOFRAN ODT) 4 mg disintegrating tablet Take 1 Tab by mouth every eight (8) hours as needed for Nausea. 10 Tab 0    metoprolol tartrate (LOPRESSOR) 50 mg tablet Take 50 mg by mouth two (2) times a day.  glimepiride (AMARYL) 2 mg tablet Take 2 mg by mouth every morning.  diphenhydrAMINE (BENADRYL) 25 mg capsule Take 25 mg by mouth every six (6) hours as needed for Sleep.  simvastatin (ZOCOR) 40 mg tablet Take 40 mg by mouth nightly.  acetaminophen (TYLENOL) 325 mg tablet Take 2 Tabs by mouth every four (4) hours as needed. 1 Tab 0    pantoprazole (PROTONIX) 40 mg tablet Take 1 Tab by mouth daily.  30 Tab 0       Past History Past Medical History:  Past Medical History:   Diagnosis Date    Aneurysm ([facility] [address]     aortic    Arrhythmia     A-fib    Calculus of kidney     Cancer ([facility] [address]     Chronic kidney disease     stone    Diabetes ([facility] [address]     Fast heart beat     Former tobacco use     Hypertension     Muscle weakness     Prostate disorder     Stomach pain        Past Surgical History:  Past Surgical History:   Procedure Laterality Date    ABDOMEN SURGERY PROC UNLISTED      Whipple Procedure    HX ORTHOPAEDIC      neck surgery    HX OTHER SURGICAL  1/6/2015    Whipple procedure, G tube, J tube for duodenal cancer    UPPER GI ENDOSCOPY,BALL DIL,30MM  12/2/2014         UPPER GI ENDOSCOPY,BIOPSY  11/26/2014         UPPER GI ENDOSCOPY,BIOPSY  12/2/2014         UPPER GI ENDOSCOPY,DIAGNOSIS  1/23/2015            Family History:  Family History   Problem Relation Age of Onset    Cancer Mother     Heart Disease Mother     Stroke Father     Cancer Brother     Diabetes Brother     Heart Disease Brother        Social History:  Social History   Substance Use Topics    Smoking status: Former Smoker     Packs/day: 0.00     Years: 60.00    Smokeless tobacco: Never Used      Comment: pipe and cigar use currently    Alcohol use No      Comment: quit -14       Allergies:  No Known Allergies      Review of Systems   Review of Systems   Constitutional: Negative for chills, fatigue and fever. HENT: Negative for congestion, rhinorrhea and sore throat. Eyes: Negative for pain, discharge and visual disturbance. Respiratory: Negative for cough, chest tightness, shortness of breath and wheezing. Cardiovascular: Negative for chest pain, palpitations and leg swelling. Gastrointestinal: Positive for abdominal distention (mild), abdominal pain (diffuse soreness), diarrhea, nausea and vomiting (clear liquid). Negative for blood in stool and constipation. Genitourinary: Negative for dysuria, frequency and hematuria. Musculoskeletal: Negative for arthralgias, back pain and myalgias. Skin: Negative for rash. Neurological: Negative for dizziness, weakness, light-headedness and headaches. Psychiatric/Behavioral: Negative. Physical Exam   Physical Exam   Constitutional: He is oriented to person, place, and time. He appears well-developed and well-nourished. No distress. HENT:   Head: Normocephalic and atraumatic. Mouth/Throat: Mucous membranes are dry. Eyes: EOM are normal. Right eye exhibits no discharge. Left eye exhibits no discharge. No scleral icterus. Neck: Normal range of motion. Neck supple. No tracheal deviation present. Cardiovascular: Normal rate, regular rhythm, normal heart sounds and intact distal pulses. Exam reveals no gallop and no friction rub. No murmur heard. Pulmonary/Chest: Effort normal and breath sounds normal. No respiratory distress. He has no wheezes. He has no rales. Abdominal: Soft. He exhibits no distension. There is tenderness (diffuse, mild). There is no rebound and no guarding. Musculoskeletal: Normal range of motion. He exhibits no edema. Lymphadenopathy:     He has no cervical adenopathy. Neurological: He is alert and oriented to person, place, and time. Skin: Skin is warm and dry. No rash noted. Multiple, well-healed incision to abdomen. Psychiatric: He has a normal mood and affect. Nursing note and vitals reviewed.         Diagnostic Study Results     Labs -     Recent Results (from the past 12 hour(s))   CBC WITH AUTOMATED DIFF    Collection Time: 12/01/17  2:09 PM   Result Value Ref Range    WBC 7.0 4.1 - 11.1 K/uL    RBC 4.63 4.10 - 5.70 M/uL    HGB 13.9 12.1 - 17.0 g/dL    HCT 41.7 36.6 - 50.3 %    MCV 90.1 80.0 - 99.0 FL    MCH 30.0 26.0 - 34.0 PG    MCHC 33.3 30.0 - 36.5 g/dL    RDW 13.1 11.5 - 14.5 %    PLATELET 277 335 - 886 K/uL    NEUTROPHILS 82 (H) 32 - 75 %    LYMPHOCYTES 12 12 - 49 %    MONOCYTES 5 5 - 13 %    EOSINOPHILS 1 0 - 7 % BASOPHILS 0 0 - 1 %    ABS. NEUTROPHILS 5.7 1.8 - 8.0 K/UL    ABS. LYMPHOCYTES 0.9 0.8 - 3.5 K/UL    ABS. MONOCYTES 0.3 0.0 - 1.0 K/UL    ABS. EOSINOPHILS 0.1 0.0 - 0.4 K/UL    ABS. BASOPHILS 0.0 0.0 - 0.1 K/UL   METABOLIC PANEL, COMPREHENSIVE    Collection Time: 12/01/17  2:09 PM   Result Value Ref Range    Sodium 135 (L) 136 - 145 mmol/L    Potassium 4.0 3.5 - 5.1 mmol/L    Chloride 104 97 - 108 mmol/L    CO2 23 21 - 32 mmol/L    Anion gap 8 5 - 15 mmol/L    Glucose 145 (H) 65 - 100 mg/dL    BUN 19 6 - 20 MG/DL    Creatinine 1.02 0.70 - 1.30 MG/DL    BUN/Creatinine ratio 19 12 - 20      GFR est AA >60 >60 ml/min/1.73m2    GFR est non-AA >60 >60 ml/min/1.73m2    Calcium 8.2 (L) 8.5 - 10.1 MG/DL    Bilirubin, total 0.4 0.2 - 1.0 MG/DL    ALT (SGPT) 29 12 - 78 U/L    AST (SGOT) 23 15 - 37 U/L    Alk.  phosphatase 86 45 - 117 U/L    Protein, total 7.6 6.4 - 8.2 g/dL    Albumin 3.3 (L) 3.5 - 5.0 g/dL    Globulin 4.3 (H) 2.0 - 4.0 g/dL    A-G Ratio 0.8 (L) 1.1 - 2.2     EKG, 12 LEAD, INITIAL    Collection Time: 12/01/17  2:53 PM   Result Value Ref Range    Ventricular Rate 79 BPM    Atrial Rate 79 BPM    P-R Interval 238 ms    QRS Duration 86 ms    Q-T Interval 428 ms    QTC Calculation (Bezet) 490 ms    Calculated P Axis 86 degrees    Calculated R Axis 61 degrees    Calculated T Axis 69 degrees    Diagnosis       Sinus rhythm with 1st degree AV block  Prolonged QT  Abnormal ECG  When compared with ECG of 18-SEP-2017 21:35,  MI interval has increased     LIPASE    Collection Time: 12/01/17  3:36 PM   Result Value Ref Range    Lipase 115 73 - 393 U/L   LACTIC ACID    Collection Time: 12/01/17  3:36 PM   Result Value Ref Range    Lactic acid 1.0 0.4 - 2.0 MMOL/L   MAGNESIUM    Collection Time: 12/01/17  3:36 PM   Result Value Ref Range    Magnesium 0.4 (LL) 1.6 - 2.4 mg/dL   TROPONIN I    Collection Time: 12/01/17  3:36 PM   Result Value Ref Range    Troponin-I, Qt. <0.04 <0.05 ng/mL   URINALYSIS W/ REFLEX CULTURE Collection Time: 12/01/17  4:56 PM   Result Value Ref Range    Color YELLOW/STRAW      Appearance CLEAR CLEAR      Specific gravity 1.019 1.003 - 1.030      pH (UA) 6.0 5.0 - 8.0      Protein 100 (A) NEG mg/dL    Glucose NEGATIVE  NEG mg/dL    Ketone NEGATIVE  NEG mg/dL    Bilirubin NEGATIVE  NEG      Blood NEGATIVE  NEG      Urobilinogen 0.2 0.2 - 1.0 EU/dL    Nitrites NEGATIVE  NEG      Leukocyte Esterase NEGATIVE  NEG      WBC 0-4 0 - 4 /hpf    RBC 0-5 0 - 5 /hpf    Epithelial cells FEW FEW /lpf    Bacteria NEGATIVE  NEG /hpf    UA:UC IF INDICATED CULTURE NOT INDICATED BY UA RESULT CNI      Hyaline cast 0-2 0 - 5 /lpf       Radiologic Studies -   CXR Results  (Last 48 hours)               12/01/17 1745  XR ABD ACUTE W 1 V CHEST Final result    Impression:  IMPRESSION:  No acute disease. Narrative:  EXAM: Supine and upright views of the abdomen and a frontal view of the chest   are provided. INDICATION:  diffuse ab pain with nausea x last night       The bowel gas pattern is nonobstructive and unremarkable. There is no free   intraperitoneal air. There is no evident organomegaly or significant   intraabdominal calcification. The lungs are free of acute disease and significant findings. Heart size is   normal and there is no overt pulmonary edema. Medical Decision Making   I am the first provider for this patient. I reviewed the vital signs, available nursing notes, past medical history, past surgical history, family history and social history. Vital Signs-Reviewed the patient's vital signs.   Patient Vitals for the past 12 hrs:   Temp Pulse Resp BP SpO2   12/01/17 1715 - 75 15 113/72 98 %   12/01/17 1703 - 76 17 116/57 97 %   12/01/17 1655 - 76 - (!) 154/103 -   12/01/17 1342 98 °F (36.7 °C) 85 12 (!) 154/103 99 %       Pulse Oximetry Analysis - 94% on RA    Cardiac Monitor:   Rate: 80 bpm      EKG interpretation: (Preliminary) 14:53  Rhythm: normal sinus rhythm; and regular . Rate (approx.): 73; Axis: normal; RI interval: prolonged; QRS interval: normal ; ST/T wave: normal; 1st degree AV block. Records Reviewed: Nursing Notes and Old Medical Records    Provider Notes (Medical Decision Making):   Patient presents to ED with nausea and generalized abdominal pain. Vital signs are stable. Differential includes viral syndrome, dehydration, electrolyte abnormality, UTI, gastritis, gastroenteritis, obstruction, ileus, perforation, cholecystitis, pancreatitis, ischemia, atypical ACS. Of note, patient had recent CT a/p in September that was negative for acute intraabdominal pathology with AAA 3.1 cm in size. Doubt AAA is contributing to symptoms today. - CBC, CMP, lipase, lactate, troponin, UA  - AAS  - Symptomatic management and reevaluate    ED Course:   Initial assessment performed. The patients presenting problems have been discussed, and they are in agreement with the care plan formulated and outlined with them. I have encouraged them to ask questions as they arise throughout their visit. PROGRESS NOTE:  5:11 PM  Discussed with XR; pt keeps refusing XR because he keeps having to use the bathroom. They will attempt again. PROGRESS NOTE:  7:01 PM  Pt is feeling better, his nausea and abdominal pain have resolved. Abdominal exam is benign. Pt is tolerating PO. Labs unremarkable with exception of hypomagnesemia which was repleted. Will discharge with close follow up. Patient is comfortable with plan. Discussed results, prescriptions and follow up plan with patient. Provided customary return to ED instructions. Patient expressed understanding. Trinidad Mora MD    Disposition:  DISCHARGE NOTE  7:05 PM  The patient has been re-evaluated and is ready for discharge. Reviewed available results with patient. Counseled patient on diagnosis and care plan. Patient has expressed understanding, and all questions have been answered.  Patient agrees with plan and agrees to follow up as recommended, or return to the ED if their symptoms worsen. Discharge instructions have been provided and explained to the patient, along with reasons to return to the ED. PLAN:  1. Current Discharge Medication List      START taking these medications    Details   ondansetron (ZOFRAN ODT) 4 mg disintegrating tablet Take 1 Tab by mouth every eight (8) hours as needed for Nausea. Qty: 10 Tab, Refills: 0           2. Follow-up Information     Follow up With Details Comments Contact Info    Tiny Welch MD In 2 days  [address] [place] [address]  [phone]      [facility] EMERGENCY DEPT  As needed, If symptoms worsen [address]  [address] [facility]  [phone]        Return to ED if worse     Diagnosis     Clinical Impression:   1. Nausea without vomiting    2. Abdominal pain, generalized    3. Hypomagnesemia        Attestations: This note is prepared by Colby Tobar, acting as Scribe for Ambrocio Johnston MD.      The scribe's documentation has been prepared under my direction and personally reviewed by me in its entirety. I confirm that the note above accurately reflects all work, treatment, procedures, and medical decision making performed by me.   Ambrocio Johnston MD

## 2017-12-02 LAB
ATRIAL RATE: 79 BPM
CALCULATED P AXIS, ECG09: 86 DEGREES
CALCULATED R AXIS, ECG10: 61 DEGREES
CALCULATED T AXIS, ECG11: 69 DEGREES
DIAGNOSIS, 93000: NORMAL
P-R INTERVAL, ECG05: 238 MS
Q-T INTERVAL, ECG07: 428 MS
QRS DURATION, ECG06: 86 MS
QTC CALCULATION (BEZET), ECG08: 490 MS
VENTRICULAR RATE, ECG03: 79 BPM

## 2017-12-02 NOTE — ED NOTES
Pt verbalizes calling his son to come pick him up for discharge. Pt disconnected from monitors and is getting dressed.

## 2018-01-19 ENCOUNTER — HOSPITAL ENCOUNTER (OUTPATIENT)
Dept: CT IMAGING | Age: 78
Discharge: HOME | End: 2018-01-19
Attending: INTERNAL MEDICINE
Payer: MEDICARE

## 2018-01-19 DIAGNOSIS — C17.0 CARCINOMA OF DUODENUM (HCC): ICD-10-CM

## 2018-01-19 PROCEDURE — 74011000255 HC RX REV CODE- 255: Performed by: INTERNAL MEDICINE

## 2018-01-19 PROCEDURE — 74011636320 HC RX REV CODE- 636/320: Performed by: INTERNAL MEDICINE

## 2018-01-19 PROCEDURE — 74011250636 HC RX REV CODE- 250/636: Performed by: INTERNAL MEDICINE

## 2018-01-19 PROCEDURE — 74177 CT ABD & PELVIS W/CONTRAST: CPT

## 2018-01-19 RX ORDER — SODIUM CHLORIDE 0.9 % (FLUSH) 0.9 %
10 SYRINGE (ML) INJECTION
Status: COMPLETED | OUTPATIENT
Start: 2018-01-19 | End: 2018-01-19

## 2018-01-19 RX ORDER — BARIUM SULFATE 20 MG/ML
900 SUSPENSION ORAL
Status: COMPLETED | OUTPATIENT
Start: 2018-01-19 | End: 2018-01-19

## 2018-01-19 RX ORDER — SODIUM CHLORIDE 9 MG/ML
50 INJECTION, SOLUTION INTRAVENOUS
Status: COMPLETED | OUTPATIENT
Start: 2018-01-19 | End: 2018-01-19

## 2018-01-19 RX ADMIN — IOPAMIDOL 100 ML: 755 INJECTION, SOLUTION INTRAVENOUS at 14:36

## 2018-01-19 RX ADMIN — Medication 10 ML: at 14:36

## 2018-01-19 RX ADMIN — BARIUM SULFATE 900 ML: 21 SUSPENSION ORAL at 14:36

## 2018-01-19 RX ADMIN — SODIUM CHLORIDE 50 ML/HR: 900 INJECTION, SOLUTION INTRAVENOUS at 14:36

## 2018-02-12 ENCOUNTER — OFFICE VISIT (OUTPATIENT)
Dept: ONCOLOGY | Age: 78
End: 2018-02-12

## 2018-02-12 VITALS
OXYGEN SATURATION: 99 % | TEMPERATURE: 97.8 F | SYSTOLIC BLOOD PRESSURE: 141 MMHG | RESPIRATION RATE: 16 BRPM | HEART RATE: 75 BPM | HEIGHT: 70 IN | DIASTOLIC BLOOD PRESSURE: 84 MMHG | WEIGHT: 195.4 LBS | BODY MASS INDEX: 27.97 KG/M2

## 2018-02-12 DIAGNOSIS — C17.0 CARCINOMA OF DUODENUM (HCC): Primary | ICD-10-CM

## 2018-02-12 RX ORDER — CIPROFLOXACIN 500 MG/1
TABLET ORAL
COMMUNITY
Start: 2017-12-28 | End: 2018-03-03

## 2018-02-12 RX ORDER — NAPROXEN 500 MG/1
TABLET ORAL
COMMUNITY
Start: 2017-12-28 | End: 2018-06-13

## 2018-02-12 RX ORDER — LISINOPRIL 40 MG/1
TABLET ORAL
COMMUNITY
Start: 2018-01-12

## 2018-02-12 NOTE — PROGRESS NOTES
Follow up Note        Patient: August Oneil MRN: [id number]  SSN: [id number]    YOB: 1940  Age: 76 y.o. Sex: male        Diagnosis:     1. 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 Adjuvant single agent Capecitabine, single agent   S/P 1 cycles, therapy stopped due to severe diarrhea leading to hospitalization  2. Pancreato-duodenectomy on 01/06/2015    Subjective:      August Oneil is a 76 y.o. 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 He underwent a pancreato-duodenectomy on 01/06/2015. The tumor in the duodenum was found to be 2.5 cm in size and invaded the pancreatic head and 1/15 LN had disease. He stayed in the hospital until Feb 2015 recovering from the surgery. Mr. Abby Easton elected to receive adjuvant chemotherapy in the form of single agent Capecitabine. He suffered severe diarrhea and I had to discontinue therapy early in the course. Mr. Abby Easton is doing well and and denies N/V/D or weight loss.        Review of Systems:    Constitutional: fatigue  Eyes: negative  Ears, Nose, Mouth, Throat, and Face: negative  Respiratory: negative  Cardiovascular: negative  Gastrointestinal: negative  Integument: negative   Hematologic/Lymphatic: negative  Musculoskeletal:negative  Neurological: negative        Past Medical History:   Diagnosis Date    Aneurysm ([address]     aortic    Arrhythmia     A-fib    Calculus of kidney     Cancer ([address]     Chronic kidney disease     stone    Diabetes ([address]     Fast heart beat     Former tobacco use     Hypertension     Muscle weakness     Prostate disorder     Stomach pain      Past Surgical History:   Procedure Laterality Date    ABDOMEN SURGERY PROC UNLISTED      Whipple Procedure    HX ORTHOPAEDIC      neck surgery    HX OTHER SURGICAL  1/6/2015    Whipple procedure, G tube, J tube for duodenal cancer    UPPER GI ENDOSCOPY,BALL DIL,30MM  12/2/2014         UPPER GI ENDOSCOPY,BIOPSY  11/26/2014         UPPER GI ENDOSCOPY,BIOPSY  12/2/2014         UPPER GI ENDOSCOPY,DIAGNOSIS  1/23/2015           Family History   Problem Relation Age of Onset    Cancer Mother     Heart Disease Mother     Stroke Father     Cancer Brother     Diabetes Brother     Heart Disease Brother      Social History   Substance Use Topics    Smoking status: Former Smoker     Packs/day: 0.00     Years: 60.00    Smokeless tobacco: Never Used      Comment: pipe and cigar use currently    Alcohol use No      Comment: quit -14      Prior to Admission medications    Medication Sig Start Date End Date Taking? Authorizing Provider   metoprolol tartrate (LOPRESSOR) 50 mg tablet Take 50 mg by mouth two (2) times a day. Yes Historical Provider   glimepiride (AMARYL) 2 mg tablet Take 2 mg by mouth every morning. Yes Dominique Vences MD   diphenhydrAMINE (BENADRYL) 25 mg capsule Take 25 mg by mouth every six (6) hours as needed for Sleep. Yes Historical Provider   simvastatin (ZOCOR) 40 mg tablet Take 40 mg by mouth nightly. 3/30/15  Yes Dominique Vences MD   pantoprazole (PROTONIX) 40 mg tablet Take 1 Tab by mouth daily. 2/9/15  Yes Glendy Chen MD   lisinopril (PRINIVIL, ZESTRIL) 40 mg tablet  1/12/18   Historical Provider   naproxen (NAPROSYN) 500 mg tablet  12/28/17   Historical Provider   ciprofloxacin HCl (CIPRO) 500 mg tablet  12/28/17   Historical Provider   ondansetron (ZOFRAN ODT) 4 mg disintegrating tablet Take 1 Tab by mouth every eight (8) hours as needed for Nausea. 12/1/17   Yahir Rossi MD   acetaminophen (TYLENOL) 325 mg tablet Take 2 Tabs by mouth every four (4) hours as needed.  2/9/15   Glendy Chen MD          No Known Allergies        Objective:     Vitals:    02/12/18 1507   BP: 141/84   Pulse: 75   Resp: 16   Temp: 97.8 °F (36.6 °C)   TempSrc: Oral   SpO2: 99%   Weight: 195 lb 6.4 oz (88.6 kg)   Height: 5' 10\" (1.778 m)          Physical Exam:    GENERAL: alert, cooperative  EYE: negative  LYMPHATIC: Cervical, supraclavicular, and axillary nodes normal.   THROAT & NECK: normal and no erythema or exudates noted. LUNG: clear to auscultation bilaterally  HEART: regular rate and rhythm  ABDOMEN: soft, non-tender  EXTREMITIES: no cyanosis or edema  SKIN: Normal.  NEUROLOGIC: negative        CT Results (most recent):    Results from Hospital Encounter encounter on 01/19/18   CT ABD PELV W CONT   Narrative INDICATION: Restaging disease  duodenal neoplasm    COMPARISON: 9/18/2017 7/24/2017 and 11/15/2016    TECHNIQUE:   Following the uneventful intravenous administration of 100 cc Isovue-370, thin  axial images were obtained through the abdomen and pelvis. Coronal and sagittal  reconstructions were generated. Oral contrast was not administered. CT dose  reduction was achieved through use of a standardized protocol tailored for this  examination and automatic exposure control for dose modulation. FINDINGS:   LUNG BASES: Clear. LIVER: There is a tiny lucency measuring 4.4 mm in hepatic segment 4A/B (2-24)  which is unchanged when compared to previous study of November 2016. There is no  other focal abnormality within the liver. No biliary dilatation. Pneumatobilia  is noted in the left lobe. Portal vein and its branches basilar normally. GALLBLADDER: Absent  SPLEEN: No mass. PANCREAS: Evidence of previous Whipple surgery is again noted. Remainder of the  pancreas is unremarkable. ADRENALS: Unremarkable. KIDNEYS: Multiple lucencies in the cortex of both kidneys unchanged this  includes the nonwater density mass in the mid left kidney which measures  approximately 2 cm. There is no hydronephrosis. GI: No dilatation or wall thickening. Diverticulosis of the distal colon noted. APPENDIX: Unremarkable. PERITONEUM: No ascites or pneumoperitoneum.   RETROPERITONEUM: The focal fusiform aneurysm of the mid abdominal aorta is 3 cm  in diameter similar to the previous study. There is thrombus in the posterior  portion of this. Also noted are the retroperitoneal lymph nodes identified on  previous scans including November 2016. These have not changed in size or  configuration or number since previous exams. URINARY BLADDER: No mass or calculus. PELVIS: No adenopathy or abnormal mass. There is enlargement of the prostate  gland. BONES: No destructive bone lesion. ADDITIONAL COMMENTS: N/A         Impression IMPRESSION:    1. No significant change in the retroperitoneal lymph nodes which are normal in  size in the upper abdomen. These are stable since November 2016.  2. No new adenopathy or mass. 3. 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 Assessment:     1. 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 ECOG PS 0  Intent of therapy - curative    S/P pancreato-duodenectomy on 01/06/2015  Patient elected to pursue systemic adjuvant chemotherapy  Received 1 cycle of single agent Capecitabine   Therapy stopped due to severe toxicity  On observation alone  In remission    CT Abd Pelv (7/24/2017): In remission    Symptom management form reviewed with patient. Plan:        > Follow-up in 1 year        Signed by: Maya Caldera MD                     February 12, 2018        CC. Gavin Mortimer Verneita Evangelist, MD  CC.  Da Thao MD

## 2018-02-12 NOTE — PROGRESS NOTES
Florian Sanchez is a 76 y.o. male here today for Adenocarcinoma of the duodenum f/u. Pancreato-duodenectomy; 1/6/15. S/P 1 cycle of of adjuvant Capcetiabine stopped d/t severe diarrhea causing hospitalization. Patient noted ambulating with a cane. VS stable. Patient denies pain. Patient denies N/V/D and constipation. Patient states he has some numbness in his left thumb; pt stated he has had numbness for quite sometime; old injury. Patient denies falls. Blood pressure 141/84, pulse 75, temperature 97.8 °F (36.6 °C), temperature source Oral, resp. rate 16, height 5' 10\" (1.778 m), weight 195 lb 6.4 oz (88.6 kg), SpO2 99 %.     Health Maintenance reviewed

## 2018-03-03 ENCOUNTER — APPOINTMENT (OUTPATIENT)
Dept: CT IMAGING | Age: 78
End: 2018-03-03
Attending: EMERGENCY MEDICINE
Payer: MEDICARE

## 2018-03-03 ENCOUNTER — HOSPITAL ENCOUNTER (EMERGENCY)
Age: 78
Discharge: HOME | End: 2018-03-03
Attending: EMERGENCY MEDICINE
Payer: MEDICARE

## 2018-03-03 VITALS
WEIGHT: 193.12 LBS | HEART RATE: 63 BPM | SYSTOLIC BLOOD PRESSURE: 181 MMHG | OXYGEN SATURATION: 100 % | TEMPERATURE: 97.5 F | DIASTOLIC BLOOD PRESSURE: 83 MMHG | BODY MASS INDEX: 27.65 KG/M2 | HEIGHT: 70 IN | RESPIRATION RATE: 18 BRPM

## 2018-03-03 DIAGNOSIS — E11.65 TYPE 2 DIABETES MELLITUS WITH HYPERGLYCEMIA, WITHOUT LONG-TERM CURRENT USE OF INSULIN (HCC): ICD-10-CM

## 2018-03-03 DIAGNOSIS — R10.84 ABDOMINAL PAIN, GENERALIZED: Primary | ICD-10-CM

## 2018-03-03 DIAGNOSIS — E87.1 CHRONIC HYPONATREMIA: ICD-10-CM

## 2018-03-03 LAB
ALBUMIN SERPL-MCNC: 3.6 G/DL (ref 3.5–5)
ALBUMIN/GLOB SERPL: 0.8 {RATIO} (ref 1.1–2.2)
ALP SERPL-CCNC: 93 U/L (ref 45–117)
ALT SERPL-CCNC: 24 U/L (ref 12–78)
ANION GAP SERPL CALC-SCNC: 5 MMOL/L (ref 5–15)
APPEARANCE UR: CLEAR
AST SERPL-CCNC: 17 U/L (ref 15–37)
BACTERIA URNS QL MICRO: ABNORMAL /HPF
BASOPHILS # BLD: 0.1 K/UL (ref 0–0.1)
BASOPHILS NFR BLD: 1 % (ref 0–1)
BILIRUB SERPL-MCNC: 0.4 MG/DL (ref 0.2–1)
BILIRUB UR QL: NEGATIVE
BUN SERPL-MCNC: 19 MG/DL (ref 6–20)
BUN/CREAT SERPL: 14 (ref 12–20)
CALCIUM SERPL-MCNC: 8.8 MG/DL (ref 8.5–10.1)
CHLORIDE SERPL-SCNC: 98 MMOL/L (ref 97–108)
CO2 SERPL-SCNC: 29 MMOL/L (ref 21–32)
COLOR UR: ABNORMAL
CREAT SERPL-MCNC: 1.33 MG/DL (ref 0.7–1.3)
DIFFERENTIAL METHOD BLD: NORMAL
EOSINOPHIL # BLD: 0.1 K/UL (ref 0–0.4)
EOSINOPHIL NFR BLD: 2 % (ref 0–7)
EPITH CASTS URNS QL MICRO: ABNORMAL /LPF
ERYTHROCYTE [DISTWIDTH] IN BLOOD BY AUTOMATED COUNT: 12.7 % (ref 11.5–14.5)
GLOBULIN SER CALC-MCNC: 4.4 G/DL (ref 2–4)
GLUCOSE BLD STRIP.AUTO-MCNC: 288 MG/DL (ref 65–100)
GLUCOSE SERPL-MCNC: 278 MG/DL (ref 65–100)
GLUCOSE UR STRIP.AUTO-MCNC: 100 MG/DL
HCT VFR BLD AUTO: 45.5 % (ref 36.6–50.3)
HGB BLD-MCNC: 15 G/DL (ref 12.1–17)
HGB UR QL STRIP: NEGATIVE
IMM GRANULOCYTES # BLD: 0 K/UL (ref 0–0.04)
IMM GRANULOCYTES NFR BLD AUTO: 0 % (ref 0–0.5)
KETONES UR QL STRIP.AUTO: NEGATIVE MG/DL
LACTATE SERPL-SCNC: 0.6 MMOL/L (ref 0.4–2)
LEUKOCYTE ESTERASE UR QL STRIP.AUTO: NEGATIVE
LIPASE SERPL-CCNC: 127 U/L (ref 73–393)
LYMPHOCYTES # BLD: 1.8 K/UL (ref 0.8–3.5)
LYMPHOCYTES NFR BLD: 37 % (ref 12–49)
MCH RBC QN AUTO: 29.9 PG (ref 26–34)
MCHC RBC AUTO-ENTMCNC: 33 G/DL (ref 30–36.5)
MCV RBC AUTO: 90.6 FL (ref 80–99)
MONOCYTES # BLD: 0.4 K/UL (ref 0–1)
MONOCYTES NFR BLD: 9 % (ref 5–13)
NEUTS SEG # BLD: 2.6 K/UL (ref 1.8–8)
NEUTS SEG NFR BLD: 52 % (ref 32–75)
NITRITE UR QL STRIP.AUTO: NEGATIVE
NRBC # BLD: 0 K/UL (ref 0–0.01)
NRBC BLD-RTO: 0 PER 100 WBC
OTHER,OTHU: ABNORMAL
PH UR STRIP: 6.5 [PH] (ref 5–8)
PLATELET # BLD AUTO: 308 K/UL (ref 150–400)
PMV BLD AUTO: 10.9 FL (ref 8.9–12.9)
POTASSIUM SERPL-SCNC: 4 MMOL/L (ref 3.5–5.1)
PROT SERPL-MCNC: 8 G/DL (ref 6.4–8.2)
PROT UR STRIP-MCNC: 100 MG/DL
RBC # BLD AUTO: 5.02 M/UL (ref 4.1–5.7)
RBC #/AREA URNS HPF: ABNORMAL /HPF (ref 0–5)
SERVICE CMNT-IMP: ABNORMAL
SODIUM SERPL-SCNC: 132 MMOL/L (ref 136–145)
SP GR UR REFRACTOMETRY: 1.02 (ref 1–1.03)
TROPONIN I SERPL-MCNC: <0.04 NG/ML
UA: UC IF INDICATED,UAUC: ABNORMAL
UROBILINOGEN UR QL STRIP.AUTO: 0.2 EU/DL (ref 0.2–1)
WBC # BLD AUTO: 5 K/UL (ref 4.1–11.1)
WBC URNS QL MICRO: ABNORMAL /HPF (ref 0–4)

## 2018-03-03 PROCEDURE — 74176 CT ABD & PELVIS W/O CONTRAST: CPT

## 2018-03-03 PROCEDURE — 99284 EMERGENCY DEPT VISIT MOD MDM: CPT

## 2018-03-03 PROCEDURE — 83690 ASSAY OF LIPASE: CPT | Performed by: EMERGENCY MEDICINE

## 2018-03-03 PROCEDURE — 80053 COMPREHEN METABOLIC PANEL: CPT | Performed by: EMERGENCY MEDICINE

## 2018-03-03 PROCEDURE — 85025 COMPLETE CBC W/AUTO DIFF WBC: CPT | Performed by: EMERGENCY MEDICINE

## 2018-03-03 PROCEDURE — 81001 URINALYSIS AUTO W/SCOPE: CPT | Performed by: EMERGENCY MEDICINE

## 2018-03-03 PROCEDURE — 87086 URINE CULTURE/COLONY COUNT: CPT | Performed by: EMERGENCY MEDICINE

## 2018-03-03 PROCEDURE — 83605 ASSAY OF LACTIC ACID: CPT | Performed by: EMERGENCY MEDICINE

## 2018-03-03 PROCEDURE — 96360 HYDRATION IV INFUSION INIT: CPT

## 2018-03-03 PROCEDURE — 36415 COLL VENOUS BLD VENIPUNCTURE: CPT | Performed by: EMERGENCY MEDICINE

## 2018-03-03 PROCEDURE — 84484 ASSAY OF TROPONIN QUANT: CPT | Performed by: EMERGENCY MEDICINE

## 2018-03-03 PROCEDURE — 82962 GLUCOSE BLOOD TEST: CPT

## 2018-03-03 PROCEDURE — 93005 ELECTROCARDIOGRAM TRACING: CPT

## 2018-03-03 PROCEDURE — 74011250636 HC RX REV CODE- 250/636: Performed by: EMERGENCY MEDICINE

## 2018-03-03 RX ORDER — ASPIRIN 325 MG
81 TABLET ORAL DAILY
COMMUNITY
End: 2018-06-13

## 2018-03-03 RX ADMIN — SODIUM CHLORIDE 500 ML: 900 INJECTION, SOLUTION INTRAVENOUS at 18:31

## 2018-03-03 NOTE — ED PROVIDER NOTES
EMERGENCY DEPARTMENT HISTORY AND PHYSICAL EXAM      Date: 3/3/2018  Patient Name: Al Cedillo    History of Presenting Illness     Chief Complaint   Patient presents with    Abdominal Pain     Pt presents via EMS complaining of left and right lower abdominal pain. Per EMS, pt's blood sugar was 331. Pt is a known diabetic. Pt reports having abdominal pressure. Pt reports having a bowel movement today. History Provided By: Patient    HPI: Al Cedillo, 76 y.o. male with PMHx significant for HTN, DM, AAA, a-fib, small intestine CA s/p whipple, presents via EMS to the ED with cc of sudden onset lower abd pain with associated single episode of diarrhea and nausea x 1400 today. Pt reports that he was eating a cheeseburger and french fries at [email or web address] at 1400 today prior to the onset of his pain. He was only able to tolerate a few bites of his food. He had his episode of diarrhea shortly after the onset of his abd pain, which was immediately followed by the onset of his nausea, so he decided to come into the ED [facility] ED for evaluation. Pt is still having some pain upon arrival to the ED. Of note, pt has a hx of small intestine CA s/p whipple procedure in 2015. Pt had a PET scan in January 2018 which showed that his CA is now in remission. Pt specifically denies fever, vomiting or chills. PCP: Sanchez Reaves MD    There are no other complaints, changes, or physical findings at this time. Current Facility-Administered Medications   Medication Dose Route Frequency Provider Last Rate Last Dose    sodium chloride 0.9 % bolus infusion 500 mL  500 mL IntraVENous ONCE Lucio Moser MD         Current Outpatient Prescriptions   Medication Sig Dispense Refill    aspirin (ASPIRIN) 325 mg tablet Take 81 mg by mouth daily.  lisinopril (PRINIVIL, ZESTRIL) 40 mg tablet       naproxen (NAPROSYN) 500 mg tablet       metoprolol tartrate (LOPRESSOR) 50 mg tablet Take 50 mg by mouth two (2) times a day.       glimepiride (AMARYL) 2 mg tablet Take 2 mg by mouth every morning.  diphenhydrAMINE (BENADRYL) 25 mg capsule Take 25 mg by mouth every six (6) hours as needed for Sleep.  simvastatin (ZOCOR) 40 mg tablet Take 40 mg by mouth nightly.  pantoprazole (PROTONIX) 40 mg tablet Take 1 Tab by mouth daily. 30 Tab 0       Past History     Past Medical History:  Past Medical History:   Diagnosis Date    Aneurysm ([facility]     aortic    Arrhythmia     A-fib    Calculus of kidney     Cancer ([facility]     Chronic kidney disease     stone    Diabetes ([facility]     Fast heart beat     Former tobacco use     Hypertension     Muscle weakness     Prostate disorder     Stomach pain        Past Surgical History:  Past Surgical History:   Procedure Laterality Date    ABDOMEN SURGERY PROC UNLISTED      Whipple Procedure    HX ORTHOPAEDIC      neck surgery    HX OTHER SURGICAL  1/6/2015    Whipple procedure, G tube, J tube for duodenal cancer    UPPER GI ENDOSCOPY,BALL DIL,30MM  12/2/2014         UPPER GI ENDOSCOPY,BIOPSY  11/26/2014         UPPER GI ENDOSCOPY,BIOPSY  12/2/2014         UPPER GI ENDOSCOPY,DIAGNOSIS  1/23/2015            Family History:  Family History   Problem Relation Age of Onset    Cancer Mother     Heart Disease Mother     Stroke Father     Cancer Brother     Diabetes Brother     Heart Disease Brother        Social History:  Social History   Substance Use Topics    Smoking status: Former Smoker     Packs/day: 0.00     Years: 60.00    Smokeless tobacco: Never Used      Comment: pipe and cigar use currently    Alcohol use 0.0 oz/week      Comment: 1 shot per month       Allergies:  No Known Allergies      Review of Systems   Review of Systems   Constitutional: Negative for activity change, chills and fever. HENT: Negative for congestion and sore throat. Eyes: Negative for pain and redness. Respiratory: Negative for cough, chest tightness and shortness of breath.     Cardiovascular: Negative for chest pain and palpitations. Gastrointestinal: Positive for abdominal pain (lower), diarrhea and nausea. Negative for vomiting. Genitourinary: Negative for dysuria, frequency and urgency. Musculoskeletal: Negative for back pain and neck pain. Skin: Negative for rash. Neurological: Negative for syncope, light-headedness and headaches. Psychiatric/Behavioral: Negative for confusion. All other systems reviewed and are negative. Physical Exam   Physical Exam   Constitutional: He is oriented to person, place, and time. He appears well-developed and well-nourished. No distress. Calm and pleasant elderly white male   HENT:   Head: Normocephalic. Nose: Nose normal.   Mouth/Throat: Oropharynx is clear and moist. No oropharyngeal exudate. Eyes: Conjunctivae are normal. Pupils are equal, round, and reactive to light. No scleral icterus. Neck: Normal range of motion. Neck supple. No JVD present. No tracheal deviation present. No thyromegaly present. Cardiovascular: Normal rate, regular rhythm and intact distal pulses. Exam reveals no gallop and no friction rub. No murmur heard. Pulmonary/Chest: Effort normal and breath sounds normal. No stridor. No respiratory distress. He has no wheezes. He has no rales. Abdominal: Soft. Bowel sounds are normal. He exhibits no distension. There is tenderness. There is no rebound (minimal RLQ tenderness) and no guarding. Musculoskeletal: Normal range of motion. He exhibits no edema. Lymphadenopathy:     He has no cervical adenopathy. Neurological: He is alert and oriented to person, place, and time. No cranial nerve deficit. He exhibits normal muscle tone. Coordination normal.   Skin: Skin is warm and dry. No rash noted. He is not diaphoretic. No erythema. Psychiatric: He has a normal mood and affect. His behavior is normal.   Nursing note and vitals reviewed.         Diagnostic Study Results     Labs -  Recent Results (from the past 12 hour(s))   GLUCOSE, POC    Collection Time: 03/03/18  5:34 PM   Result Value Ref Range    Glucose (POC) 288 (H) 65 - 100 mg/dL    Performed by Silvana Cornell    EKG, 12 LEAD, INITIAL    Collection Time: 03/03/18  5:50 PM   Result Value Ref Range    Ventricular Rate 62 BPM    Atrial Rate 62 BPM    P-R Interval 256 ms    QRS Duration 100 ms    Q-T Interval 466 ms    QTC Calculation (Bezet) 472 ms    Calculated P Axis 73 degrees    Calculated R Axis 44 degrees    Calculated T Axis 50 degrees    Diagnosis       Sinus rhythm with 1st degree AV block  When compared with ECG of 01-DEC-2017 14:53,  No significant change was found     CBC WITH AUTOMATED DIFF    Collection Time: 03/03/18  6:10 PM   Result Value Ref Range    WBC 5.0 4.1 - 11.1 K/uL    RBC 5.02 4.10 - 5.70 M/uL    HGB 15.0 12.1 - 17.0 g/dL    HCT 45.5 36.6 - 50.3 %    MCV 90.6 80.0 - 99.0 FL    MCH 29.9 26.0 - 34.0 PG    MCHC 33.0 30.0 - 36.5 g/dL    RDW 12.7 11.5 - 14.5 %    PLATELET 823 885 - 263 K/uL    MPV 10.9 8.9 - 12.9 FL    NRBC 0.0 0  WBC    ABSOLUTE NRBC 0.00 0.00 - 0.01 K/uL    NEUTROPHILS 52 32 - 75 %    LYMPHOCYTES 37 12 - 49 %    MONOCYTES 9 5 - 13 %    EOSINOPHILS 2 0 - 7 %    BASOPHILS 1 0 - 1 %    IMMATURE GRANULOCYTES 0 0.0 - 0.5 %    ABS. NEUTROPHILS 2.6 1.8 - 8.0 K/UL    ABS. LYMPHOCYTES 1.8 0.8 - 3.5 K/UL    ABS. MONOCYTES 0.4 0.0 - 1.0 K/UL    ABS. EOSINOPHILS 0.1 0.0 - 0.4 K/UL    ABS. BASOPHILS 0.1 0.0 - 0.1 K/UL    ABS. IMM.  GRANS. 0.0 0.00 - 0.04 K/UL    DF AUTOMATED     METABOLIC PANEL, COMPREHENSIVE    Collection Time: 03/03/18  6:10 PM   Result Value Ref Range    Sodium 132 (L) 136 - 145 mmol/L    Potassium 4.0 3.5 - 5.1 mmol/L    Chloride 98 97 - 108 mmol/L    CO2 29 21 - 32 mmol/L    Anion gap 5 5 - 15 mmol/L    Glucose 278 (H) 65 - 100 mg/dL    BUN 19 6 - 20 MG/DL    Creatinine 1.33 (H) 0.70 - 1.30 MG/DL    BUN/Creatinine ratio 14 12 - 20      GFR est AA >60 >60 ml/min/1.73m2    GFR est non-AA 52 (L) >60 ml/min/1.73m2 Calcium 8.8 8.5 - 10.1 MG/DL    Bilirubin, total 0.4 0.2 - 1.0 MG/DL    ALT (SGPT) 24 12 - 78 U/L    AST (SGOT) 17 15 - 37 U/L    Alk. phosphatase 93 45 - 117 U/L    Protein, total 8.0 6.4 - 8.2 g/dL    Albumin 3.6 3.5 - 5.0 g/dL    Globulin 4.4 (H) 2.0 - 4.0 g/dL    A-G Ratio 0.8 (L) 1.1 - 2.2     LIPASE    Collection Time: 03/03/18  6:10 PM   Result Value Ref Range    Lipase 127 73 - 393 U/L   LACTIC ACID    Collection Time: 03/03/18  6:10 PM   Result Value Ref Range    Lactic acid 0.6 0.4 - 2.0 MMOL/L   TROPONIN I    Collection Time: 03/03/18  6:10 PM   Result Value Ref Range    Troponin-I, Qt. <0.04 <0.05 ng/mL   URINALYSIS W/ REFLEX CULTURE    Collection Time: 03/03/18  7:39 PM   Result Value Ref Range    Color YELLOW/STRAW      Appearance CLEAR CLEAR      Specific gravity 1.017 1.003 - 1.030      pH (UA) 6.5 5.0 - 8.0      Protein 100 (A) NEG mg/dL    Glucose 100 (A) NEG mg/dL    Ketone NEGATIVE  NEG mg/dL    Bilirubin NEGATIVE  NEG      Blood NEGATIVE  NEG      Urobilinogen 0.2 0.2 - 1.0 EU/dL    Nitrites NEGATIVE  NEG      Leukocyte Esterase NEGATIVE  NEG      WBC 0-4 0 - 4 /hpf    RBC 0-5 0 - 5 /hpf    Epithelial cells FEW FEW /lpf    Bacteria 1+ (A) NEG /hpf    UA:UC IF INDICATED URINE CULTURE ORDERED (A) CNI      Other: Renal Epithelial cells Present         Radiologic Studies -   CT Results  (Last 48 hours)               03/03/18 1911  CT ABD PELV WO CONT Final result    Impression:  IMPRESSION:    1. No acute abdominal or pelvic abnormality. 2. Atherosclerosis with aortic valve and coronary artery calcifications. 3. Atherosclerotic abdominal aorta small 3.1 cm aneurysm unchanged. 4. Status   post gastric antrectomy. 5. Status post cholecystectomy. 6. Bilateral hyperdense renal cysts. 7. Diverticulosis. 8. Mild lumbar spondylosis. Narrative:  EXAM: CT ABDOMEN AND PELVIS WITHOUT CONTRAST   INDICATION:  Right lower quadrant abdominal pain. COMPARISON: 1/19/2018. CONTRAST: None. TECHNIQUE:    Unenhanced multislice helical CT was performed from the diaphragm to the   symphysis pubis without intravenous contrast administration. Oral contrast was   not administered. Contiguous 5 mm axial images were reconstructed and lung and   soft tissue windows were generated. Coronal and sagittal reformations were   generated. CT dose reduction was achieved through use of a standardized protocol   tailored for this examination and automatic exposure control for dose   modulation. FINDINGS:   LOWER CHEST: The visualized portions of the lung bases are clear. The aorta is   atherosclerotic and there are calcifications of the aortic valve and coronary   arteries. The absence of intravenous contrast material reduces the sensitivity for   evaluation of the solid parenchymal organs of the abdomen. ABDOMEN:   Liver: The liver is normal in size and contour with no focal abnormality. Gallbladder and bile ducts: There are clips in the gallbladder fossa and the   gallbladder is absent. Spleen: No abnormality. Pancreas: No abnormality. Adrenal glands: No abnormality. Kidneys: There are bilateral small high attenuation renal lesions consistent   with hyperdense cysts. There is no renal or ureteral calculus or obstruction. PELVIS:   Reproductive organs: The prostate gland and seminal vesicles are symmetrical.     Bladder: No abnormality. RETROPERITONEUM: The aorta is atherosclerotic and there is a small 3.1 cm   aneurysm at the level of the renal arteries which is unchanged. There is no   retroperitoneal adenopathy or mass. There is no pelvic mass or adenopathy. BOWEL AND MESENTERY: There are staples from previous gastric antrectomy and   small bowel surgery without obstruction. The appendix is normal.  There are   diverticula of the colon without evidence of diverticulitis. PERITONEUM: There is no ascites or free intraperitoneal air.    BONES AND SOFT TISSUES: There is mild dextroconvex scoliosis and degenerative   change of the spine. Medical Decision Making   I am the first provider for this patient. I reviewed the vital signs, available nursing notes, past medical history, past surgical history, family history and social history. Vital Signs-Reviewed the patient's vital signs. Patient Vitals for the past 12 hrs:   Temp Pulse Resp BP SpO2   03/03/18 1945 - 63 - 181/83 100 %   03/03/18 1845 - - - (!) 189/91 99 %   03/03/18 1834 - - - - 100 %   03/03/18 1833 - - - 177/89 -   03/03/18 1750 - - - - 98 %   03/03/18 1748 - - - (!) 147/91 -   03/03/18 1736 - - - - 98 %   03/03/18 1734 - - - (!) 149/91 -   03/03/18 1732 97.5 °F (36.4 °C) 62 18 (!) 149/91 98 %       Pulse Oximetry Analysis - 98% on RA    Cardiac Monitor:   Rate: 62 bpm  Rhythm: Normal Sinus Rhythm      ED EKG interpretation: 18:02  Rhythm: sinus bradycardia; and regular . Rate (approx.): 59; Axis: normal; OH Interval: 1st degree av block; QRS interval: normal ; ST/T wave: normal; This EKG was interpreted by Mahad Miles MD,ED Provider. Records Reviewed: Nursing Notes and Old Medical Records    Provider Notes (Medical Decision Making):   DDx: appendicitis, SBO    5:43 PM  I have reviewed medical records in the EHR, pertinent to patients present condition/presenting problems. Excerpt from recent oncology visit note from Dr. Chelo Radford on 2/12/18: Merceda Oppenheim is a 76 y.o. 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 He underwent a pancreato-duodenectomy on 01/06/2015. The tumor in the duodenum was found to be 2.5 cm in size and invaded the pancreatic head and 1/15 LN had disease. He stayed in the hospital until Feb 2015 recovering from the surgery. Mr. Deisy Whitehead elected to receive adjuvant chemotherapy in the form of single agent Capecitabine. He suffered severe diarrhea and I had to discontinue therapy early in the course.  \"  Recent ct in January 2018 showed no change in retroperitoneal lad since 2015; felt to be in remission at this time. Mahad Miles MD    ED Course:   Initial assessment performed. The patients presenting problems have been discussed, and they are in agreement with the care plan formulated and outlined with them. I have encouraged them to ask questions as they arise throughout their visit. Disposition:  DISCHARGE NOTE:  7:40 PM  Pt has been reexamined. Pt has no new complaints, changes, or physical findings. Care plan outlined and precautions discussed. All available results reviewed with pt. All medications reviewed with pt. All of pts questions and concerns addressed. Pt agrees to f/u as instructed and agrees to return to ED upon further deterioration. Pt is ready to go home. Written by Nereyda Thompson ED Scribe as dictated by Mahad Miles MD      CT abd pelvis without acute abnormality; vss; afebrile; cbc,cmp unreamrkable; negative troponin; normal lipase and lactate; Mahad Miles MD        PLAN:  1. Discharge Medication List as of 3/3/2018  7:40 PM        2. Follow-up Information     None        Return to ED if worse     Diagnosis     Clinical Impression:   1. Abdominal pain, generalized    2. Type 2 diabetes mellitus with hyperglycemia, without long-term current use of insulin (HCC)    3. Chronic hyponatremia        Attestations: This note is prepared by Nereyda Thompson acting as scribe for MD Mahad Pena MD : The scribe's documentation has been prepared under my direction and personally reviewed by me in its entirety. I confirm that the note above accurately reflects all work, treatment, procedures, and medical decision making performed by me.

## 2018-03-03 NOTE — ED NOTES
Pt up to bedside commode. Pt placed back in bed without success in voiding or having a bowel movement.

## 2018-03-03 NOTE — ED NOTES
Assumed care of pt from EMS. Pt presents to ED with chief complaint of abdominal pressure. Per EMS, pt was at [facility] eating and reports Left and Right lower quadrant abdominal pain that started when he was eating. EMS reports pt is a known diabetic with a sugar of 331. Pt reports having a bowel movement today. Pt is A&O x 4. Pt denies any other symptoms at this time. Pt resting comfortably on the stretcher in a position of comfort. Pt in no acute distress at this time. Call bell within reach. Side rails x 2. Cardiac monitor x 2. Stretcher locked in the lowest position. Pt aware of plan to await for MD/PA-C/NP assessment, and pt/family verbalizes understanding. Will continue to monitor.

## 2018-03-03 NOTE — ED NOTES
Pt up to bedside commode with RN assistance. Pt did not void or have any bowel movement. Pt placed back in bed.

## 2018-03-04 LAB
ATRIAL RATE: 62 BPM
CALCULATED P AXIS, ECG09: 73 DEGREES
CALCULATED R AXIS, ECG10: 44 DEGREES
CALCULATED T AXIS, ECG11: 50 DEGREES
DIAGNOSIS, 93000: NORMAL
P-R INTERVAL, ECG05: 256 MS
Q-T INTERVAL, ECG07: 466 MS
QRS DURATION, ECG06: 100 MS
QTC CALCULATION (BEZET), ECG08: 472 MS
VENTRICULAR RATE, ECG03: 62 BPM

## 2018-03-04 NOTE — ED NOTES
Bedside and verbal report given to LEAH Bardales on Jhonatan Nunezman at shift change for routine progression of care. Report consisted of patients Situation, Background, Assessment and Recommendations(SBAR). Information from the following report(s) SBAR, Kardex, ED Summary, [facility] and Recent Results was reviewed with the receiving nurse. Opportunity for questions and clarification was provided.

## 2018-03-04 NOTE — DISCHARGE INSTRUCTIONS
Abdominal Pain: Care Instructions  Your Care Instructions    Abdominal pain has many possible causes. Some aren't serious and get better on their own in a few days. Others need more testing and treatment. If your pain continues or gets worse, you need to be rechecked and may need more tests to find out what is wrong. You may need surgery to correct the problem. Don't ignore new symptoms, such as fever, nausea and vomiting, urination problems, pain that gets worse, and dizziness. These may be signs of a more serious problem. Your doctor may have recommended a follow-up visit in the next 8 to 12 hours. If you are not getting better, you may need more tests or treatment. The doctor has checked you carefully, but problems can develop later. If you notice any problems or new symptoms, get medical treatment right away. Follow-up care is a key part of your treatment and safety. Be sure to make and go to all appointments, and call your doctor if you are having problems. It's also a good idea to know your test results and keep a list of the medicines you take. How can you care for yourself at home? · Rest until you feel better. · To prevent dehydration, drink plenty of fluids, enough so that your urine is light yellow or clear like water. Choose water and other caffeine-free clear liquids until you feel better. If you have kidney, heart, or liver disease and have to limit fluids, talk with your doctor before you increase the amount of fluids you drink. · If your stomach is upset, eat mild foods, such as rice, dry toast or crackers, bananas, and applesauce. Try eating several small meals instead of two or three large ones. · Wait until 48 hours after all symptoms have gone away before you have spicy foods, alcohol, and drinks that contain caffeine. · Do not eat foods that are high in fat. · Avoid anti-inflammatory medicines such as aspirin, ibuprofen (Advil, Motrin), and naproxen (Aleve).  These can cause stomach upset. Talk to your doctor if you take daily aspirin for another health problem. When should you call for help? Call 911 anytime you think you may need emergency care. For example, call if:  ? · You passed out (lost consciousness). ? · You pass maroon or very bloody stools. ? · You vomit blood or what looks like coffee grounds. ? · You have new, severe belly pain. ?Call your doctor now or seek immediate medical care if:  ? · Your pain gets worse, especially if it becomes focused in one area of your belly. ? · You have a new or higher fever. ? · Your stools are black and look like tar, or they have streaks of blood. ? · You have unexpected vaginal bleeding. ? · You have symptoms of a urinary tract infection. These may include:  ¨ Pain when you urinate. ¨ Urinating more often than usual.  ¨ Blood in your urine. ? · You are dizzy or lightheaded, or you feel like you may faint. ? Watch closely for changes in your health, and be sure to contact your doctor if:  ? · You are not getting better after 1 day (24 hours). Where can you learn more? Go to http://saludSAW Instrumentsusana.info/. Enter O857 in the search box to learn more about \"Abdominal Pain: Care Instructions. \"  Current as of: March 20, 2017  Content Version: 11.4  © 2298-2390 [employer]. Care instructions adapted under license by [employer] (which disclaims liability or warranty for this information). If you have questions about a medical condition or this instruction, always ask your healthcare professional. [place] [address] any warranty or liability for your use of this information. Learning About Diabetes Food Guidelines  Your Care Instructions    Meal planning is important to manage diabetes. It helps keep your blood sugar at a target level (which you set with your doctor). You don't have to eat special foods.  You can eat what your family eats, including sweets once in a while. But you do have to pay attention to how often you eat and how much you eat of certain foods. You may want to work with a dietitian or a certified diabetes educator (CDE) to help you plan meals and snacks. A dietitian or CDE can also help you lose weight if that is one of your goals. What should you know about eating carbs? Managing the amount of carbohydrate (carbs) you eat is an important part of healthy meals when you have diabetes. Carbohydrate is found in many foods. · Learn which foods have carbs. And learn the amounts of carbs in different foods. ¨ Bread, cereal, pasta, and rice have about 15 grams of carbs in a serving. A serving is 1 slice of bread (1 ounce), ½ cup of cooked cereal, or 1/3 cup of cooked pasta or rice. ¨ Fruits have 15 grams of carbs in a serving. A serving is 1 small fresh fruit, such as an apple or orange; ½ of a banana; ½ cup of cooked or canned fruit; ½ cup of fruit juice; 1 cup of melon or raspberries; or 2 tablespoons of dried fruit. ¨ Milk and no-sugar-added yogurt have 15 grams of carbs in a serving. A serving is 1 cup of milk or 2/3 cup of no-sugar-added yogurt. ¨ Starchy vegetables have 15 grams of carbs in a serving. A serving is ½ cup of mashed potatoes or sweet potato; 1 cup winter squash; ½ of a small baked potato; ½ cup of cooked beans; or ½ cup cooked corn or green peas. · Learn how much carbs to eat each day and at each meal. A dietitian or CDE can teach you how to keep track of the amount of carbs you eat. This is called carbohydrate counting. · If you are not sure how to count carbohydrate grams, use the Plate Method to plan meals. It is a good, quick way to make sure that you have a balanced meal. It also helps you spread carbs throughout the day. ¨ Divide your plate by types of foods.  Put non-starchy vegetables on half the plate, meat or other protein food on one-quarter of the plate, and a grain or starchy vegetable in the final quarter of the plate. To this you can add a small piece of fruit and 1 cup of milk or yogurt, depending on how many carbs you are supposed to eat at a meal.  · Try to eat about the same amount of carbs at each meal. Do not \"save up\" your daily allowance of carbs to eat at one meal.  · Proteins have very little or no carbs per serving. Examples of proteins are beef, chicken, turkey, fish, eggs, tofu, cheese, cottage cheese, and peanut butter. A serving size of meat is 3 ounces, which is about the size of a deck of cards. Examples of meat substitute serving sizes (equal to 1 ounce of meat) are 1/4 cup of cottage cheese, 1 egg, 1 tablespoon of peanut butter, and ½ cup of tofu. How can you eat out and still eat healthy? · Learn to estimate the serving sizes of foods that have carbohydrate. If you measure food at home, it will be easier to estimate the amount in a serving of restaurant food. · If the meal you order has too much carbohydrate (such as potatoes, corn, or baked beans), ask to have a low-carbohydrate food instead. Ask for a salad or green vegetables. · If you use insulin, check your blood sugar before and after eating out to help you plan how much to eat in the future. · If you eat more carbohydrate at a meal than you had planned, take a walk or do other exercise. This will help lower your blood sugar. What else should you know? · Limit saturated fat, such as the fat from meat and dairy products. This is a healthy choice because people who have diabetes are at higher risk of heart disease. So choose lean cuts of meat and nonfat or low-fat dairy products. Use olive or canola oil instead of butter or shortening when cooking. · Don't skip meals. Your blood sugar may drop too low if you skip meals and take insulin or certain medicines for diabetes. · Check with your doctor before you drink alcohol. Alcohol can cause your blood sugar to drop too low.  Alcohol can also cause a bad reaction if you take certain diabetes medicines. Follow-up care is a key part of your treatment and safety. Be sure to make and go to all appointments, and call your doctor if you are having problems. It's also a good idea to know your test results and keep a list of the medicines you take. Where can you learn more? Go to http://salud-susaan.info/. Enter V066 in the search box to learn more about \"Learning About Diabetes Food Guidelines. \"  Current as of: March 13, 2017  Content Version: 11.4  © 4273-4456 [employer]. Care instructions adapted under license by [employer] (which disclaims liability or warranty for this information). If you have questions about a medical condition or this instruction, always ask your healthcare professional. [address] any warranty or liability for your use of this information.

## 2018-03-04 NOTE — ED NOTES
MD Julien Turner has reviewed discharge instructions with the patient. The patient verbalized understanding. Pt discharged with written instructions. No further concerns at this time. IV removed. Pt wheeled to exit.

## 2018-03-05 LAB
BACTERIA SPEC CULT: NORMAL
CC UR VC: NORMAL
SERVICE CMNT-IMP: NORMAL

## 2018-06-11 ENCOUNTER — APPOINTMENT (OUTPATIENT)
Dept: CT IMAGING | Age: 78
End: 2018-06-11
Attending: EMERGENCY MEDICINE
Payer: MEDICARE

## 2018-06-11 ENCOUNTER — APPOINTMENT (OUTPATIENT)
Dept: GENERAL RADIOLOGY | Age: 78
End: 2018-06-11
Attending: EMERGENCY MEDICINE
Payer: MEDICARE

## 2018-06-11 ENCOUNTER — HOSPITAL ENCOUNTER (OUTPATIENT)
Age: 78
Setting detail: OBSERVATION
Discharge: HOME | End: 2018-06-13
Attending: EMERGENCY MEDICINE | Admitting: INTERNAL MEDICINE
Payer: MEDICARE

## 2018-06-11 DIAGNOSIS — I10 ESSENTIAL HYPERTENSION: ICD-10-CM

## 2018-06-11 DIAGNOSIS — C17.0 CARCINOMA OF DUODENUM (HCC): ICD-10-CM

## 2018-06-11 DIAGNOSIS — R26.81 UNSTEADY GAIT: ICD-10-CM

## 2018-06-11 DIAGNOSIS — R41.0 DISORIENTATION: Primary | ICD-10-CM

## 2018-06-11 DIAGNOSIS — E78.01 FAMILIAL HYPERCHOLESTEROLEMIA: ICD-10-CM

## 2018-06-11 LAB
ALBUMIN SERPL-MCNC: 4.2 G/DL (ref 3.5–5)
ALBUMIN/GLOB SERPL: 1 {RATIO} (ref 1.1–2.2)
ALP SERPL-CCNC: 95 U/L (ref 45–117)
ALT SERPL-CCNC: 22 U/L (ref 12–78)
ANION GAP SERPL CALC-SCNC: 9 MMOL/L (ref 5–15)
APPEARANCE UR: CLEAR
AST SERPL-CCNC: 17 U/L (ref 15–37)
BACTERIA URNS QL MICRO: NEGATIVE /HPF
BASOPHILS # BLD: 0 K/UL (ref 0–0.1)
BASOPHILS NFR BLD: 1 % (ref 0–1)
BILIRUB SERPL-MCNC: 0.3 MG/DL (ref 0.2–1)
BILIRUB UR QL: NEGATIVE
BUN SERPL-MCNC: 24 MG/DL (ref 6–20)
BUN/CREAT SERPL: 16 (ref 12–20)
CALCIUM SERPL-MCNC: 9.4 MG/DL (ref 8.5–10.1)
CHLORIDE SERPL-SCNC: 101 MMOL/L (ref 97–108)
CK MB CFR SERPL CALC: 1 % (ref 0–2.5)
CK MB SERPL-MCNC: 1 NG/ML (ref 5–25)
CK SERPL-CCNC: 100 U/L (ref 39–308)
CO2 SERPL-SCNC: 27 MMOL/L (ref 21–32)
COLOR UR: ABNORMAL
CREAT SERPL-MCNC: 1.5 MG/DL (ref 0.7–1.3)
DIFFERENTIAL METHOD BLD: ABNORMAL
EOSINOPHIL # BLD: 0.1 K/UL (ref 0–0.4)
EOSINOPHIL NFR BLD: 2 % (ref 0–7)
EPITH CASTS URNS QL MICRO: ABNORMAL /LPF
ERYTHROCYTE [DISTWIDTH] IN BLOOD BY AUTOMATED COUNT: 13.2 % (ref 11.5–14.5)
GLOBULIN SER CALC-MCNC: 4.4 G/DL (ref 2–4)
GLUCOSE SERPL-MCNC: 108 MG/DL (ref 65–100)
GLUCOSE UR STRIP.AUTO-MCNC: 100 MG/DL
HCT VFR BLD AUTO: 44 % (ref 36.6–50.3)
HGB BLD-MCNC: 14.7 G/DL (ref 12.1–17)
HGB UR QL STRIP: NEGATIVE
HYALINE CASTS URNS QL MICRO: ABNORMAL /LPF (ref 0–5)
IMM GRANULOCYTES # BLD: 0 K/UL (ref 0–0.04)
IMM GRANULOCYTES NFR BLD AUTO: 0 % (ref 0–0.5)
KETONES UR QL STRIP.AUTO: NEGATIVE MG/DL
LACTATE SERPL-SCNC: 1.3 MMOL/L (ref 0.4–2)
LEUKOCYTE ESTERASE UR QL STRIP.AUTO: NEGATIVE
LYMPHOCYTES # BLD: 3.1 K/UL (ref 0.8–3.5)
LYMPHOCYTES NFR BLD: 52 % (ref 12–49)
MCH RBC QN AUTO: 30.6 PG (ref 26–34)
MCHC RBC AUTO-ENTMCNC: 33.4 G/DL (ref 30–36.5)
MCV RBC AUTO: 91.7 FL (ref 80–99)
MONOCYTES # BLD: 0.5 K/UL (ref 0–1)
MONOCYTES NFR BLD: 9 % (ref 5–13)
NEUTS SEG # BLD: 2.2 K/UL (ref 1.8–8)
NEUTS SEG NFR BLD: 37 % (ref 32–75)
NITRITE UR QL STRIP.AUTO: NEGATIVE
NRBC # BLD: 0 K/UL (ref 0–0.01)
NRBC BLD-RTO: 0 PER 100 WBC
PH UR STRIP: 6.5 [PH] (ref 5–8)
PLATELET # BLD AUTO: 323 K/UL (ref 150–400)
PMV BLD AUTO: 10.5 FL (ref 8.9–12.9)
POTASSIUM SERPL-SCNC: 4.2 MMOL/L (ref 3.5–5.1)
PROT SERPL-MCNC: 8.6 G/DL (ref 6.4–8.2)
PROT UR STRIP-MCNC: 100 MG/DL
RBC # BLD AUTO: 4.8 M/UL (ref 4.1–5.7)
RBC #/AREA URNS HPF: ABNORMAL /HPF (ref 0–5)
SODIUM SERPL-SCNC: 137 MMOL/L (ref 136–145)
SP GR UR REFRACTOMETRY: 1.02 (ref 1–1.03)
TROPONIN I SERPL-MCNC: <0.04 NG/ML
UA: UC IF INDICATED,UAUC: ABNORMAL
UROBILINOGEN UR QL STRIP.AUTO: 0.2 EU/DL (ref 0.2–1)
WBC # BLD AUTO: 5.9 K/UL (ref 4.1–11.1)
WBC URNS QL MICRO: ABNORMAL /HPF (ref 0–4)

## 2018-06-11 PROCEDURE — 36415 COLL VENOUS BLD VENIPUNCTURE: CPT | Performed by: EMERGENCY MEDICINE

## 2018-06-11 PROCEDURE — 80053 COMPREHEN METABOLIC PANEL: CPT | Performed by: EMERGENCY MEDICINE

## 2018-06-11 PROCEDURE — 70450 CT HEAD/BRAIN W/O DYE: CPT

## 2018-06-11 PROCEDURE — 81001 URINALYSIS AUTO W/SCOPE: CPT | Performed by: EMERGENCY MEDICINE

## 2018-06-11 PROCEDURE — 85025 COMPLETE CBC W/AUTO DIFF WBC: CPT | Performed by: EMERGENCY MEDICINE

## 2018-06-11 PROCEDURE — 93005 ELECTROCARDIOGRAM TRACING: CPT

## 2018-06-11 PROCEDURE — 80307 DRUG TEST PRSMV CHEM ANLYZR: CPT | Performed by: EMERGENCY MEDICINE

## 2018-06-11 PROCEDURE — 99285 EMERGENCY DEPT VISIT HI MDM: CPT

## 2018-06-11 PROCEDURE — 71046 X-RAY EXAM CHEST 2 VIEWS: CPT

## 2018-06-11 PROCEDURE — 82550 ASSAY OF CK (CPK): CPT | Performed by: EMERGENCY MEDICINE

## 2018-06-11 PROCEDURE — 83605 ASSAY OF LACTIC ACID: CPT | Performed by: EMERGENCY MEDICINE

## 2018-06-11 PROCEDURE — 84484 ASSAY OF TROPONIN QUANT: CPT | Performed by: EMERGENCY MEDICINE

## 2018-06-11 NOTE — IP AVS SNAPSHOT
[address] [facility] 
[phone] Patient: Gypsy Anderson MRN: [id number] PNK:[id number] About your hospitalization You were admitted on:  June 12, 2018 You last received care in the:  \[facility]\"" 3 NEUROSCIENCE TELEMETRY You were discharged on:  June 13, 2018 Why you were hospitalized Your primary diagnosis was:  Not on File Your diagnoses also included: Altered Mental Status Follow-up Information Follow up With Details Comments Contact Info Lamont Espana MD Go on 6/26/2018 Please follow up on June 26, 2018 at 2:45 [address] [facility] 
[phone] [facility] Office on [facility] [address] [phone] Amor Pacheco MD Go on 8/14/2018 Please follow up on August 14, 2018 at 9:40 arriving at 9:15 to register for your appointment with photo ID, insurance card and current list of medication  [address] 
MOB 3 [address] [facility] 
[phone] Discharge Orders None A check roberth indicates which time of day the medication should be taken. My Medications START taking these medications Instructions Each Dose to Equal  
 Morning Noon Evening Bedtime  
 aspirin 81 mg chewable tablet Start taking on:  6/14/2018 Replaces:  aspirin 325 mg tablet Your last dose was: Your next dose is: Take 1 Tab by mouth daily. 81 mg CONTINUE taking these medications Instructions Each Dose to Equal  
 Morning Noon Evening Bedtime BENADRYL 25 mg capsule Generic drug:  diphenhydrAMINE Your last dose was: Your next dose is: Take 25 mg by mouth every six (6) hours as needed for Sleep. 25 mg  
    
   
   
   
  
 glimepiride 2 mg tablet Commonly known as:  AMARYL Your last dose was: Your next dose is: Take 2 mg by mouth every morning. 2 mg  
    
   
   
   
  
 lisinopril 40 mg tablet Commonly known as:  Velora Larry Your last dose was: Your next dose is: LOPRESSOR 50 mg tablet Generic drug:  metoprolol tartrate Your last dose was: Your next dose is: Take 50 mg by mouth two (2) times a day. 50 mg  
    
   
   
   
  
 pantoprazole 40 mg tablet Commonly known as:  PROTONIX Your last dose was: Your next dose is: Take 1 Tab by mouth daily. 40 mg  
    
   
   
   
  
 simvastatin 40 mg tablet Commonly known as:  ZOCOR Your last dose was: Your next dose is: Take 40 mg by mouth nightly. 40 mg  
    
   
   
   
  
  
STOP taking these medications   
 aspirin 325 mg tablet Commonly known as:  ASPIRIN Replaced by:  aspirin 81 mg chewable tablet  
   
  
 naproxen 500 mg tablet Commonly known as:  NAPROSYN Where to Get Your Medications These medications were sent to [facility], [address] [place] [address] Avenue  [address] [facility] [place] [address] Phone:  [phone]  
  aspirin 81 mg chewable tablet Discharge Instructions High Blood Pressure: Care Instructions Your Care Instructions If your blood pressure is usually above 140/90, you have high blood pressure, or hypertension. That means the top number is 140 or higher or the bottom number is 90 or higher, or both. Despite what a lot of people think, high blood pressure usually doesn't cause headaches or make you feel dizzy or lightheaded. It usually has no symptoms. But it does increase your risk for heart attack, stroke, and kidney or eye damage. The higher your blood pressure, the more your risk increases. Your doctor will give you a goal for your blood pressure.  Your goal will be based on your health and your age. An example of a goal is to keep your blood pressure below 140/90. Lifestyle changes, such as eating healthy and being active, are always important to help lower blood pressure. You might also take medicine to reach your blood pressure goal. 
Follow-up care is a key part of your treatment and safety. Be sure to make and go to all appointments, and call your doctor if you are having problems. It's also a good idea to know your test results and keep a list of the medicines you take. How can you care for yourself at home? Medical treatment · If you stop taking your medicine, your blood pressure will go back up. You may take one or more types of medicine to lower your blood pressure. Be safe with medicines. Take your medicine exactly as prescribed. Call your doctor if you think you are having a problem with your medicine. · Talk to your doctor before you start taking aspirin every day. Aspirin can help certain people lower their risk of a heart attack or stroke. But taking aspirin isn't right for everyone, because it can cause serious bleeding. · See your doctor regularly. You may need to see the doctor more often at first or until your blood pressure comes down. · If you are taking blood pressure medicine, talk to your doctor before you take decongestants or anti-inflammatory medicine, such as ibuprofen. Some of these medicines can raise blood pressure. · Learn how to check your blood pressure at home. Lifestyle changes · Stay at a healthy weight. This is especially important if you put on weight around the waist. Losing even 10 pounds can help you lower your blood pressure. · If your doctor recommends it, get more exercise. Walking is a good choice. Bit by bit, increase the amount you walk every day. Try for at least 30 minutes on most days of the week. You also may want to swim, bike, or do other activities. · Avoid or limit alcohol. Talk to your doctor about whether you can drink any alcohol. · Try to limit how much sodium you eat to less than 2,300 milligrams (mg) a day. Your doctor may ask you to try to eat less than 1,500 mg a day. · Eat plenty of fruits (such as bananas and oranges), vegetables, legumes, whole grains, and low-fat dairy products. · Lower the amount of saturated fat in your diet. Saturated fat is found in animal products such as milk, cheese, and meat. Limiting these foods may help you lose weight and also lower your risk for heart disease. · Do not smoke. Smoking increases your risk for heart attack and stroke. If you need help quitting, talk to your doctor about stop-smoking programs and medicines. These can increase your chances of quitting for good. When should you call for help? Call 911 anytime you think you may need emergency care. This may mean having symptoms that suggest that your blood pressure is causing a serious heart or blood vessel problem. Your blood pressure may be over 180/110. ? For example, call 911 if: 
? · You have symptoms of a heart attack. These may include: ¨ Chest pain or pressure, or a strange feeling in the chest. 
¨ Sweating. ¨ Shortness of breath. ¨ Nausea or vomiting. ¨ Pain, pressure, or a strange feeling in the back, neck, jaw, or upper belly or in one or both shoulders or arms. ¨ Lightheadedness or sudden weakness. ¨ A fast or irregular heartbeat. ? · You have symptoms of a stroke. These may include: 
¨ Sudden numbness, tingling, weakness, or loss of movement in your face, arm, or leg, especially on only one side of your body. ¨ Sudden vision changes. ¨ Sudden trouble speaking. ¨ Sudden confusion or trouble understanding simple statements. ¨ Sudden problems with walking or balance. ¨ A sudden, severe headache that is different from past headaches. ? · You have severe back or belly pain. ?Do not wait until your blood pressure comes down on its own. Get help right away. ?Call your doctor now or seek immediate care if: 
? · Your blood pressure is much higher than normal (such as 180/110 or higher), but you don't have symptoms. ? · You think high blood pressure is causing symptoms, such as: ¨ Severe headache. ¨ Blurry vision. ? Watch closely for changes in your health, and be sure to contact your doctor if: 
? · Your blood pressure measures 140/90 or higher at least 2 times. That means the top number is 140 or higher or the bottom number is 90 or higher, or both. ? · You think you may be having side effects from your blood pressure medicine. ? · Your blood pressure is usually normal, but it goes above normal at least 2 times. Where can you learn more? Go to http://salud-susana.info/. Enter M768 in the search box to learn more about \"High Blood Pressure: Care Instructions. \" Current as of: September 21, 2016 Content Version: 11.4 © 4279-5234 [employer]. Care instructions adapted under license by [employer] (which disclaims liability or warranty for this information). If you have questions about a medical condition or this instruction, always ask your healthcare professional. [address] any warranty or liability for your use of this information. Enflick Announcement We are excited to announce that we are making your provider's discharge notes available to you in Enflick. You will see these notes when they are completed and signed by the physician that discharged you from your recent hospital stay. If you have any questions or concerns about any information you see in Enflick, please call the Health Information Department where you were seen or reach out to your Primary Care Provider for more information about your plan of care. Introducing [facility] SERVICES! [facility] introduces [employer] patient portal. Now you can access parts of your medical record, email your doctor's office, and request medication refills online. 1. In your internet browser, go to [email or web address] 2. Click on the First Time User? Click Here link in the Sign In box. You will see the New Member Sign Up page. 3. Enter your Abaxia Access Code exactly as it appears below. You will not need to use this code after youve completed the sign-up process. If you do not sign up before the expiration date, you must request a new code. · Abaxia Access Code: [email or web address] Expires: 9/9/2018 10:08 PM 
 
4. Enter the last four digits of your Social Security Number (xxxx) and Date of Birth (mm/dd/yyyy) as indicated and click Submit. You will be taken to the next sign-up page. 5. Create a Abaxia ID. This will be your Abaxia login ID and cannot be changed, so think of one that is secure and easy to remember. 6. Create a Abaxia password. You can change your password at any time. 7. Enter your Password Reset Question and Answer. This can be used at a later time if you forget your password. 8. Enter your e-mail address. You will receive e-mail notification when new information is available in [address] 9. Click Sign Up. You can now view and download portions of your medical record. 10. Click the Download Summary menu link to download a portable copy of your medical information. If you have questions, please visit the Frequently Asked Questions section of the Abaxia website. Remember, Abaxia is NOT to be used for urgent needs. For medical emergencies, dial 911. Now available from your iPhone and Android! Introducing Konrad Antonio As a [facility] patient, I wanted to make you aware of our electronic visit tool called Konrad Antonio. [facility] 24/7 allows you to connect within minutes with a medical provider 24 hours a day, seven days a week via a mobile device or tablet or logging into a secure website from your computer. You can access [employer] from anywhere in the [place]. A virtual visit might be right for you when you have a simple condition and feel like you just dont want to get out of bed, or cant get away from work for an appointment, when your regular [facility] provider is not available (evenings, weekends or holidays), or when youre out of town and need minor care. Electronic visits cost only $49 and if the [facility] [employer] 24/7 provider determines a prescription is needed to treat your condition, one can be electronically transmitted to a nearby pharmacy*. Please take a moment to enroll today if you have not already done so. The enrollment process is free and takes just a few minutes. To enroll, please download the Finderly/Miscota claudia to your tablet or phone, or visit [email or web address] org to enroll on your computer. And, as an [address] [place] [address] patient with a [employer] account, the results of your visits will be scanned into your electronic medical record and your primary care provider will be able to view the scanned results. We urge you to continue to see your regular [facility] provider for your ongoing medical care. And while your primary care provider may not be the one available when you seek a [employer] virtual visit, the peace of mind you get from getting a real diagnosis real time can be priceless. For more information on [employer], view our Frequently Asked Questions (FAQs) at [email or web address] org. Sincerely, 
 
Didier Stephenson MD 
Chief Medical Officer [facility] Svetlana Lomax *:  certain medications cannot be prescribed via [employer] Unresulted tests-please follow up with your PCP on these results Procedure/Test Authorizing Provider  Ravi Grady MD  
 Marci Rodríguez MD  
 CT HEAD WO CONT Talya Arteaga MD  
 DRUG SCREEN, Darren Mendoza MD  
 EKG, 12 LEAD, Trina Eisenberg MD  
 LACTIC ACID Janese Silber, MD Dimple Rubinstein, MD David Anderson MD Guerry Carton, MD  
 XR CHEST PA LAT Talya Arteaga MD  
  
Providers Seen During Your Hospitalization Provider Specialty Primary office phone Talya Arteaga MD Emergency Medicine [phone] Sam Grayson MD Internal Medicine [phone] Edison Person DO Internal Medicine [phone] Your Primary Care Physician (PCP) Primary Care Physician Office Phone Office Fax Martha Andre [phone] You are allergic to the following No active allergies Recent Documentation Height Weight BMI Smoking Status 1.778 m 87.6 kg 27.71 kg/m2 Former Smoker Emergency Contacts Name Discharge Info Relation Home Work Mobile [address] E CAREGIVER [3] Son [22] [phone] Patient Belongings The following personal items are in your possession at time of discharge: 
     Visual Aid: Glasses, With patient Please provide this summary of care documentation to your next provider. Signatures-by signing, you are acknowledging that this After Visit Summary has been reviewed with you and you have received a copy. Patient Signature:  ____________________________________________________________ Date:  ____________________________________________________________  
  
[facility] Provider Signature:  ____________________________________________________________ Date:  ____________________________________________________________

## 2018-06-11 NOTE — IP AVS SNAPSHOT
[address] [facility] 
[phone] Patient: Claudetta Askew MRN: [id number] A check roberth indicates which time of day the medication should be taken. My Medications START taking these medications Instructions Each Dose to Equal  
 Morning Noon Evening Bedtime  
 aspirin 81 mg chewable tablet Start taking on:  6/14/2018 Replaces:  aspirin 325 mg tablet Your last dose was: Your next dose is: Take 1 Tab by mouth daily. 81 mg CONTINUE taking these medications Instructions Each Dose to Equal  
 Morning Noon Evening Bedtime BENADRYL 25 mg capsule Generic drug:  diphenhydrAMINE Your last dose was: Your next dose is: Take 25 mg by mouth every six (6) hours as needed for Sleep. 25 mg  
    
   
   
   
  
 glimepiride 2 mg tablet Commonly known as:  AMARYL Your last dose was: Your next dose is: Take 2 mg by mouth every morning. 2 mg  
    
   
   
   
  
 lisinopril 40 mg tablet Commonly known as:  Tildon Alexus Your last dose was: Your next dose is: LOPRESSOR 50 mg tablet Generic drug:  metoprolol tartrate Your last dose was: Your next dose is: Take 50 mg by mouth two (2) times a day. 50 mg  
    
   
   
   
  
 pantoprazole 40 mg tablet Commonly known as:  PROTONIX Your last dose was: Your next dose is: Take 1 Tab by mouth daily. 40 mg  
    
   
   
   
  
 simvastatin 40 mg tablet Commonly known as:  ZOCOR Your last dose was: Your next dose is: Take 40 mg by mouth nightly. 40 mg  
    
   
   
   
  
  
STOP taking these medications   
 aspirin 325 mg tablet Commonly known as:  ASPIRIN Replaced by:  aspirin 81 mg chewable tablet naproxen 500 mg tablet Commonly known as:  NAPROSYN Where to Get Your Medications These medications were sent to  [facility], [address]  [address] [facility], [address] [place] [address] Phone:  [phone]  
  aspirin 81 mg chewable tablet

## 2018-06-12 PROBLEM — R41.82 ALTERED MENTAL STATUS: Status: ACTIVE | Noted: 2018-06-12

## 2018-06-12 LAB
AMPHET UR QL SCN: NEGATIVE
ATRIAL RATE: 61 BPM
BARBITURATES UR QL SCN: NEGATIVE
BENZODIAZ UR QL: NEGATIVE
CALCULATED P AXIS, ECG09: 67 DEGREES
CALCULATED R AXIS, ECG10: 44 DEGREES
CALCULATED T AXIS, ECG11: 52 DEGREES
CANNABINOIDS UR QL SCN: NEGATIVE
COCAINE UR QL SCN: NEGATIVE
DIAGNOSIS, 93000: NORMAL
DRUG SCRN COMMENT,DRGCM: NORMAL
GLUCOSE BLD STRIP.AUTO-MCNC: 169 MG/DL (ref 65–100)
GLUCOSE BLD STRIP.AUTO-MCNC: 211 MG/DL (ref 65–100)
GLUCOSE BLD STRIP.AUTO-MCNC: 268 MG/DL (ref 65–100)
MAGNESIUM SERPL-MCNC: 2 MG/DL (ref 1.6–2.4)
METHADONE UR QL: NEGATIVE
OPIATES UR QL: NEGATIVE
P-R INTERVAL, ECG05: 242 MS
PCP UR QL: NEGATIVE
Q-T INTERVAL, ECG07: 476 MS
QRS DURATION, ECG06: 98 MS
QTC CALCULATION (BEZET), ECG08: 479 MS
SERVICE CMNT-IMP: ABNORMAL
VENTRICULAR RATE, ECG03: 61 BPM

## 2018-06-12 PROCEDURE — 74011250637 HC RX REV CODE- 250/637: Performed by: EMERGENCY MEDICINE

## 2018-06-12 PROCEDURE — 99218 HC RM OBSERVATION: CPT

## 2018-06-12 PROCEDURE — 96372 THER/PROPH/DIAG INJ SC/IM: CPT

## 2018-06-12 PROCEDURE — 74011250636 HC RX REV CODE- 250/636: Performed by: INTERNAL MEDICINE

## 2018-06-12 PROCEDURE — 82962 GLUCOSE BLOOD TEST: CPT

## 2018-06-12 PROCEDURE — 74011636637 HC RX REV CODE- 636/637: Performed by: INTERNAL MEDICINE

## 2018-06-12 PROCEDURE — 36415 COLL VENOUS BLD VENIPUNCTURE: CPT | Performed by: INTERNAL MEDICINE

## 2018-06-12 PROCEDURE — 74011250637 HC RX REV CODE- 250/637: Performed by: INTERNAL MEDICINE

## 2018-06-12 PROCEDURE — 83735 ASSAY OF MAGNESIUM: CPT | Performed by: INTERNAL MEDICINE

## 2018-06-12 PROCEDURE — 95816 EEG AWAKE AND DROWSY: CPT | Performed by: PSYCHIATRY & NEUROLOGY

## 2018-06-12 RX ORDER — SODIUM CHLORIDE 0.9 % (FLUSH) 0.9 %
5-10 SYRINGE (ML) INJECTION EVERY 8 HOURS
Status: DISCONTINUED | OUTPATIENT
Start: 2018-06-12 | End: 2018-06-13 | Stop reason: HOSPADM

## 2018-06-12 RX ORDER — DEXTROSE 50 % IN WATER (D50W) INTRAVENOUS SYRINGE
12.5-25 AS NEEDED
Status: DISCONTINUED | OUTPATIENT
Start: 2018-06-12 | End: 2018-06-13 | Stop reason: HOSPADM

## 2018-06-12 RX ORDER — PANTOPRAZOLE SODIUM 40 MG/1
40 TABLET, DELAYED RELEASE ORAL DAILY
Status: DISCONTINUED | OUTPATIENT
Start: 2018-06-12 | End: 2018-06-13 | Stop reason: HOSPADM

## 2018-06-12 RX ORDER — HEPARIN SODIUM 5000 [USP'U]/ML
5000 INJECTION, SOLUTION INTRAVENOUS; SUBCUTANEOUS EVERY 8 HOURS
Status: DISCONTINUED | OUTPATIENT
Start: 2018-06-12 | End: 2018-06-13 | Stop reason: HOSPADM

## 2018-06-12 RX ORDER — ASPIRIN 325 MG
81 TABLET ORAL DAILY
Status: DISCONTINUED | OUTPATIENT
Start: 2018-06-12 | End: 2018-06-12

## 2018-06-12 RX ORDER — GUAIFENESIN 100 MG/5ML
81 LIQUID (ML) ORAL DAILY
Status: DISCONTINUED | OUTPATIENT
Start: 2018-06-12 | End: 2018-06-13 | Stop reason: HOSPADM

## 2018-06-12 RX ORDER — INSULIN LISPRO 100 [IU]/ML
INJECTION, SOLUTION INTRAVENOUS; SUBCUTANEOUS
Status: DISCONTINUED | OUTPATIENT
Start: 2018-06-12 | End: 2018-06-13 | Stop reason: HOSPADM

## 2018-06-12 RX ORDER — METOPROLOL TARTRATE 50 MG/1
50 TABLET ORAL 2 TIMES DAILY
Status: DISCONTINUED | OUTPATIENT
Start: 2018-06-12 | End: 2018-06-13 | Stop reason: HOSPADM

## 2018-06-12 RX ORDER — HYDRALAZINE HYDROCHLORIDE 20 MG/ML
10 INJECTION INTRAMUSCULAR; INTRAVENOUS
Status: DISCONTINUED | OUTPATIENT
Start: 2018-06-12 | End: 2018-06-13 | Stop reason: HOSPADM

## 2018-06-12 RX ORDER — MAGNESIUM SULFATE 100 %
4 CRYSTALS MISCELLANEOUS AS NEEDED
Status: DISCONTINUED | OUTPATIENT
Start: 2018-06-12 | End: 2018-06-13 | Stop reason: HOSPADM

## 2018-06-12 RX ORDER — PRAVASTATIN SODIUM 40 MG/1
80 TABLET ORAL
Status: DISCONTINUED | OUTPATIENT
Start: 2018-06-12 | End: 2018-06-13 | Stop reason: HOSPADM

## 2018-06-12 RX ORDER — SODIUM CHLORIDE 0.9 % (FLUSH) 0.9 %
5-10 SYRINGE (ML) INJECTION AS NEEDED
Status: DISCONTINUED | OUTPATIENT
Start: 2018-06-12 | End: 2018-06-13 | Stop reason: HOSPADM

## 2018-06-12 RX ADMIN — INSULIN LISPRO 3 UNITS: 100 INJECTION, SOLUTION INTRAVENOUS; SUBCUTANEOUS at 22:42

## 2018-06-12 RX ADMIN — Medication 10 ML: at 22:31

## 2018-06-12 RX ADMIN — Medication 10 ML: at 14:55

## 2018-06-12 RX ADMIN — METOPROLOL TARTRATE 50 MG: 50 TABLET ORAL at 17:24

## 2018-06-12 RX ADMIN — Medication 10 ML: at 09:44

## 2018-06-12 RX ADMIN — INSULIN LISPRO 2 UNITS: 100 INJECTION, SOLUTION INTRAVENOUS; SUBCUTANEOUS at 17:24

## 2018-06-12 RX ADMIN — INSULIN LISPRO 3 UNITS: 100 INJECTION, SOLUTION INTRAVENOUS; SUBCUTANEOUS at 11:44

## 2018-06-12 RX ADMIN — PRAVASTATIN SODIUM 80 MG: 40 TABLET ORAL at 22:31

## 2018-06-12 RX ADMIN — HEPARIN SODIUM 5000 UNITS: 5000 INJECTION, SOLUTION INTRAVENOUS; SUBCUTANEOUS at 06:46

## 2018-06-12 RX ADMIN — ASPIRIN 81 MG 81 MG: 81 TABLET ORAL at 09:42

## 2018-06-12 RX ADMIN — METOPROLOL TARTRATE 50 MG: 50 TABLET ORAL at 09:42

## 2018-06-12 RX ADMIN — HEPARIN SODIUM 5000 UNITS: 5000 INJECTION, SOLUTION INTRAVENOUS; SUBCUTANEOUS at 22:31

## 2018-06-12 RX ADMIN — PANTOPRAZOLE SODIUM 40 MG: 40 TABLET, DELAYED RELEASE ORAL at 09:42

## 2018-06-12 RX ADMIN — HEPARIN SODIUM 5000 UNITS: 5000 INJECTION, SOLUTION INTRAVENOUS; SUBCUTANEOUS at 14:55

## 2018-06-12 NOTE — ED NOTES
Pt is resting comfortably in bed - sleeping with visible chest rise and fall - family friend remains at bedside. Denies any needs at time.  Will continue to monitor

## 2018-06-12 NOTE — CONSULTS
IP CONSULT TO NEUROLOGY  Consult performed by: Cass Cummins  Consult ordered by: Eleanor Clinton            NEUROLOGY CONSULT    NAME Mario Duarte AGE [de-identified] y.o. MRN [id number]  1940     REQUESTING PHYSICIAN: Boy Sousa MD;Riley*      CHIEF COMPLAINT:  confusion     This is a [de-identified] y.o. male with a medical history of pituitary adenoma is admitted after experiencing a brief confusional period. His son was concerned and sought medical attention. He is back to baseline now. No convulsions or abnormal movements noted. Patient characterizes the event as a lapse in memory      ASSESSMENT AND PLAN     1. Disorientation  EEG  If normal discharge and follow up in clinic in 2 months    2. Unsteady gait  Physical therapy  Fall precautions    3. Tobacco abuse  Advised on the dangers of tobacco abuse  Advised on the benefits of discontinuation   Advised on available treatments. 4. History of duodenal cancer    5. Hyperlipidemia  Continue pravastatin    6. Hypertension  Continue lipitor  Aspirin 81mg    ALLERGIES:  Review of patient's allergies indicates no known allergies.      Current Facility-Administered Medications   Medication Dose Route Frequency    sodium chloride (NS) flush 5-10 mL  5-10 mL IntraVENous Q8H    sodium chloride (NS) flush 5-10 mL  5-10 mL IntraVENous PRN    heparin (porcine) injection 5,000 Units  5,000 Units SubCUTAneous Q8H    metoprolol tartrate (LOPRESSOR) tablet 50 mg  50 mg Oral BID    pravastatin (PRAVACHOL) tablet 80 mg  80 mg Oral QHS    pantoprazole (PROTONIX) tablet 40 mg  40 mg Oral DAILY    aspirin chewable tablet 81 mg  81 mg Oral DAILY    hydrALAZINE (APRESOLINE) 20 mg/mL injection 10 mg  10 mg IntraVENous Q2H PRN    glucose chewable tablet 16 g  4 Tab Oral PRN    dextrose (D50W) injection syrg 12.5-25 g  12.5-25 g IntraVENous PRN    glucagon (GLUCAGEN) injection 1 mg  1 mg IntraMUSCular PRN    insulin lispro (HUMALOG) injection   SubCUTAneous AC&HS Past Medical History:   Diagnosis Date    Aneurysm ([facility] [address]     aortic    Arrhythmia     A-fib    Calculus of kidney     Cancer ([facility] [address]     Chronic kidney disease     stone    Diabetes ([facility] [address]     Fast heart beat     Former tobacco use     Hypertension     Muscle weakness     Prostate disorder     Stomach pain        Social History   Substance Use Topics    Smoking status: Former Smoker     Packs/day: 0.00     Years: 60.00    Smokeless tobacco: Never Used      Comment: pipe and cigar use currently    Alcohol use 0.0 oz/week      Comment: 1 shot per month       Family History   Problem Relation Age of Onset    Cancer Mother     Heart Disease Mother     Stroke Father     Cancer Brother     Diabetes Brother     Heart Disease Brother      Review of Systems   Constitutional: Positive for malaise/fatigue. Negative for chills and fever. HENT: Negative for ear pain. Eyes: Negative for pain and discharge. Respiratory: Negative for cough and hemoptysis. Cardiovascular: Negative for chest pain and claudication. Gastrointestinal: Negative for constipation and diarrhea. Genitourinary: Negative for flank pain and hematuria. Musculoskeletal: Positive for joint pain and myalgias. Negative for back pain. Skin: Negative for itching and rash. Neurological: Negative for sensory change and headaches. Endo/Heme/Allergies: Negative for environmental allergies. Does not bruise/bleed easily. Psychiatric/Behavioral: Negative for depression and hallucinations. Visit Vitals    /90    Pulse (!) 59    Temp 97.6 °F (36.4 °C)    Resp 14    Ht 5' 10\" (1.778 m)    Wt 193 lb 2 oz (87.6 kg)    SpO2 100%    BMI 27.71 kg/m2      General: Well developed, well nourished.  Patient in no apparent distress   Head: Normocephalic, atraumatic, anicteric sclera   Neck Normal ROM, No thyromegally   Lungs:  Clear to auscultation bilaterally, No wheezes or rubs   Cardiac: Regular rate and rhythm with no murmurs. Abd: Bowel sounds were audible. No tenderness on palpation   Ext: No pedal edema   Skin: Supple no rash     NeurologicExam:  Mental Status: Alert and oriented to person place and time   Speech: Fluent no aphasia or dysarthria. Cranial Nerves:  II - XII Intact   Motor:  Full and symmetric strength of upper and lower proximal and distal muscles. Normal bulk and tone. Reflexes:   Deep tendon reflexes 1+/4 and symmetric. Sensory:   Symmetric and intact with no perceived deficits modalities involving small or large fibers. Gait:  Gait is unsteady and wide based    Tremor:   No tremor noted. Cerebellar:  Coordination intact. Neurovascular: No carotid bruits. No JVD       REVIEWED IMAGING:    MRI :    Results from Hospital Encounter encounter on 09/18/17   MRI BRAIN W WO CONT   Narrative INDICATION:  pitutary mass with occipital infarctions. EXAMINATION:  MRI BRAIN, PITUITARY, W/WO CONTRAST    COMPARISON:  None    TECHNIQUE:  MR imaging of the brain was performed with particular attention to  the pituitary gland including sagittal T1, axial T1, T2, FLAIR, DWI/ADC; Pre and  post contrast dynamic imaging was performed using 15 mL of gadolinium with  particular attention to the sella turcica. FINDINGS:      There is a large, homogeneously enhancing mass centered in the sella turcica  extending into the suprasellar cistern which measures 2.0 x 1.6 x 1.6 cm. This  displaces the infundibulum and optic chiasm superiorly. This is adjacent to the  cavernous carotid and supraclinoid internal carotid artery segments although  they are patent. There is possible extension toward the superior aspect of the  left cavernous sinus. The ventricles are midline without hydrocephalus. There is  moderate chronic periventricular white matter disease, which also involves the  verena. Probable remote right occipital infarction. There is no acute infarction  or intracranial hemorrhage.  Major intracranial vascular flow-voids are patent. 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 Possible left cavernous sinus  invasion. 2. Chronic white matter disease and ischemic changes. No acute infarction. CT:    Results from Hospital Encounter encounter on 06/11/18   CT HEAD WO CONT   Narrative EXAM:  CT HEAD WO CONT    INDICATION:   Vertigo, episodic, peripheral; dizzy/ weakness    COMPARISON: September 19, 2017. CONTRAST:  None. TECHNIQUE: Unenhanced CT of the head was performed using 5 mm images. Brain and  bone windows were generated. CT dose reduction was achieved through use of a  standardized protocol tailored for this examination and automatic exposure  control for dose modulation. FINDINGS:  The ventricles and sulci are stable in size, shape and configuration and  midline. There is unchanged periventricular white matter disease. Encephalomalacia in the bilateral occipital lobes is unchanged. 1.6 cm  sellar/suprasellar mass is unchanged. There is no intracranial hemorrhage,  extra-axial collection, mass effect or midline shift. The basilar cisterns are  open. No acute infarct is identified. The bone windows demonstrate no  abnormalities. The visualized portions of the paranasal sinuses and mastoid air  cells are clear. 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 Unchanged periventricular  white matter disease, compatible with chronic small vessel ischemia. Unchanged  sellar/suprasellar mass. No evidence of acute infarct or intracranial  hemorrhage.             REVIEWED LABS:  Lab Results   Component Value Date/Time    WBC 5.9 06/11/2018 11:11 PM    HCT 44.0 06/11/2018 11:11 PM    HGB 14.7 06/11/2018 11:11 PM    PLATELET 973 83/04/5823 11:11 PM     Lab Results   Component Value Date/Time    Sodium 137 06/11/2018 11:11 PM    Potassium 4.2 06/11/2018 11:11 PM    Chloride 101 06/11/2018 11:11 PM    CO2 27 06/11/2018 11:11 PM    Glucose 108 (H) 06/11/2018 11:11 PM    BUN 24 (H) 06/11/2018 11:11 PM    Creatinine 1.50 (H) 06/11/2018 11:11 PM    Calcium 9.4 06/11/2018 11:11 PM       Lab Results   Component Value Date/Time    LDL, calculated 68.8 09/22/2017 01:47 AM     Lab Results   Component Value Date/Time    Hemoglobin A1c 5.8 01/14/2015 04:38 AM

## 2018-06-12 NOTE — ED PROVIDER NOTES
EMERGENCY DEPARTMENT HISTORY AND PHYSICAL EXAM    Date: 6/11/2018  Patient Name: Ariana Paredes    History of Presenting Illness     Chief Complaint   Patient presents with    Altered mental status     patients son states that the patient called him earlier today and stated that he was confused       History Provided By: Patient and Family    HPI: Ariana Paredes is a [de-identified] y.o. male, pmhx HTN, DM, Afib, who presents ambulatory with family to the ED c/o persistent confusion [phone] upon awaking from nap. Pt's Friend at bedside states the pt had called him shortly after awaking, concerned because he was unsure of what day and time it was. He notes the pt was also complaining of associated generalized weakness. The pt then called his Son, complaining of the same sxs, who brought him to the ED. 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 Pt denies any hx of similar sxs in the past. Pt specifically denies any fever, congestion, cough, shortness of breath, chest pain, abdominal pain, nausea, vomiting, diarrhea, dysuria, or urinary frequency. PCP: Lion Wilson MD   Oncology: Bambi Way MD  Neurosurgery: Ava Covington MD    PMHx: Significant for HTN, DM, AAA, Kidney stones, Afib, Carcinoma of duodenum   PSHx: Significant for Orthopedic, Endoscopy, whipple   Social Hx: +tobacco, -EtOH, -Illicit Drugs    There are no other complaints, changes, or physical findings at this time. Current Outpatient Prescriptions   Medication Sig Dispense Refill    aspirin (ASPIRIN) 325 mg tablet Take 81 mg by mouth daily.  lisinopril (PRINIVIL, ZESTRIL) 40 mg tablet       naproxen (NAPROSYN) 500 mg tablet       metoprolol tartrate (LOPRESSOR) 50 mg tablet Take 50 mg by mouth two (2) times a day.  glimepiride (AMARYL) 2 mg tablet Take 2 mg by mouth every morning.       diphenhydrAMINE (BENADRYL) 25 mg capsule Take 25 mg by mouth every six (6) hours as needed for Sleep.  simvastatin (ZOCOR) 40 mg tablet Take 40 mg by mouth nightly.  pantoprazole (PROTONIX) 40 mg tablet Take 1 Tab by mouth daily. 30 Tab 0       Past History     Past Medical History:  Past Medical History:   Diagnosis Date    Aneurysm ([facility] [address]     aortic    Arrhythmia     A-fib    Calculus of kidney     Cancer ([facility] [address]     Chronic kidney disease     stone    Diabetes ([facility] [address]     Fast heart beat     Former tobacco use     Hypertension     Muscle weakness     Prostate disorder     Stomach pain        Past Surgical History:  Past Surgical History:   Procedure Laterality Date    ABDOMEN SURGERY PROC UNLISTED      Whipple Procedure    HX ORTHOPAEDIC      neck surgery    HX OTHER SURGICAL  1/6/2015    Whipple procedure, G tube, J tube for duodenal cancer    UPPER GI ENDOSCOPY,BALL DIL,30MM  12/2/2014         UPPER GI ENDOSCOPY,BIOPSY  11/26/2014         UPPER GI ENDOSCOPY,BIOPSY  12/2/2014         UPPER GI ENDOSCOPY,DIAGNOSIS  1/23/2015            Family History:  Family History   Problem Relation Age of Onset    Cancer Mother     Heart Disease Mother     Stroke Father     Cancer Brother     Diabetes Brother     Heart Disease Brother        Social History:  Social History   Substance Use Topics    Smoking status: Former Smoker     Packs/day: 0.00     Years: 60.00    Smokeless tobacco: Never Used      Comment: pipe and cigar use currently    Alcohol use 0.0 oz/week      Comment: 1 shot per month       Allergies:  No Known Allergies      Review of Systems   Review of Systems   Constitutional: Negative for chills and fever. HENT: Negative. Eyes: Negative. Respiratory: Negative for cough, chest tightness and shortness of breath. Cardiovascular: Negative for chest pain and leg swelling. Gastrointestinal: Negative for abdominal pain, diarrhea, nausea and vomiting. Endocrine: Negative. Genitourinary: Negative for difficulty urinating and dysuria. Musculoskeletal: Negative for myalgias. Skin: Negative. Neurological: Positive for weakness (generalized). Psychiatric/Behavioral: Positive for confusion. All other systems reviewed and are negative. Physical Exam   Physical Exam   Constitutional: He appears well-developed and well-nourished. No distress. HENT:   Head: Normocephalic and atraumatic. Nose: Nose normal.   Mouth/Throat: No oropharyngeal exudate. Eyes: Conjunctivae and EOM are normal. Pupils are equal, round, and reactive to light. Neck: Normal range of motion. Neck supple. No JVD present. Cardiovascular: Normal rate, regular rhythm, normal heart sounds and intact distal pulses. Exam reveals no friction rub. No murmur heard. Pulmonary/Chest: Effort normal and breath sounds normal. No stridor. No respiratory distress. He has no wheezes. He has no rales. Abdominal: Soft. Bowel sounds are normal. He exhibits no distension. There is no tenderness. There is no rebound. Musculoskeletal: Normal range of motion. He exhibits no tenderness. Neurological: He is alert. He has normal strength. He is disoriented. No cranial nerve deficit or sensory deficit. Coordination and gait normal. GCS eye subscore is 4. GCS verbal subscore is 4. GCS motor subscore is 6. Skin: Skin is warm and dry. No rash noted. He is not diaphoretic. Psychiatric: He has a normal mood and affect. His speech is normal and behavior is normal. Judgment and thought content normal. Cognition and memory are normal.   Nursing note and vitals reviewed. Diagnostic Study Results     Labs -     Recent Results (from the past 12 hour(s))   CBC WITH AUTOMATED DIFF    Collection Time: 06/11/18 11:11 PM   Result Value Ref Range    WBC 5.9 4.1 - 11.1 K/uL    RBC 4.80 4. 10 - 5.70 M/uL    HGB 14.7 12.1 - 17.0 g/dL    HCT 44.0 36.6 - 50.3 %    MCV 91.7 80.0 - 99.0 FL    MCH 30.6 26.0 - 34.0 PG    MCHC 33.4 30.0 - 36.5 g/dL    RDW 13.2 11.5 - 14.5 %    PLATELET 989 165 - 400 K/uL    MPV 10.5 8.9 - 12.9 FL    NRBC 0.0 0  WBC    ABSOLUTE NRBC 0.00 0.00 - 0.01 K/uL    NEUTROPHILS 37 32 - 75 %    LYMPHOCYTES 52 (H) 12 - 49 %    MONOCYTES 9 5 - 13 %    EOSINOPHILS 2 0 - 7 %    BASOPHILS 1 0 - 1 %    IMMATURE GRANULOCYTES 0 0.0 - 0.5 %    ABS. NEUTROPHILS 2.2 1.8 - 8.0 K/UL    ABS. LYMPHOCYTES 3.1 0.8 - 3.5 K/UL    ABS. MONOCYTES 0.5 0.0 - 1.0 K/UL    ABS. EOSINOPHILS 0.1 0.0 - 0.4 K/UL    ABS. BASOPHILS 0.0 0.0 - 0.1 K/UL    ABS. IMM. GRANS. 0.0 0.00 - 0.04 K/UL    DF AUTOMATED     METABOLIC PANEL, COMPREHENSIVE    Collection Time: 06/11/18 11:11 PM   Result Value Ref Range    Sodium 137 136 - 145 mmol/L    Potassium 4.2 3.5 - 5.1 mmol/L    Chloride 101 97 - 108 mmol/L    CO2 27 21 - 32 mmol/L    Anion gap 9 5 - 15 mmol/L    Glucose 108 (H) 65 - 100 mg/dL    BUN 24 (H) 6 - 20 MG/DL    Creatinine 1.50 (H) 0.70 - 1.30 MG/DL    BUN/Creatinine ratio 16 12 - 20      GFR est AA 55 (L) >60 ml/min/1.73m2    GFR est non-AA 45 (L) >60 ml/min/1.73m2    Calcium 9.4 8.5 - 10.1 MG/DL    Bilirubin, total 0.3 0.2 - 1.0 MG/DL    ALT (SGPT) 22 12 - 78 U/L    AST (SGOT) 17 15 - 37 U/L    Alk.  phosphatase 95 45 - 117 U/L    Protein, total 8.6 (H) 6.4 - 8.2 g/dL    Albumin 4.2 3.5 - 5.0 g/dL    Globulin 4.4 (H) 2.0 - 4.0 g/dL    A-G Ratio 1.0 (L) 1.1 - 2.2     CK W/ CKMB & INDEX    Collection Time: 06/11/18 11:11 PM   Result Value Ref Range     39 - 308 U/L    CK - MB 1.0 <3.6 NG/ML    CK-MB Index 1.0 0 - 2.5     TROPONIN I    Collection Time: 06/11/18 11:11 PM   Result Value Ref Range    Troponin-I, Qt. <0.04 <0.05 ng/mL   URINALYSIS W/ REFLEX CULTURE    Collection Time: 06/11/18 11:11 PM   Result Value Ref Range    Color YELLOW/STRAW      Appearance CLEAR CLEAR      Specific gravity 1.017 1.003 - 1.030      pH (UA) 6.5 5.0 - 8.0      Protein 100 (A) NEG mg/dL    Glucose 100 (A) NEG mg/dL    Ketone NEGATIVE  NEG mg/dL    Bilirubin NEGATIVE  NEG      Blood NEGATIVE  NEG      Urobilinogen 0.2 0.2 - 1.0 EU/dL    Nitrites NEGATIVE  NEG      Leukocyte Esterase NEGATIVE  NEG      WBC 0-4 0 - 4 /hpf    RBC 0-5 0 - 5 /hpf    Epithelial cells FEW FEW /lpf    Bacteria NEGATIVE  NEG /hpf    UA:UC IF INDICATED CULTURE NOT INDICATED BY UA RESULT CNI      Hyaline cast 0-2 0 - 5 /lpf   DRUG SCREEN, URINE    Collection Time: 06/11/18 11:11 PM   Result Value Ref Range    AMPHETAMINES NEGATIVE  NEG      BARBITURATES NEGATIVE  NEG      BENZODIAZEPINES NEGATIVE  NEG      COCAINE NEGATIVE  NEG      METHADONE NEGATIVE  NEG      OPIATES NEGATIVE  NEG      PCP(PHENCYCLIDINE) NEGATIVE  NEG      THC (TH-CANNABINOL) NEGATIVE  NEG      Drug screen comment (NOTE)    LACTIC ACID    Collection Time: 06/11/18 11:11 PM   Result Value Ref Range    Lactic acid 1.3 0.4 - 2.0 MMOL/L   EKG, 12 LEAD, INITIAL    Collection Time: 06/11/18 11:36 PM   Result Value Ref Range    Ventricular Rate 61 BPM    Atrial Rate 61 BPM    P-R Interval 242 ms    QRS Duration 98 ms    Q-T Interval 476 ms    QTC Calculation (Bezet) 479 ms    Calculated P Axis 67 degrees    Calculated R Axis 44 degrees    Calculated T Axis 52 degrees    Diagnosis       Sinus rhythm with 1st degree AV block  When compared with ECG of 03-MAR-2018 17:50,  No significant change was found         Radiologic Studies -   XR CHEST PA LAT   Final Result      CT HEAD WO CONT   Final Result        CT Results  (Last 48 hours)               06/11/18 2240  CT HEAD WO CONT Final result    Impression:  IMPRESSION: 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 Unchanged periventricular   white matter disease, compatible with chronic small vessel ischemia. Unchanged   sellar/suprasellar mass. No evidence of acute infarct or intracranial   hemorrhage. Narrative:  EXAM:  CT HEAD WO CONT       INDICATION:   Vertigo, episodic, peripheral; dizzy/ weakness       COMPARISON: September 19, 2017. CONTRAST:  None. TECHNIQUE: Unenhanced CT of the head was performed using 5 mm images. Brain and   bone windows were generated. CT dose reduction was achieved through use of a   standardized protocol tailored for this examination and automatic exposure   control for dose modulation. FINDINGS:   The ventricles and sulci are stable in size, shape and configuration and   midline. There is unchanged periventricular white matter disease. Encephalomalacia in the bilateral occipital lobes is unchanged. 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 There is no intracranial hemorrhage,   extra-axial collection, mass effect or midline shift. The basilar cisterns are   open. No acute infarct is identified. The bone windows demonstrate no   abnormalities. The visualized portions of the paranasal sinuses and mastoid air   cells are clear. CXR Results  (Last 48 hours)               06/11/18 2332  XR CHEST PA LAT Final result    Impression:  IMPRESSION: No acute process. Narrative:  INDICATION: Dizzy. Weakness. COMPARISON: December 1, 2017       FINDINGS: PA and lateral views of the chest demonstrate a stable   cardiomediastinal silhouette and clear lungs bilaterally. The thoracic aorta   remains mildly tortuous and atherosclerotic. Degenerative changes are present in   the thoracic spine. Medical Decision Making   I am the first provider for this patient. I reviewed the vital signs, available nursing notes, past medical history, past surgical history, family history and social history. Vital Signs-Reviewed the patient's vital signs.   Patient Vitals for the past 12 hrs:   Pulse Resp BP SpO2   06/12/18 0330 62 15 (!) 186/93 97 %   06/12/18 0315 (!) 58 12 (!) 178/97 95 %   06/12/18 0300 60 (!) 5 (!) 174/92 94 %   06/12/18 0245 61 18 165/67 98 %   06/12/18 0230 62 14 (!) 178/94 96 %   06/12/18 0215 (!) 59 13 (!) 162/93 96 %   06/12/18 0200 63 15 (!) 166/94 99 %   06/12/18 0145 (!) 59 (!) 0 (!) 171/92 97 %   06/12/18 0130 (!) 58 13 (!) 172/96 99 % 06/12/18 0115 (!) 58 17 (!) 160/107 99 %   06/12/18 0100 61 18 168/83 98 %   06/12/18 0045 (!) 57 14 (!) 176/131 99 %   06/12/18 0030 62 17 173/80 99 %   06/12/18 0015 (!) 59 16 (!) 161/91 96 %   06/11/18 2345 61 15 157/86 100 %   06/11/18 2332 60 13 - 99 %   06/11/18 2331 - - 172/88 -   06/11/18 2209 63 19 (!) 202/106 100 %       Pulse Oximetry Analysis - 100% on RA    Cardiac Monitor:   Rate: 63 bpm  Rhythm: Normal Sinus Rhythm      Records Reviewed: Nursing Notes, Old Medical Records and Previous Laboratory Studies    Provider Notes (Medical Decision Making):     DDX:  Narcisa Lion, intracranial mass/mets, uti, dehydration, electrolyte abnormality, arrhythmia    Plan:  Head ct, Labs, ua, cxr, ekg    Impression:  Eliza Coelho    ED Course:   Initial assessment performed. The patients presenting problems have been discussed, and they are in agreement with the care plan formulated and outlined with them. I have encouraged them to ask questions as they arise throughout their visit. I reviewed our electronic medical record system for any past medical records that were available that may contribute to the patients current condition, the nursing notes and and vital signs from today's visit    Nursing notes will be reviewed as they become available in realtime while the pt has been in the ED. Steffanie Singh MD    I have spent 3-7 minutes discussing the medical risks of prolonged smoking habits and advised the patient of the benefits of the cessation of smoking, providing specific suggestions on how to quit. Steffanie Singh MD    EKG interpretation 3299: NSR, nl Axis, rate 61; , QRS 98, QTc 479; no acute ischemia; Steffanie Singh MD    I personally reviewed pt's imaging. Official read by radiology listed above. Steffanie Singh MD    CONSULT NOTE:   2:00 Ann De Jesus MD spoke with Dr. Lieutenant Calero,   Specialty: Hospitalist  Discussed pt's hx, disposition, and available diagnostic and imaging results.  Reviewed care plans. Consultant will evaluate pt for admission. Written by JENNIFER Frazieribmiguel angel, as dictated by Luisa Schmitt MD.    PROGRESS NOTE  2:00 AM  Pt reevaluated. Pt's Friend updated on resulted labs and current plan of care, including admission. Pt expressed understanding of current plan. Written by JENNIFER Frazier, as dictated by Luisa Schmitt MD      Critical Care Time:     none    PLAN:  1. Admission to the hospital     Disposition:    Admit Note:  2:01 AM  Pt is being admitted by Dr. Cem Farmer. The results of their tests and reason(s) for their admission have been discussed with pt and/or available family. They convey agreement and understanding for the need to be admitted and for admission diagnosis. Diagnosis     Clinical Impression:   1. Disorientation        Attestations: This note is prepared by Jean Paul Del Cid, acting as Scribe for MD Luisa Starks MD : The scribe's documentation has been prepared under my direction and personally reviewed by me in its entirety. I confirm that the note above accurately reflects all work, treatment, procedures, and medical decision making performed by me. This note will not be viewable in [address]

## 2018-06-12 NOTE — H&P
Hospitalist Admission Note    NAME: Whitney Sanchez   :  1940   MRN:  [id number]     Date/Time:  2018 5:41 AM    Patient PCP: Kirk Cobb MD  ______________________________________________________________________  Given the patient's current clinical presentation, I have a high level of concern for decompensation if discharged from the emergency department. Complex decision making was performed, which includes reviewing the patient's available past medical records, laboratory results, and x-ray films. My assessment of this patient's clinical condition and my plan of care is as follows. 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 Unchanged periventricular  white matter disease, compatible with chronic small vessel ischemia. Unchanged  sellar/suprasellar mass.  No evidence of acute infarct or intracranial  Hemorrhage.  -no fevers  -Pt poor historian, family (son) unavailabe  -Per ED physician report is significant change in memory acutely (24hrs)  -neuro consult  -defer to neuro on benefit of further imaging    Hypertensive Urgency  -IV hydralazine  -cont metoprolol    Hx CVA  Hx pituitary microadenoma  -cont asa, statin  -findings as above on CT    Afib  -cont metoprolol  -on asa only, CHADs2 score at least 3  -HAS-BLED score of 4 with risk of significant bleeding 8.7/100  -hold off on AC at this time, obtain further history    DM2  -on oral hypoglycemics  -SSI and POCs while in hospital    HTN  HLD  -pravastatin, metoprolol  -add prn hydralazine    Adenocarcinoma of the duodenum  -stage IIIA  -completed 1 cycle chemo, aborted d/t toxicity  -s/p pancreato-duodenectomy   -has been in remission, follows w Dr Rosamaria Zhang      Code Status: Full  Surrogate Decision Maker: Son    DVT Prophylaxis: Heparin sq  GI Prophylaxis: not indicated    Baseline: unknown      Subjective:   CHIEF COMPLAINT: AMS/confusion    HISTORY OF PRESENT ILLNESS:     Marina Rios is a [de-identified] y.o.  male presenting with altered sensorium. The patient is unable to provide meaningful history and no family or contacts could be reached for further history. The history is therefore derived from the medical record and discussion with the ED physician. Per report, the patient became confused last night after awakening from a nap. He called his son stating that he felt weak and the patient was brought to the ED by his son, who found him to be acutely confused with significant new-onset memory changes. CT of the head revealed findings consistent with small vessel white matter disease and redemonstrated sellar/suprasellar mass seen on previous studies in the context of known diagnosis of pituitary microadenoma. We were asked to admit for work up and evaluation of the above problems.      Past Medical History:   Diagnosis Date    Aneurysm ([address]     aortic    Arrhythmia     A-fib    Calculus of kidney     Cancer ([address]     Chronic kidney disease     stone    Diabetes ([address]     Fast heart beat     Former tobacco use     Hypertension     Muscle weakness     Prostate disorder     Stomach pain         Past Surgical History:   Procedure Laterality Date    ABDOMEN SURGERY PROC UNLISTED      Whipple Procedure    HX ORTHOPAEDIC      neck surgery    HX OTHER SURGICAL  1/6/2015    Whipple procedure, G tube, J tube for duodenal cancer    UPPER GI ENDOSCOPY,BALL DIL,30MM  12/2/2014         UPPER GI ENDOSCOPY,BIOPSY  11/26/2014         UPPER GI ENDOSCOPY,BIOPSY  12/2/2014         UPPER GI ENDOSCOPY,DIAGNOSIS  1/23/2015            Social History   Substance Use Topics    Smoking status: Former Smoker     Packs/day: 0.00     Years: 60.00    Smokeless tobacco: Never Used      Comment: pipe and cigar use currently    Alcohol use 0.0 oz/week      Comment: 1 shot per month        Family History   Problem Relation Age of Onset    Cancer Mother     Heart Disease Mother     Stroke Father     Cancer Brother     Diabetes Brother     Heart Disease Brother      No Known Allergies     Prior to Admission medications    Medication Sig Start Date End Date Taking? Authorizing Provider   aspirin (ASPIRIN) 325 mg tablet Take 81 mg by mouth daily. Dominique Vences MD   lisinopril (PRINIVIL, ZESTRIL) 40 mg tablet  1/12/18   Historical Provider   naproxen (NAPROSYN) 500 mg tablet  12/28/17   Historical Provider   metoprolol tartrate (LOPRESSOR) 50 mg tablet Take 50 mg by mouth two (2) times a day. Historical Provider   glimepiride (AMARYL) 2 mg tablet Take 2 mg by mouth every morning. Dominique Vences MD   diphenhydrAMINE (BENADRYL) 25 mg capsule Take 25 mg by mouth every six (6) hours as needed for Sleep. Historical Provider   simvastatin (ZOCOR) 40 mg tablet Take 40 mg by mouth nightly. 3/30/15   Dominique Vences MD   pantoprazole (PROTONIX) 40 mg tablet Take 1 Tab by mouth daily. 2/9/15   Meera Doll MD       REVIEW OF SYSTEMS:     I am not able to complete the review of systems because:    The patient is intubated and sedated    The patient has altered mental status due to his acute medical problems    The patient has baseline aphasia from prior stroke(s)    The patient has baseline dementia and is not reliable historian    The patient is in acute medical distress and unable to provide information           Total of 12 systems reviewed as follows:       POSITIVE= underlined text  Negative = text not underlined  General:  fever, chills, sweats, generalized weakness, weight loss/gain,      loss of appetite   Eyes:    blurred vision, eye pain, loss of vision, double vision  ENT:    rhinorrhea, pharyngitis   Respiratory:   cough, sputum production, SOB, EVANS, wheezing, pleuritic pain   Cardiology:   chest pain, palpitations, orthopnea, PND, edema, syncope   Gastrointestinal:  abdominal pain , N/V, diarrhea, dysphagia, constipation, bleeding Genitourinary:  frequency, urgency, dysuria, hematuria, incontinence   Muskuloskeletal :  arthralgia, myalgia, back pain  Hematology:  easy bruising, nose or gum bleeding, lymphadenopathy   Dermatological: rash, ulceration, pruritis, color change / jaundice  Endocrine:   hot flashes or polydipsia   Neurological:  headache, dizziness, confusion, focal weakness, paresthesia,     Speech difficulties, memory loss, gait difficulty  Psychological: Feelings of anxiety, depression, agitation    Objective:   VITALS:    Visit Vitals    BP (!) 182/95    Pulse (!) 57    Resp 12    Ht 5' 10\" (1.778 m)    Wt 87.6 kg (193 lb 2 oz)    SpO2 95%    BMI 27.71 kg/m2       PHYSICAL EXAM:    General:    Alert, oriented to person, place, year, no distress, appears stated age. HEENT: Atraumatic, anicteric sclerae, pink conjunctivae     No oral ulcers, mucosa moist, throat clear, dentition fair  Neck:  Supple, symmetrical,  thyroid: non tender  Lungs:   Clear to auscultation bilaterally. No Wheezing or Rhonchi. No rales. Chest wall:  No tenderness  No Accessory muscle use. Heart:   Regular  rhythm,  No  murmur   No edema  Abdomen:   Soft, non-tender. Not distended. Bowel sounds normal  Extremities: No cyanosis. No clubbing,      Skin turgor normal, Capillary refill normal, Radial dial pulse 2+  Skin:     Not pale. Not Jaundiced  No rashes   Psych:  Very limited insight, tangential speech. Not depressed. Mildly anxious  Neurologic: EOMs intact. No facial asymmetry. No aphasia or slurred speech. Symmetrical strength, Sensation grossly intact.  Alert and oriented X 4.     _______________________________________________________________________  Care Plan discussed with:    Comments   Patient x    Family      RN x    Care Manager                    Consultant:      _______________________________________________________________________  Expected  Disposition:   Home with Family    HH/PT/OT/RN    SNF/LTC    [facility] ________________________________________________________________________  TOTAL TIME:  60 Minutes    Critical Care Provided     Minutes non procedure based      Comments     Reviewed previous records   >50% of visit spent in counseling and coordination of care  Discussion with patient and/or family and questions answered       ________________________________________________________________________  Signed: Ariadna Leger DO    Procedures: see electronic medical records for all procedures/Xrays and details which were not copied into this note but were reviewed prior to creation of Plan. LAB DATA REVIEWED:    Recent Results (from the past 24 hour(s))   CBC WITH AUTOMATED DIFF    Collection Time: 06/11/18 11:11 PM   Result Value Ref Range    WBC 5.9 4.1 - 11.1 K/uL    RBC 4.80 4. 10 - 5.70 M/uL    HGB 14.7 12.1 - 17.0 g/dL    HCT 44.0 36.6 - 50.3 %    MCV 91.7 80.0 - 99.0 FL    MCH 30.6 26.0 - 34.0 PG    MCHC 33.4 30.0 - 36.5 g/dL    RDW 13.2 11.5 - 14.5 %    PLATELET 715 551 - 360 K/uL    MPV 10.5 8.9 - 12.9 FL    NRBC 0.0 0  WBC    ABSOLUTE NRBC 0.00 0.00 - 0.01 K/uL    NEUTROPHILS 37 32 - 75 %    LYMPHOCYTES 52 (H) 12 - 49 %    MONOCYTES 9 5 - 13 %    EOSINOPHILS 2 0 - 7 %    BASOPHILS 1 0 - 1 %    IMMATURE GRANULOCYTES 0 0.0 - 0.5 %    ABS. NEUTROPHILS 2.2 1.8 - 8.0 K/UL    ABS. LYMPHOCYTES 3.1 0.8 - 3.5 K/UL    ABS. MONOCYTES 0.5 0.0 - 1.0 K/UL    ABS. EOSINOPHILS 0.1 0.0 - 0.4 K/UL    ABS. BASOPHILS 0.0 0.0 - 0.1 K/UL    ABS. IMM.  GRANS. 0.0 0.00 - 0.04 K/UL    DF AUTOMATED     METABOLIC PANEL, COMPREHENSIVE    Collection Time: 06/11/18 11:11 PM   Result Value Ref Range    Sodium 137 136 - 145 mmol/L    Potassium 4.2 3.5 - 5.1 mmol/L    Chloride 101 97 - 108 mmol/L    CO2 27 21 - 32 mmol/L    Anion gap 9 5 - 15 mmol/L    Glucose 108 (H) 65 - 100 mg/dL    BUN 24 (H) 6 - 20 MG/DL    Creatinine 1.50 (H) 0.70 - 1.30 MG/DL    BUN/Creatinine ratio 16 12 - 20      GFR est AA 55 (L) >60 ml/min/1.73m2 GFR est non-AA 45 (L) >60 ml/min/1.73m2    Calcium 9.4 8.5 - 10.1 MG/DL    Bilirubin, total 0.3 0.2 - 1.0 MG/DL    ALT (SGPT) 22 12 - 78 U/L    AST (SGOT) 17 15 - 37 U/L    Alk.  phosphatase 95 45 - 117 U/L    Protein, total 8.6 (H) 6.4 - 8.2 g/dL    Albumin 4.2 3.5 - 5.0 g/dL    Globulin 4.4 (H) 2.0 - 4.0 g/dL    A-G Ratio 1.0 (L) 1.1 - 2.2     CK W/ CKMB & INDEX    Collection Time: 06/11/18 11:11 PM   Result Value Ref Range     39 - 308 U/L    CK - MB 1.0 <3.6 NG/ML    CK-MB Index 1.0 0 - 2.5     TROPONIN I    Collection Time: 06/11/18 11:11 PM   Result Value Ref Range    Troponin-I, Qt. <0.04 <0.05 ng/mL   URINALYSIS W/ REFLEX CULTURE    Collection Time: 06/11/18 11:11 PM   Result Value Ref Range    Color YELLOW/STRAW      Appearance CLEAR CLEAR      Specific gravity 1.017 1.003 - 1.030      pH (UA) 6.5 5.0 - 8.0      Protein 100 (A) NEG mg/dL    Glucose 100 (A) NEG mg/dL    Ketone NEGATIVE  NEG mg/dL    Bilirubin NEGATIVE  NEG      Blood NEGATIVE  NEG      Urobilinogen 0.2 0.2 - 1.0 EU/dL    Nitrites NEGATIVE  NEG      Leukocyte Esterase NEGATIVE  NEG      WBC 0-4 0 - 4 /hpf    RBC 0-5 0 - 5 /hpf    Epithelial cells FEW FEW /lpf    Bacteria NEGATIVE  NEG /hpf    UA:UC IF INDICATED CULTURE NOT INDICATED BY UA RESULT CNI      Hyaline cast 0-2 0 - 5 /lpf   DRUG SCREEN, URINE    Collection Time: 06/11/18 11:11 PM   Result Value Ref Range    AMPHETAMINES NEGATIVE  NEG      BARBITURATES NEGATIVE  NEG      BENZODIAZEPINES NEGATIVE  NEG      COCAINE NEGATIVE  NEG      METHADONE NEGATIVE  NEG      OPIATES NEGATIVE  NEG      PCP(PHENCYCLIDINE) NEGATIVE  NEG      THC (TH-CANNABINOL) NEGATIVE  NEG      Drug screen comment (NOTE)    LACTIC ACID    Collection Time: 06/11/18 11:11 PM   Result Value Ref Range    Lactic acid 1.3 0.4 - 2.0 MMOL/L   EKG, 12 LEAD, INITIAL    Collection Time: 06/11/18 11:36 PM   Result Value Ref Range    Ventricular Rate 61 BPM    Atrial Rate 61 BPM    P-R Interval 242 ms    QRS Duration 98 ms    Q-T Interval 476 ms    QTC Calculation (Bezet) 479 ms    Calculated P Axis 67 degrees    Calculated R Axis 44 degrees    Calculated T Axis 52 degrees    Diagnosis       Sinus rhythm with 1st degree AV block  When compared with ECG of 03-MAR-2018 17:50,  No significant change was found

## 2018-06-12 NOTE — ED NOTES
TRANSFER - OUT REPORT:    Verbal report given to April RN (name) on Estella Freeman  being transferred to Neuro (unit) for urgent transfer       Report consisted of patients Situation, Background, Assessment and   Recommendations(SBAR). Information from the following report(s) SBAR, Kardex, ED Summary, Recent Results and Cardiac Rhythm sinus bradycardia was reviewed with the receiving nurse. Lines:   Peripheral IV 06/11/18 Right Antecubital (Active)   Site Assessment Clean, dry, & intact 6/11/2018 11:10 PM   Phlebitis Assessment 0 6/11/2018 11:10 PM   Infiltration Assessment 0 6/11/2018 11:10 PM   Dressing Status Clean, dry, & intact 6/11/2018 11:10 PM   Dressing Type Transparent 6/11/2018 11:10 PM   Hub Color/Line Status Blue 6/11/2018 11:10 PM        Opportunity for questions and clarification was provided.       Patient transported with:   [facility]

## 2018-06-13 VITALS
RESPIRATION RATE: 16 BRPM | HEIGHT: 70 IN | OXYGEN SATURATION: 97 % | HEART RATE: 61 BPM | DIASTOLIC BLOOD PRESSURE: 82 MMHG | TEMPERATURE: 98.1 F | WEIGHT: 193.12 LBS | BODY MASS INDEX: 27.65 KG/M2 | SYSTOLIC BLOOD PRESSURE: 154 MMHG

## 2018-06-13 LAB
GLUCOSE BLD STRIP.AUTO-MCNC: 175 MG/DL (ref 65–100)
GLUCOSE BLD STRIP.AUTO-MCNC: 209 MG/DL (ref 65–100)
SERVICE CMNT-IMP: ABNORMAL
SERVICE CMNT-IMP: ABNORMAL

## 2018-06-13 PROCEDURE — 74011636637 HC RX REV CODE- 636/637: Performed by: INTERNAL MEDICINE

## 2018-06-13 PROCEDURE — 99218 HC RM OBSERVATION: CPT

## 2018-06-13 PROCEDURE — 74011250637 HC RX REV CODE- 250/637: Performed by: INTERNAL MEDICINE

## 2018-06-13 PROCEDURE — 96372 THER/PROPH/DIAG INJ SC/IM: CPT

## 2018-06-13 PROCEDURE — 74011250637 HC RX REV CODE- 250/637: Performed by: EMERGENCY MEDICINE

## 2018-06-13 PROCEDURE — 82962 GLUCOSE BLOOD TEST: CPT

## 2018-06-13 RX ORDER — GUAIFENESIN 100 MG/5ML
81 LIQUID (ML) ORAL DAILY
Qty: 30 TAB | Refills: 0 | Status: SHIPPED | OUTPATIENT
Start: 2018-06-14

## 2018-06-13 RX ADMIN — PANTOPRAZOLE SODIUM 40 MG: 40 TABLET, DELAYED RELEASE ORAL at 08:03

## 2018-06-13 RX ADMIN — ASPIRIN 81 MG 81 MG: 81 TABLET ORAL at 08:03

## 2018-06-13 RX ADMIN — INSULIN LISPRO 2 UNITS: 100 INJECTION, SOLUTION INTRAVENOUS; SUBCUTANEOUS at 11:31

## 2018-06-13 RX ADMIN — INSULIN LISPRO 3 UNITS: 100 INJECTION, SOLUTION INTRAVENOUS; SUBCUTANEOUS at 08:02

## 2018-06-13 RX ADMIN — METOPROLOL TARTRATE 50 MG: 50 TABLET ORAL at 08:03

## 2018-06-13 NOTE — PROGRESS NOTES
Reviewed discharge instructions including changed in medications and follow up apportionments with patient and son. They verbalized understanding. Son stated all of patients medications and were at home and he had all of his belongings. Encouraged patient to purchase a BP machine dn check BP frequently. Son verbalized understanding  [phone] Discharged via wheelchair by volunteer.

## 2018-06-13 NOTE — PROGRESS NOTES
Pt is a [de-identified] y.o  male admitted with Altered Mental Status. Pt was resting in bed with no distress. Demographic information verified and all is correct. Pt lives alone in a 2 story home with a couple steps to the entrance. Prior to admission, pt was independent with her ADL's and IADL's and driving. Pt had home health in the past and rehab. Pt didn't get specific with the rehab. Pt has a walker and cane. Preferred pharmacy is [facility] in [place]. Pt has a son and 2 grandchildren and a great grandchild. Pt states he doesn't see his family that much. Pt's son can transport pt by car at discharge. CM will set up [facility] for pt for assist with resources. CM will continue to follow for discharge planning needs. Reason for Admission:   Altered Mental Status               RRAT Score:     27             Resources/supports as identified by patient/family:   Son helps pt sometimes                Top Challenges facing patient (as identified by patient/family and CM): Finances/Medication cost?    No issues noted                Transportation? Pt was driving prior to admission and son can assist as needed              Support system or lack thereof? Family - pt states he doesn't see them that often                     Living arrangements? Lives alone           Self-care/ADLs/Cognition? Independent with his ADL's and IADL's          Current Advanced Directive/Advance Care Plan:  Full Code                          Plan for utilizing home health:    No                       Likelihood of readmission: low                 Transition of Care Plan:      Home with f/u appts and [facility]    Care Management Interventions  PCP Verified by CM: Yes  Mode of Transport at Discharge:  Other (see comment) (pt's son can transport by car)  Transition of Care Consult (CM Consult): Discharge Planning  Discharge Durable Medical Equipment: No (cane and walker)  Physical Therapy Consult: No  Occupational Therapy Consult: No  Speech Therapy Consult: No  Current Support Network: Lives Alone, Own Home (lives alone in a 2 story home with steps)  Confirm Follow Up Transport: Family  Discharge Location  Discharge Placement: Via [address], [address] [facility]

## 2018-06-13 NOTE — PROGRESS NOTES
Pt discharged and his son will transport him home by car. F/u appts made and referral sent to [facility]. Care Management Interventions  PCP Verified by CM: Yes  Mode of Transport at Discharge:  Other (see comment) (pt's son will transport by car)  Hospital Transport Time of Discharge: [address]  (CM Consult): Discharge Planning  Discharge Durable Medical Equipment: No  Physical Therapy Consult: No  Occupational Therapy Consult: No  Speech Therapy Consult: No  Current Support Network: Lives Alone, Own Home (lives alone in a 2 story home with steps)  Confirm Follow Up Transport: Family  Plan discussed with Pt/Family/Caregiver: Yes  Discharge Location  Discharge Placement: Via [employer] [address], [facility]

## 2018-06-13 NOTE — PROCEDURES
[place], [address]      Electroencephalogram         Procedure Date: 06/12/2018    Procedure ID: [id number]    Patient Name: rTae Zimmerman     YOB: 1940    Medical Record No: [id number]    INDICATION: Altered mental status      DESCRIPTION OF PROCEDURE: Electrodes were applied in accordance with the international 10-20 system of electrode placement. EEG was reviewed in both bipolar and referential montages    DESCRIPTION OF FINDINGS: Background consists of symmetric  8 hz posterior dominant activity. This activity attenuates with eye opening. With photic stimulation a symmetric occipital driving response is noted. No seizures were noted. IMPRESSION:  Abnormal study. Mild generalized slowing of questionable clinical significance. No seizures noted. .      Absence of seizures on this study does not exclude epilepsy. Clinical correlation is advised.

## 2018-06-13 NOTE — DISCHARGE SUMMARY
Hospitalist Discharge Summary     Patient ID:  Estephanie Back  [id number]  13 y.o.  1940    PCP on record: Lor Singer MD    Admit date: 6/11/2018  Discharge date and time: 6/13/2018      DISCHARGE DIAGNOSIS:    See below      CONSULTATIONS:  IP CONSULT TO HOSPITALIST  IP CONSULT TO NEUROLOGY    Excerpted HPI from H&P of Armando Nelson, DO:  Mario Quick is a [de-identified] y.o.  male presenting with altered sensorium. The patient is unable to provide meaningful history and no family or contacts could be reached for further history. The history is therefore derived from the medical record and discussion with the ED physician. Per report, the patient became confused last night after awakening from a nap. He called his son stating that he felt weak and the patient was brought to the ED by his son, who found him to be acutely confused with significant new-onset memory changes. CT of the head revealed findings consistent with small vessel white matter disease and redemonstrated sellar/suprasellar mass seen on previous studies in the context of known diagnosis of pituitary microadenoma.    ______________________________________________________________________  DISCHARGE SUMMARY/HOSPITAL COURSE:  for full details see H&P, daily progress notes, labs, consult notes. 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 Unchanged periventricular  white matter disease, compatible with chronic small vessel ischemia. Unchanged  sellar/suprasellar mass. No evidence of acute infarct or intracranial  Hemorrhage.  -no fevers  -Pt poor historian, family (son) unavailabe  -Per ED physician report is significant change in memory acutely (24hrs)  -neuro consult  -defer to neuro on benefit of further imaging}    6/13:  Pt stable for discharge home with [facility]. Spoke with CM.  Spoke with pt's son and updated on the plan. Spoke with Neurology who recommends follow up in 2 months - EEG with mild generalized slowing.      Hypertensive Urgency, resolved  Hx CVA  Hx pituitary microadenoma  -cont asa, statin  -findings as above on CT - mass remains unchanged as per the reading. Case discussed with pt's son and pt is to follow up as outpt     Afib  -cont metoprolol  -on asa only, CHADs2 score at least 3  -HAS-BLED score of 4 with risk of significant bleeding 8.7/100    Pt to follow up with PCP in regards to this. DM2  -Continue home Glipizide     HTN  HLD  -pravastatin, metoprolol    Adenocarcinoma of the duodenum  -stage IIIA  -completed 1 cycle chemo, aborted d/t toxicity  -s/p pancreato-duodenectomy 2015  _______________________________________________________________________  Patient seen and examined by me on discharge day. Pertinent Findings:  Gen:    Not in distress  Chest: Clear lungs  CVS:   Regular rhythm. No edema  Abd:  Soft, not distended, not tender  Neuro:  Alert, oriented x3  _______________________________________________________________________  DISCHARGE MEDICATIONS:   Current Discharge Medication List      START taking these medications    Details   aspirin 81 mg chewable tablet Take 1 Tab by mouth daily. Qty: 30 Tab, Refills: 0         CONTINUE these medications which have NOT CHANGED    Details   lisinopril (PRINIVIL, ZESTRIL) 40 mg tablet       metoprolol tartrate (LOPRESSOR) 50 mg tablet Take 50 mg by mouth two (2) times a day. glimepiride (AMARYL) 2 mg tablet Take 2 mg by mouth every morning. diphenhydrAMINE (BENADRYL) 25 mg capsule Take 25 mg by mouth every six (6) hours as needed for Sleep.      simvastatin (ZOCOR) 40 mg tablet Take 40 mg by mouth nightly. pantoprazole (PROTONIX) 40 mg tablet Take 1 Tab by mouth daily.   Qty: 30 Tab, Refills: 0         STOP taking these medications       aspirin (ASPIRIN) 325 mg tablet Comments:   Reason for Stopping:         naproxen (NAPROSYN) 500 mg tablet Comments:   Reason for Stopping:               My Recommended Diet, Activity, Wound Care, and follow-up labs are listed in the patient's Discharge Insturctions which I have personally completed and reviewed.     ______________________________________________________________________    Risk of deterioration: Low    Condition at Discharge:  Stable  ______________________________________________________________________    Disposition  Home with family and home health services  ______________________________________________________________________    Care Plan discussed with:   Patient, Family, RN, Care Manager, Consultant    ______________________________________________________________________    Code Status: Full Code  ______________________________________________________________________      Follow up with:   PCP : Conchita Michael MD  Follow-up Information     Follow up With Details Comments Contact Info    Conchita Michael MD Go on 6/26/2018 Please follow up on June 26, 2018 at 2:45 [address]  [phone]      [facility] on [address] [facility] [phone]  Marita Dawson MD In 2 months  [address]  [phone]                Total time in minutes spent coordinating this discharge (includes going over instructions, follow-up, prescriptions, and preparing report for sign off to her PCP) :  35 minutes    Signed:  Daniela Aguilar MD

## 2018-06-13 NOTE — DISCHARGE INSTRUCTIONS
High Blood Pressure: Care Instructions  Your Care Instructions    If your blood pressure is usually above 140/90, you have high blood pressure, or hypertension. That means the top number is 140 or higher or the bottom number is 90 or higher, or both. Despite what a lot of people think, high blood pressure usually doesn't cause headaches or make you feel dizzy or lightheaded. It usually has no symptoms. But it does increase your risk for heart attack, stroke, and kidney or eye damage. The higher your blood pressure, the more your risk increases. Your doctor will give you a goal for your blood pressure. Your goal will be based on your health and your age. An example of a goal is to keep your blood pressure below 140/90. Lifestyle changes, such as eating healthy and being active, are always important to help lower blood pressure. You might also take medicine to reach your blood pressure goal.  Follow-up care is a key part of your treatment and safety. Be sure to make and go to all appointments, and call your doctor if you are having problems. It's also a good idea to know your test results and keep a list of the medicines you take. How can you care for yourself at home? Medical treatment  · If you stop taking your medicine, your blood pressure will go back up. You may take one or more types of medicine to lower your blood pressure. Be safe with medicines. Take your medicine exactly as prescribed. Call your doctor if you think you are having a problem with your medicine. · Talk to your doctor before you start taking aspirin every day. Aspirin can help certain people lower their risk of a heart attack or stroke. But taking aspirin isn't right for everyone, because it can cause serious bleeding. · See your doctor regularly. You may need to see the doctor more often at first or until your blood pressure comes down.   · If you are taking blood pressure medicine, talk to your doctor before you take decongestants or anti-inflammatory medicine, such as ibuprofen. Some of these medicines can raise blood pressure. · Learn how to check your blood pressure at home. Lifestyle changes  · Stay at a healthy weight. This is especially important if you put on weight around the waist. Losing even 10 pounds can help you lower your blood pressure. · If your doctor recommends it, get more exercise. Walking is a good choice. Bit by bit, increase the amount you walk every day. Try for at least 30 minutes on most days of the week. You also may want to swim, bike, or do other activities. · Avoid or limit alcohol. Talk to your doctor about whether you can drink any alcohol. · Try to limit how much sodium you eat to less than 2,300 milligrams (mg) a day. Your doctor may ask you to try to eat less than 1,500 mg a day. · Eat plenty of fruits (such as bananas and oranges), vegetables, legumes, whole grains, and low-fat dairy products. · Lower the amount of saturated fat in your diet. Saturated fat is found in animal products such as milk, cheese, and meat. Limiting these foods may help you lose weight and also lower your risk for heart disease. · Do not smoke. Smoking increases your risk for heart attack and stroke. If you need help quitting, talk to your doctor about stop-smoking programs and medicines. These can increase your chances of quitting for good. When should you call for help? Call 911 anytime you think you may need emergency care. This may mean having symptoms that suggest that your blood pressure is causing a serious heart or blood vessel problem. Your blood pressure may be over 180/110. ? For example, call 911 if:  ? · You have symptoms of a heart attack. These may include:  ¨ Chest pain or pressure, or a strange feeling in the chest.  ¨ Sweating. ¨ Shortness of breath. ¨ Nausea or vomiting.   ¨ Pain, pressure, or a strange feeling in the back, neck, jaw, or upper belly or in one or both shoulders or arms.  ¨ Lightheadedness or sudden weakness. ¨ A fast or irregular heartbeat. ? · You have symptoms of a stroke. These may include:  ¨ Sudden numbness, tingling, weakness, or loss of movement in your face, arm, or leg, especially on only one side of your body. ¨ Sudden vision changes. ¨ Sudden trouble speaking. ¨ Sudden confusion or trouble understanding simple statements. ¨ Sudden problems with walking or balance. ¨ A sudden, severe headache that is different from past headaches. ? · You have severe back or belly pain. ?Do not wait until your blood pressure comes down on its own. Get help right away. ?Call your doctor now or seek immediate care if:  ? · Your blood pressure is much higher than normal (such as 180/110 or higher), but you don't have symptoms. ? · You think high blood pressure is causing symptoms, such as:  ¨ Severe headache. ¨ Blurry vision. ? Watch closely for changes in your health, and be sure to contact your doctor if:  ? · Your blood pressure measures 140/90 or higher at least 2 times. That means the top number is 140 or higher or the bottom number is 90 or higher, or both. ? · You think you may be having side effects from your blood pressure medicine. ? · Your blood pressure is usually normal, but it goes above normal at least 2 times. Where can you learn more? Go to http://salud-susana.info/. Enter O145 in the search box to learn more about \"High Blood Pressure: Care Instructions. \"  Current as of: September 21, 2016  Content Version: 11.4  © 1267-3552 [employer]. Care instructions adapted under license by [employer] (which disclaims liability or warranty for this information). If you have questions about a medical condition or this instruction, always ask your healthcare professional. [place] [address] any warranty or liability for your use of this information.

## 2018-06-13 NOTE — PROGRESS NOTES
Chief Complaint: altered mental status    Discussed the results of the eeg . Patient is back at baseline . Concerned about his blood sugar    Assesment and Plan  1. Disorientation  Resolved  EEG normal    2. Familial hypercholesterolemia  Continue pravstatin    3. Unsteady gait  Uses a cane    4. Essential hypertension  Continue lopressor      Allergies  Review of patient's allergies indicates no known allergies. Medications  Current Facility-Administered Medications   Medication Dose Route Frequency    sodium chloride (NS) flush 5-10 mL  5-10 mL IntraVENous Q8H    sodium chloride (NS) flush 5-10 mL  5-10 mL IntraVENous PRN    heparin (porcine) injection 5,000 Units  5,000 Units SubCUTAneous Q8H    metoprolol tartrate (LOPRESSOR) tablet 50 mg  50 mg Oral BID    pravastatin (PRAVACHOL) tablet 80 mg  80 mg Oral QHS    pantoprazole (PROTONIX) tablet 40 mg  40 mg Oral DAILY    aspirin chewable tablet 81 mg  81 mg Oral DAILY    hydrALAZINE (APRESOLINE) 20 mg/mL injection 10 mg  10 mg IntraVENous Q2H PRN    glucose chewable tablet 16 g  4 Tab Oral PRN    dextrose (D50W) injection syrg 12.5-25 g  12.5-25 g IntraVENous PRN    glucagon (GLUCAGEN) injection 1 mg  1 mg IntraMUSCular PRN    insulin lispro (HUMALOG) injection   SubCUTAneous AC&HS        Medical History  Past Medical History:   Diagnosis Date    Aneurysm (HCC)     aortic    Arrhythmia     A-fib    Calculus of kidney     [facility]     Chronic kidney disease     stone    Diabetes [facility]     Fast heart beat     Former tobacco use     Hypertension     Muscle weakness     Prostate disorder     Stomach pain      Review of Systems   Constitutional: Negative for chills and fever. HENT: Negative for ear pain. Eyes: Negative for pain and discharge. Respiratory: Negative for cough and hemoptysis. Cardiovascular: Negative for chest pain and claudication. Gastrointestinal: Negative for constipation and diarrhea.    Genitourinary: Negative for flank pain and hematuria. Musculoskeletal: Positive for back pain, joint pain and myalgias. Skin: Negative for itching and rash. Neurological: Negative for headaches. Endo/Heme/Allergies: Negative for environmental allergies. Does not bruise/bleed easily. Psychiatric/Behavioral: Positive for depression. Negative for hallucinations. Exam:    Visit Vitals    /82 (BP 1 Location: Left arm, BP Patient Position: At rest)    Pulse 61    Temp 98.1 °F (36.7 °C)    Resp 16    Ht 5' 10\" (1.778 m)    Wt 193 lb 2 oz (87.6 kg)    SpO2 97%    BMI 27.71 kg/m2      General: Well developed, well nourished. Patient in no apparent distress   Head: Normocephalic, atraumatic, anicteric sclera   Neck Normal ROM, No thyromegally   Lungs:  Clear to auscultation bilaterally, No wheezes or rubs   Cardiac: Regular rate and rhythm with no murmurs. Abd: Bowel sounds were audible. No tenderness on palpation   Ext: No pedal edema   Skin: Supple no rash      NeurologicExam:  Mental Status: Alert and oriented to person place and time   Speech: Fluent no aphasia or dysarthria. Cranial Nerves:  II - XII Intact   Motor:  Full and symmetric strength of upper and lower proximal and distal muscles. Normal bulk and tone. Reflexes:   Deep tendon reflexes 1+/4 and symmetric. Sensory:   Symmetric and intact with no perceived deficits modalities involving small or large fibers. Gait:  Gait is unsteady and wide based    Tremor:   No tremor noted. Cerebellar:  Coordination intact. Neurovascular: No carotid bruits. No JVD             Imaging  CT Results (most recent):    Results from Hospital Encounter encounter on 06/11/18   CT HEAD WO CONT   Narrative EXAM:  CT HEAD WO CONT    INDICATION:   Vertigo, episodic, peripheral; dizzy/ weakness    COMPARISON: September 19, 2017. CONTRAST:  None. TECHNIQUE: Unenhanced CT of the head was performed using 5 mm images. Brain and  bone windows were generated. CT dose reduction was achieved through use of a  standardized protocol tailored for this examination and automatic exposure  control for dose modulation. FINDINGS:  The ventricles and sulci are stable in size, shape and configuration and  midline. There is unchanged periventricular white matter disease. Encephalomalacia in the bilateral occipital lobes is unchanged. 1.6 cm  sellar/suprasellar mass is unchanged. There is no intracranial hemorrhage,  extra-axial collection, mass effect or midline shift. The basilar cisterns are  open. No acute infarct is identified. The bone windows demonstrate no  abnormalities. The visualized portions of the paranasal sinuses and mastoid air  cells are clear. 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 Unchanged periventricular  white matter disease, compatible with chronic small vessel ischemia. Unchanged  sellar/suprasellar mass. No evidence of acute infarct or intracranial  hemorrhage. MRI Results (most recent):    Results from [place] encounter on 09/18/17   MRI BRAIN W WO CONT   Narrative INDICATION:  pitutary mass with occipital infarctions. EXAMINATION:  MRI BRAIN, PITUITARY, W/WO CONTRAST    COMPARISON:  None    TECHNIQUE:  MR imaging of the brain was performed with particular attention to  the pituitary gland including sagittal T1, axial T1, T2, FLAIR, DWI/ADC; Pre and  post contrast dynamic imaging was performed using 15 mL of gadolinium with  particular attention to the sella turcica. FINDINGS:      There is a large, homogeneously enhancing mass centered in the sella turcica  extending into the suprasellar cistern which measures 2.0 x 1.6 x 1.6 cm. This  displaces the infundibulum and optic chiasm superiorly. This is adjacent to the  cavernous carotid and supraclinoid internal carotid artery segments although  they are patent. There is possible extension toward the superior aspect of the  left cavernous sinus. The ventricles are midline without hydrocephalus. There is  moderate chronic periventricular white matter disease, which also involves the  verena. Probable remote right occipital infarction. There is no acute infarction  or intracranial hemorrhage. Major intracranial vascular flow-voids are patent. 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 Possible left cavernous sinus  invasion. 2. Chronic white matter disease and ischemic changes. No acute infarction.           Lab Review  Lab Results   Component Value Date/Time    WBC 5.9 06/11/2018 11:11 PM    HCT 44.0 06/11/2018 11:11 PM    HGB 14.7 06/11/2018 11:11 PM    PLATELET 769 10/51/4110 11:11 PM       Lab Results   Component Value Date/Time    Sodium 137 06/11/2018 11:11 PM    Potassium 4.2 06/11/2018 11:11 PM    Chloride 101 06/11/2018 11:11 PM    CO2 27 06/11/2018 11:11 PM    Glucose 108 (H) 06/11/2018 11:11 PM    BUN 24 (H) 06/11/2018 11:11 PM    Creatinine 1.50 (H) 06/11/2018 11:11 PM    Calcium 9.4 06/11/2018 11:11 PM         Lab Results   Component Value Date/Time    Hemoglobin A1c 5.8 01/14/2015 04:38 AM          Lab Results   Component Value Date/Time    Cholesterol, total 149 09/22/2017 01:47 AM    HDL Cholesterol 41 09/22/2017 01:47 AM    LDL, calculated 68.8 09/22/2017 01:47 AM    VLDL, calculated 39.2 09/22/2017 01:47 AM    Triglyceride 196 (H) 09/22/2017 01:47 AM    CHOL/HDL Ratio 3.6 09/22/2017 01:47 AM

## 2018-07-10 RX ORDER — BISMUTH SUBSALICYLATE 262 MG
1 TABLET,CHEWABLE ORAL DAILY
COMMUNITY

## 2018-07-10 RX ORDER — PIOGLITAZONEHYDROCHLORIDE 15 MG/1
30 TABLET ORAL DAILY
COMMUNITY

## 2018-07-10 NOTE — PERIOP NOTES
[facility]  Ambulatory Surgery Unit  Pre-operative Instructions    Procedure Date  Monday, July 16, 2018            Tentative Arrival Time 1115      1. On the day of your procedure, please report to the Ambulatory Surgery Unit Registration Desk and sign in at your designated time. The Ambulatory Surgery Unit is located in [facility] on the [facility] side of the [facility] across from the [address] [place] [address]. Please have all of your health insurance cards and a photo ID. 2. You must have someone with you to drive you home as directed by your surgeon. 3. You may have a light breakfast and take normal morning medications. 4. We recommend you do not drink any alcoholic beverages for 24 hours before and after your procedure. 5. Contact your surgeons office for instructions on the following medications: non-steroidal anti-inflammatory drugs (i.e. Advil, Aleve), vitamins, and supplements. (Some surgeons will want you to stop these medications prior to surgery and others may allow you to take them)   **If you are currently taking Plavix, Coumadin, Aspirin and/or other blood-thinning agents, contact your surgeon for instructions. ** Your surgeon will partner with the physician prescribing these medications to determine if it is safe to stop or if you need to continue taking. Please do not stop taking these medications without instructions from your surgeon. 6. In an effort to help prevent surgical site infection, we ask that you shower with an anti-bacterial soap (i.e. Dial or Safeguard) on the morning of your procedure. Do not apply any lotions, powders, or deodorants after showering. 7. Wear comfortable clothes. Wear glasses instead of contacts. Do not bring any jewelry or money (other than copays or fees as instructed). Do not wear make-up, particularly mascara, the morning of your procedure. Wear your hair loose or down, no ponytails, buns, kristal pins or clips.  All body piercings must be removed. 8. You should understand that if you do not follow these instructions your procedure may be cancelled. If your physical condition changes (i.e. fever, cold or flu) please contact your surgeon as soon as possible. 9. It is important that you be on time. If a situation occurs where you may be late, or if you have any questions or problems, please call [phone].    10. Your procedure time may be subject to change. You will receive a phone call the day prior to confirm your arrival time. I understand a pre-operative phone call will be made to verify my procedure time. In the event that I am not available, I give permission for a message to be left on my answering service and/or with another person?       yes    Preop instructions reviewed  Pt verbalized understanding.      ___________________      ___________________      ___________________  (Signature of Patient)          (Witness)                   (Date and Time)

## 2018-07-16 ENCOUNTER — HOSPITAL ENCOUNTER (OUTPATIENT)
Age: 78
Setting detail: OUTPATIENT SURGERY
Discharge: HOME | End: 2018-07-16
Attending: OPHTHALMOLOGY | Admitting: OPHTHALMOLOGY
Payer: MEDICARE

## 2018-07-16 VITALS
WEIGHT: 179 LBS | SYSTOLIC BLOOD PRESSURE: 122 MMHG | HEART RATE: 63 BPM | HEIGHT: 71 IN | RESPIRATION RATE: 17 BRPM | BODY MASS INDEX: 25.06 KG/M2 | DIASTOLIC BLOOD PRESSURE: 71 MMHG

## 2018-07-16 PROCEDURE — 74011000250 HC RX REV CODE- 250

## 2018-07-16 PROCEDURE — 76030000017 HC AMB SURG FIRST 0.5 HR INTENSV-TIER 1: Performed by: OPHTHALMOLOGY

## 2018-07-16 PROCEDURE — 74011000250 HC RX REV CODE- 250: Performed by: OPHTHALMOLOGY

## 2018-07-16 RX ORDER — TROPICAMIDE 10 MG/ML
1 SOLUTION/ DROPS OPHTHALMIC
Status: COMPLETED | OUTPATIENT
Start: 2018-07-16 | End: 2018-07-16

## 2018-07-16 RX ORDER — TROPICAMIDE 10 MG/ML
SOLUTION/ DROPS OPHTHALMIC
Status: COMPLETED
Start: 2018-07-16 | End: 2018-07-16

## 2018-07-16 RX ORDER — TETRACAINE HYDROCHLORIDE 5 MG/ML
1 SOLUTION OPHTHALMIC ONCE
Status: COMPLETED | OUTPATIENT
Start: 2018-07-16 | End: 2018-07-16

## 2018-07-16 RX ORDER — BRIMONIDINE TARTRATE 2 MG/ML
1 SOLUTION/ DROPS OPHTHALMIC
Status: COMPLETED | OUTPATIENT
Start: 2018-07-16 | End: 2018-07-16

## 2018-07-16 RX ORDER — TETRACAINE HYDROCHLORIDE 5 MG/ML
SOLUTION OPHTHALMIC
Status: COMPLETED
Start: 2018-07-16 | End: 2018-07-16

## 2018-07-16 RX ADMIN — TETRACAINE HYDROCHLORIDE 1 DROP: 5 SOLUTION OPHTHALMIC at 11:43

## 2018-07-16 RX ADMIN — TROPICAMIDE 1 DROP: 10 SOLUTION/ DROPS OPHTHALMIC at 11:12

## 2018-07-16 RX ADMIN — BRIMONIDINE TARTRATE 1 DROP: 2 SOLUTION OPHTHALMIC at 11:12

## 2018-07-16 RX ADMIN — BRIMONIDINE TARTRATE 1 DROP: 2 SOLUTION OPHTHALMIC at 11:18

## 2018-07-16 RX ADMIN — BRIMONIDINE TARTRATE 1 DROP: 2 SOLUTION OPHTHALMIC at 11:47

## 2018-07-16 RX ADMIN — TROPICAMIDE 1 DROP: 10 SOLUTION/ DROPS OPHTHALMIC at 11:18

## 2018-07-16 NOTE — DISCHARGE INSTRUCTIONS
Patient to resume all home medications and activities. Post-op appt card to be given to patient by Dr. Courtney Ernandez. No prescriptions. Wear sunglasses until pupil returns to normal size. Please call Dr. Sri Livingston office with any questions at [phone].

## 2018-07-16 NOTE — OP NOTES
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 Tetracaine was used topically for anesthesia. The patient was seated at the YAG laser, and the laser was used to make a central opening in the capsule with a total of 47 impulses at 2.1 mJ power. Brimonidine 0.2% was placed postoperatively. The patient tolerated the procedure well and is to follow-up in one week.

## 2018-07-16 NOTE — H&P
Surgery History and Physcial    Subjective:      Vickey Kidney is a [de-identified] y.o. male with secondary membrane / opacified capsule right eye for Yag laser capsulotomy right eye. 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    Diabetes mellitus ([facility] 02/06/2015    Hyponatremia 11/26/2014     Past Medical History:   Diagnosis Date    Aneurysm ([address]     aortic    Arrhythmia     A-fib    Calculus of kidney     Cancer ([address]     Chronic kidney disease     stone    Diabetes ([address]     Fast heart beat     Former tobacco use     Hypertension     Muscle weakness     Prostate disorder     Stomach pain       Past Surgical History:   Procedure Laterality Date    ABDOMEN SURGERY PROC UNLISTED      Whipple Procedure    HX ORTHOPAEDIC      neck surgery    HX OTHER SURGICAL  1/6/2015    Whipple procedure, G tube, J tube for duodenal cancer    UPPER GI ENDOSCOPY,BALL DIL,30MM  12/2/2014         UPPER GI ENDOSCOPY,BIOPSY  11/26/2014         UPPER GI ENDOSCOPY,BIOPSY  12/2/2014         UPPER GI ENDOSCOPY,DIAGNOSIS  1/23/2015           Social History   Substance Use Topics    Smoking status: Former Smoker     Packs/day: 0.00     Years: 60.00    Smokeless tobacco: Never Used      Comment: pipe and cigar use currently    Alcohol use 0.0 oz/week      Comment: 1 shot per month      Family History   Problem Relation Age of Onset    Cancer Mother     Heart Disease Mother     Stroke Father     Cancer Brother     Diabetes Brother     Heart Disease Brother       Prior to Admission medications    Medication Sig Start Date End Date Taking? Authorizing Provider   pioglitazone (ACTOS) 15 mg tablet Take 15 mg by mouth daily. Yes Historical Provider   multivitamin (ONE A DAY) tablet Take 1 Tab by mouth daily.    Yes Historical Provider   aspirin 81 mg chewable tablet Take 1 Tab by mouth daily. 6/14/18  Yes Torri Pillai MD   lisinopril (PRINIVIL, ZESTRIL) 40 mg tablet  1/12/18  Yes Historical Provider   metoprolol tartrate (LOPRESSOR) 50 mg tablet Take 50 mg by mouth two (2) times a day. Yes Historical Provider   glimepiride (AMARYL) 2 mg tablet Take 2 mg by mouth two (2) times a day. Yes Dominique Vences MD   diphenhydrAMINE (BENADRYL) 25 mg capsule Take 25 mg by mouth every six (6) hours as needed for Sleep. Yes Historical Provider   simvastatin (ZOCOR) 40 mg tablet Take 40 mg by mouth nightly. 3/30/15  Yes Dominique Vences MD   pantoprazole (PROTONIX) 40 mg tablet Take 1 Tab by mouth daily. 2/9/15  Yes Toño Mtz MD     No Known Allergies      Review of Systems   All other systems reviewed and are negative. Objective:     Visit Vitals    Ht 5' 10.5\" (1.791 m)    Wt 81.2 kg (179 lb)    BMI 25.32 kg/m2       Physical Exam   Constitutional: He is oriented to person, place, and time. He appears well-developed and well-nourished. HENT:   Head: Normocephalic and atraumatic. Cardiovascular: Normal heart sounds. Pulmonary/Chest: Breath sounds normal.   Abdominal: Bowel sounds are normal.   Musculoskeletal: Normal range of motion. Neurological: He is alert and oriented to person, place, and time. Psychiatric: He has a normal mood and affect. Imaging:      Lab Review:  No results found for this or any previous visit (from the past 24 hour(s)). Assessment:     Secondary membrane / opacified capsule right eye for Yag laser capsulotomy right eye. Plan:     Secondary membrane / opacified capsule right eye for Yag laser capsulotomy right eye.     Signed By: Molly Chan MD     July 16, 2018

## 2018-07-16 NOTE — BRIEF OP NOTE
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   Anesthesia: Topical   Estimated Blood Loss: none  Specimens: * No specimens in log *   Findings: Secondary membrane right eye  Complications: none  Implants: * No implants in log *

## 2019-02-15 ENCOUNTER — TELEPHONE (OUTPATIENT)
Dept: ONCOLOGY | Age: 79
End: 2019-02-15

## 2019-02-15 DIAGNOSIS — C17.0 CARCINOMA OF DUODENUM (HCC): Primary | ICD-10-CM

## 2019-02-15 NOTE — TELEPHONE ENCOUNTER
Patient called requesting a return call. Patient missed his follow up appointment and would like to know reschedule after his CT. Patient stated he normally has a CT scan before seeing Dr. Gustavo Pruett. He would like to know if a CT is needed this time. Please return patient's call to discuss [phone].   fidel

## 2019-02-18 ENCOUNTER — HOSPITAL ENCOUNTER (OUTPATIENT)
Dept: CT IMAGING | Age: 79
Discharge: HOME | End: 2019-02-18
Attending: INTERNAL MEDICINE
Payer: MEDICARE

## 2019-02-18 DIAGNOSIS — C17.0 CARCINOMA OF DUODENUM (HCC): ICD-10-CM

## 2019-02-18 LAB — CREAT BLD-MCNC: 1.4 MG/DL (ref 0.6–1.3)

## 2019-02-18 PROCEDURE — 74011636320 HC RX REV CODE- 636/320: Performed by: INTERNAL MEDICINE

## 2019-02-18 PROCEDURE — 74177 CT ABD & PELVIS W/CONTRAST: CPT

## 2019-02-18 PROCEDURE — 82565 ASSAY OF CREATININE: CPT

## 2019-02-18 PROCEDURE — 74011000255 HC RX REV CODE- 255: Performed by: INTERNAL MEDICINE

## 2019-02-18 RX ORDER — BARIUM SULFATE 20 MG/ML
900 SUSPENSION ORAL
Status: COMPLETED | OUTPATIENT
Start: 2019-02-18 | End: 2019-02-18

## 2019-02-18 RX ORDER — SODIUM CHLORIDE 0.9 % (FLUSH) 0.9 %
10 SYRINGE (ML) INJECTION
Status: COMPLETED | OUTPATIENT
Start: 2019-02-18 | End: 2019-02-18

## 2019-02-18 RX ADMIN — Medication 10 ML: at 16:00

## 2019-02-18 RX ADMIN — BARIUM SULFATE 900 ML: 20 SUSPENSION ORAL at 17:00

## 2019-02-18 RX ADMIN — IOPAMIDOL 100 ML: 755 INJECTION, SOLUTION INTRAVENOUS at 16:00

## 2019-02-18 NOTE — TELEPHONE ENCOUNTER
Return call placed to pt. Legrand December the caretaker answered; Legrand December not on pt's [address]; Legrand December handed pt the phone to say his name; JARED verified. Patient and Legrand December informed per NP note to schedule CT then make appt with doctor after appt. Goran December and pt provided number for scheduling to schedule CT and advised to call us back to schedule an appt with the doctor.

## 2019-02-20 ENCOUNTER — OFFICE VISIT (OUTPATIENT)
Dept: ONCOLOGY | Age: 79
End: 2019-02-20

## 2019-02-20 VITALS
SYSTOLIC BLOOD PRESSURE: 144 MMHG | BODY MASS INDEX: 29.35 KG/M2 | TEMPERATURE: 97.4 F | DIASTOLIC BLOOD PRESSURE: 84 MMHG | OXYGEN SATURATION: 98 % | HEIGHT: 70 IN | RESPIRATION RATE: 18 BRPM | WEIGHT: 205 LBS | HEART RATE: 59 BPM

## 2019-02-20 DIAGNOSIS — Z85.068 HISTORY OF DUODENAL CANCER: Primary | ICD-10-CM

## 2019-02-20 DIAGNOSIS — C17.0 CARCINOMA OF DUODENUM (HCC): ICD-10-CM

## 2019-02-20 NOTE — PROGRESS NOTES
Pt is a [de-identified] yr old pt here for a routine follow up for colon cancer, his most recent scans were negative. Pt is here with his son today. Pt does report some circulation issues that he is following up with a surgeon. Pt denies pain, denies N/V/D, eating well and gaining weight. Visit Vitals /84 Pulse (!) 59 Temp 97.4 °F (36.3 °C) Resp 18 Ht 5' 10\" (1.778 m) Wt 205 lb (93 kg) SpO2 98% BMI 29.41 kg/m² Pain Scale: 0 - No pain/10 Pain Location:   
 
Health Maintenance reviewed 1. Have you been to the ER, urgent care clinic since your last visit? Hospitalized since your last visit? no 
 
2. Have you seen or consulted any other health care providers outside of the [address] [place] [address] since your last visit? Include any pap smears or colon screening.  no

## 2019-02-20 NOTE — PROGRESS NOTES
[address] [facility] 
at [place] [address], [facility], [address] 
[phone] Follow up Note Patient: Ariana Paredes MRN: [id number]  SSN: [id number] YOB: 1940  Age: [de-identified] y.o. Sex: male Diagnosis: 1. Adenocarcinoma of the duodenum: Dx 1/6/2015 
 T4 N1 M0 (Stage III) Treatment: 1. Adjuvant single agent Capecitabine, single agent S/P 1 cycles, therapy stopped due to severe diarrhea leading to hospitalization 2. Pancreato-duodenectomy on 01/06/2015 Subjective:  
  
Ariana Paredes is a [de-identified] y.o. 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 He underwent a pancreato-duodenectomy on 01/06/2015. The tumor in the duodenum was found to be 2.5 cm in size and invaded the pancreatic head and 1/15 LN had disease. He stayed in the hospital until Feb 2015 recovering from the surgery. Mr. Jw Miller elected to receive adjuvant chemotherapy in the form of single agent Capecitabine. He suffered severe diarrhea and I had to discontinue therapy early in the course. Mr. Jw Miller is doing well and and denies N/V/D or weight loss. Recent CT shows he remains in complete remission. He is following with a vascular surgeon regarding some \"circulation\" issues. Review of Systems: 
 
Constitutional: negative Eyes: negative Ears, Nose, Mouth, Throat, and Face: negative Respiratory: negative Cardiovascular: negative Gastrointestinal: negative Integument: negative Hematologic/Lymphatic: negative Musculoskeletal:negative Neurological: negative Past Medical History:  
Diagnosis Date  Aneurysm ([address] aortic  Arrhythmia A-fib  Calculus of kidney  Cancer ([facility]  Chronic kidney disease   
 stone  Diabetes ([address]  Fast heart beat  Former tobacco use  Hypertension  Muscle weakness  Prostate disorder  Stomach pain Past Surgical History:  
Procedure Laterality Date [address] [place] [address] UNLISTED Whipple Procedure  HX ORTHOPAEDIC    
 neck surgery  HX OTHER SURGICAL  1/6/2015 Whipple procedure, G tube, J tube for duodenal cancer  UPPER GI ENDOSCOPY,BALL DIL,30MM  12/2/2014  UPPER GI ENDOSCOPY,BIOPSY  11/26/2014  UPPER GI ENDOSCOPY,BIOPSY  12/2/2014  UPPER GI ENDOSCOPY,DIAGNOSIS  1/23/2015 Family History Problem Relation Age of Onset  Cancer Mother  Heart Disease Mother  Stroke Father  Cancer Brother  Diabetes Brother  Heart Disease Brother Social History Tobacco Use  Smoking status: Former Smoker Packs/day: 0.00 Years: 60.00 Pack years: 0.00  Smokeless tobacco: Never Used  Tobacco comment: pipe and cigar use currently Substance Use Topics  Alcohol use: Yes Alcohol/week: 0.0 oz  
  Comment: 1 shot per month Prior to Admission medications Medication Sig Start Date End Date Taking? Authorizing Provider  
pioglitazone (ACTOS) 15 mg tablet Take 15 mg by mouth daily. Yes Provider, Historical  
multivitamin (ONE A DAY) tablet Take 1 Tab by mouth daily. Yes Provider, Historical  
aspirin 81 mg chewable tablet Take 1 Tab by mouth daily. 6/14/18  Yes Reji Rucker MD  
lisinopril (PRINIVIL, ZESTRIL) 40 mg tablet  1/12/18  Yes Provider, Historical  
metoprolol tartrate (LOPRESSOR) 50 mg tablet Take 50 mg by mouth two (2) times a day. Yes Provider, Historical  
glimepiride (AMARYL) 2 mg tablet Take 2 mg by mouth two (2) times a day. Yes Other, MD Dominique  
diphenhydrAMINE (BENADRYL) 25 mg capsule Take 25 mg by mouth every six (6) hours as needed for Sleep. Yes Provider, Historical  
simvastatin (ZOCOR) 40 mg tablet Take 40 mg by mouth nightly. 3/30/15  Yes Dominique Vences MD  
pantoprazole (PROTONIX) 40 mg tablet Take 1 Tab by mouth daily. 2/9/15  Yes Olivia Ray MD  
  
 
 
No Known Allergies Objective:  
 
Vitals:  
 02/20/19 1159 BP: 144/84 Pulse: (!) 59 Resp: 18 Temp: 97.4 °F (36.3 °C) SpO2: 98% Weight: 205 lb (93 kg) Height: 5' 10\" (1.778 m) Pain Scale: 0 - No pain/10 Physical Exam: 
 
GENERAL: alert, cooperative EYE: negative LYMPHATIC: Cervical, supraclavicular, and axillary nodes normal.  
THROAT & NECK: normal and no erythema or exudates noted. LUNG: clear to auscultation bilaterally HEART: regular rate and rhythm ABDOMEN: soft, non-tender EXTREMITIES: no cyanosis or edema SKIN: Normal. 
NEUROLOGIC: negative CT Results (most recent): 
Results from [facility] [place] encounter on 02/18/19 CT ABD PELV W CONT Narrative EXAM: CT ABD PELV W CONT INDICATION: hx of cancer of the duodenum COMPARISON: March 3, 2018 CONTRAST: 100 mL of [id number]. TECHNIQUE:  
Following the uneventful intravenous administration of contrast, thin axial 
images were obtained through the abdomen and pelvis. Coronal and sagittal 
reconstructions were generated. Oral contrast was not administered. CT dose 
reduction was achieved through use of a standardized protocol tailored for this 
examination and automatic exposure control for dose modulation. FINDINGS:  
LUNG BASES: Clear. INCIDENTALLY IMAGED HEART AND MEDIASTINUM: Unremarkable. LIVER: No mass or biliary dilatation. GALLBLADDER: Cholecystectomy SPLEEN: No mass. PANCREAS: No mass or ductal dilatation. ADRENALS: Unremarkable. KIDNEYS: No mass, calculus, or hydronephrosis. STOMACH: Unremarkable. Prior duodenal surgery SMALL BOWEL: No dilatation or wall thickening. COLON: No dilatation or wall thickening. Sigmoid diverticulosis. APPENDIX: Unremarkable. PERITONEUM: No ascites or pneumoperitoneum. RETROPERITONEUM: No lymphadenopathy or aortic aneurysm. Prostate is prominent in size. URINARY BLADDER: No mass or calculus. BONES: No destructive bone lesion.  
ADDITIONAL COMMENTS: N/A 
  
 Impression IMPRESSION: 
Surgical changes, no evidence to suggest residual or recurrent disease. Incidentals are stable. Assessment: 1. Adenocarcinoma of the duodenum T4 N1 M0 (Stage III) The risk of recurrence of the disease is ~ 50% at 5 years. ECOG PS 0 Intent of therapy - curative S/P pancreato-duodenectomy on 01/06/2015 Patient elected to pursue systemic adjuvant chemotherapy Received 1 cycle of single agent Capecitabine Therapy stopped due to severe toxicity On observation alone In remission At this point, 4 years from diagnosis, he is cured of colon cancer. CT Abd Pelv (2/18/2019): In remission Symptom management form reviewed with patient. Plan:   
 
 
>  Return as needed Signed by: Behzad Roldan MD 
                   February 20, 2019 
 
 
 
 
CC. Alejandra Moeller MD 
CC.  Florin Ayers MD

## 2019-03-10 ENCOUNTER — APPOINTMENT (OUTPATIENT)
Dept: CT IMAGING | Age: 79
End: 2019-03-10
Attending: EMERGENCY MEDICINE
Payer: MEDICARE

## 2019-03-10 ENCOUNTER — HOSPITAL ENCOUNTER (EMERGENCY)
Age: 79
Discharge: HOME | End: 2019-03-10
Attending: EMERGENCY MEDICINE
Payer: MEDICARE

## 2019-03-10 VITALS
TEMPERATURE: 97.5 F | BODY MASS INDEX: 31.82 KG/M2 | HEART RATE: 62 BPM | DIASTOLIC BLOOD PRESSURE: 86 MMHG | RESPIRATION RATE: 15 BRPM | SYSTOLIC BLOOD PRESSURE: 145 MMHG | OXYGEN SATURATION: 99 % | WEIGHT: 221.78 LBS

## 2019-03-10 DIAGNOSIS — I71.40 ABDOMINAL AORTIC ANEURYSM (AAA) WITHOUT RUPTURE: ICD-10-CM

## 2019-03-10 DIAGNOSIS — R10.9 BILATERAL FLANK PAIN: Primary | ICD-10-CM

## 2019-03-10 DIAGNOSIS — K57.30 DIVERTICULOSIS OF COLON: ICD-10-CM

## 2019-03-10 LAB
ALBUMIN SERPL-MCNC: 3.5 G/DL (ref 3.5–5)
ALBUMIN/GLOB SERPL: 0.9 {RATIO} (ref 1.1–2.2)
ALP SERPL-CCNC: 80 U/L (ref 45–117)
ALT SERPL-CCNC: 16 U/L (ref 12–78)
ANION GAP SERPL CALC-SCNC: 8 MMOL/L (ref 5–15)
APPEARANCE UR: CLEAR
AST SERPL-CCNC: 16 U/L (ref 15–37)
BACTERIA URNS QL MICRO: NEGATIVE /HPF
BASOPHILS # BLD: 0 K/UL (ref 0–0.1)
BASOPHILS NFR BLD: 1 % (ref 0–1)
BILIRUB SERPL-MCNC: 0.4 MG/DL (ref 0.2–1)
BILIRUB UR QL: NEGATIVE
BUN SERPL-MCNC: 19 MG/DL (ref 6–20)
BUN/CREAT SERPL: 15 (ref 12–20)
CALCIUM SERPL-MCNC: 8.6 MG/DL (ref 8.5–10.1)
CHLORIDE SERPL-SCNC: 103 MMOL/L (ref 97–108)
CO2 SERPL-SCNC: 24 MMOL/L (ref 21–32)
COLOR UR: ABNORMAL
CREAT SERPL-MCNC: 1.26 MG/DL (ref 0.7–1.3)
DIFFERENTIAL METHOD BLD: NORMAL
EOSINOPHIL # BLD: 0.1 K/UL (ref 0–0.4)
EOSINOPHIL NFR BLD: 2 % (ref 0–7)
EPITH CASTS URNS QL MICRO: ABNORMAL /LPF
ERYTHROCYTE [DISTWIDTH] IN BLOOD BY AUTOMATED COUNT: 13.7 % (ref 11.5–14.5)
GLOBULIN SER CALC-MCNC: 4.1 G/DL (ref 2–4)
GLUCOSE SERPL-MCNC: 115 MG/DL (ref 65–100)
GLUCOSE UR STRIP.AUTO-MCNC: NEGATIVE MG/DL
HCT VFR BLD AUTO: 39.6 % (ref 36.6–50.3)
HGB BLD-MCNC: 13.2 G/DL (ref 12.1–17)
HGB UR QL STRIP: NEGATIVE
HYALINE CASTS URNS QL MICRO: ABNORMAL /LPF (ref 0–5)
IMM GRANULOCYTES # BLD AUTO: 0 K/UL (ref 0–0.04)
IMM GRANULOCYTES NFR BLD AUTO: 0 % (ref 0–0.5)
INR PPP: 1.1 (ref 0.9–1.1)
KETONES UR QL STRIP.AUTO: NEGATIVE MG/DL
LEUKOCYTE ESTERASE UR QL STRIP.AUTO: NEGATIVE
LIPASE SERPL-CCNC: 67 U/L (ref 73–393)
LYMPHOCYTES # BLD: 2.2 K/UL (ref 0.8–3.5)
LYMPHOCYTES NFR BLD: 44 % (ref 12–49)
MCH RBC QN AUTO: 31.7 PG (ref 26–34)
MCHC RBC AUTO-ENTMCNC: 33.3 G/DL (ref 30–36.5)
MCV RBC AUTO: 95 FL (ref 80–99)
MONOCYTES # BLD: 0.4 K/UL (ref 0–1)
MONOCYTES NFR BLD: 9 % (ref 5–13)
NEUTS SEG # BLD: 2.1 K/UL (ref 1.8–8)
NEUTS SEG NFR BLD: 44 % (ref 32–75)
NITRITE UR QL STRIP.AUTO: NEGATIVE
NRBC # BLD: 0 K/UL (ref 0–0.01)
NRBC BLD-RTO: 0 PER 100 WBC
PH UR STRIP: 7.5 [PH] (ref 5–8)
PLATELET # BLD AUTO: 267 K/UL (ref 150–400)
PMV BLD AUTO: 10.5 FL (ref 8.9–12.9)
POTASSIUM SERPL-SCNC: 4 MMOL/L (ref 3.5–5.1)
PROT SERPL-MCNC: 7.6 G/DL (ref 6.4–8.2)
PROT UR STRIP-MCNC: ABNORMAL MG/DL
PROTHROMBIN TIME: 10.8 SEC (ref 9–11.1)
RBC # BLD AUTO: 4.17 M/UL (ref 4.1–5.7)
RBC #/AREA URNS HPF: ABNORMAL /HPF (ref 0–5)
SODIUM SERPL-SCNC: 135 MMOL/L (ref 136–145)
SP GR UR REFRACTOMETRY: 1.01 (ref 1–1.03)
UROBILINOGEN UR QL STRIP.AUTO: 0.2 EU/DL (ref 0.2–1)
WBC # BLD AUTO: 4.8 K/UL (ref 4.1–11.1)
WBC URNS QL MICRO: ABNORMAL /HPF (ref 0–4)

## 2019-03-10 PROCEDURE — 96374 THER/PROPH/DIAG INJ IV PUSH: CPT

## 2019-03-10 PROCEDURE — 36415 COLL VENOUS BLD VENIPUNCTURE: CPT

## 2019-03-10 PROCEDURE — 81001 URINALYSIS AUTO W/SCOPE: CPT

## 2019-03-10 PROCEDURE — 85025 COMPLETE CBC W/AUTO DIFF WBC: CPT

## 2019-03-10 PROCEDURE — 83690 ASSAY OF LIPASE: CPT

## 2019-03-10 PROCEDURE — 99285 EMERGENCY DEPT VISIT HI MDM: CPT

## 2019-03-10 PROCEDURE — 80053 COMPREHEN METABOLIC PANEL: CPT

## 2019-03-10 PROCEDURE — 96375 TX/PRO/DX INJ NEW DRUG ADDON: CPT

## 2019-03-10 PROCEDURE — 85610 PROTHROMBIN TIME: CPT

## 2019-03-10 PROCEDURE — 74011250636 HC RX REV CODE- 250/636: Performed by: EMERGENCY MEDICINE

## 2019-03-10 PROCEDURE — 74176 CT ABD & PELVIS W/O CONTRAST: CPT

## 2019-03-10 RX ORDER — HYDROCODONE BITARTRATE AND ACETAMINOPHEN 5; 325 MG/1; MG/1
1 TABLET ORAL
Qty: 18 TAB | Refills: 0 | Status: SHIPPED | OUTPATIENT
Start: 2019-03-10 | End: 2019-03-13

## 2019-03-10 RX ORDER — FENTANYL CITRATE 50 UG/ML
50 INJECTION, SOLUTION INTRAMUSCULAR; INTRAVENOUS
Status: COMPLETED | OUTPATIENT
Start: 2019-03-10 | End: 2019-03-10

## 2019-03-10 RX ORDER — ONDANSETRON 2 MG/ML
4 INJECTION INTRAMUSCULAR; INTRAVENOUS
Status: COMPLETED | OUTPATIENT
Start: 2019-03-10 | End: 2019-03-10

## 2019-03-10 RX ADMIN — ONDANSETRON 4 MG: 2 INJECTION INTRAMUSCULAR; INTRAVENOUS at 13:28

## 2019-03-10 RX ADMIN — FENTANYL CITRATE 50 MCG: 50 INJECTION, SOLUTION INTRAMUSCULAR; INTRAVENOUS at 13:28

## 2019-03-10 NOTE — ED PROVIDER NOTES
EMERGENCY DEPARTMENT HISTORY AND PHYSICAL EXAM      Date: 3/10/2019  Patient Name: Irineo Miranda    History of Presenting Illness     Chief Complaint   Patient presents with    Back Pain     assumed care from ems, patient was trying to carry groceries into the house when his back in the middle to lower part started hurting, patient states that when he turns to the right the pain is excruciating once standing the patient can walk, but turning to the left is okay. patient denies any urinary symptoms       History Provided By: Patient    HPI: Irineo Miranda, [de-identified] y.o. male with PMHx significant for HTN, DM, aortic aneurysm, CKD, arrhythmia, presents via EMS to the ED with cc of gradual onset lower back pain, onset ~2 days ago, while pt was laying bed. Pt repots carrying groceries from car to house on 03/09/2019 exacerbated back pain. Pt reports having an arteriogram scheduled for 03/19/2019 with Dr. Katie Gunn. Pt denies recent trauma to back, or pain being similar to past kidney stones. Pt reports leaning to his right exacerbates pain. Pt reports current pain is 5/10 and reports laying down on back relieves. He reports being able to ambulate and stand, but pain is increased when doing so. He also reports intermittent nausea from back pain. He reports smoking tobacco from pipe, but denies smoking cigarettes or drinking alcohol. He also c/o new onset cough today, with clear sputum. Pt denies fever, chills, abdominal pain, urinary sxs, circulation of RLE, BLE pain, or SOB. There are no other complaints, changes, or physical findings at this time. PCP: Dianne Steiner MD    No current facility-administered medications on file prior to encounter. Current Outpatient Medications on File Prior to Encounter   Medication Sig Dispense Refill    pioglitazone (ACTOS) 15 mg tablet Take 15 mg by mouth daily.  multivitamin (ONE A DAY) tablet Take 1 Tab by mouth daily.       aspirin 81 mg chewable tablet Take 1 Tab by mouth daily. 30 Tab 0    lisinopril (PRINIVIL, ZESTRIL) 40 mg tablet       metoprolol tartrate (LOPRESSOR) 50 mg tablet Take 50 mg by mouth two (2) times a day.  glimepiride (AMARYL) 2 mg tablet Take 2 mg by mouth two (2) times a day.  diphenhydrAMINE (BENADRYL) 25 mg capsule Take 25 mg by mouth every six (6) hours as needed for Sleep.  simvastatin (ZOCOR) 40 mg tablet Take 40 mg by mouth nightly.  pantoprazole (PROTONIX) 40 mg tablet Take 1 Tab by mouth daily. 30 Tab 0       Past History     Past Medical History:  Past Medical History:   Diagnosis Date    Aneurysm ([facility]     aortic    Arrhythmia     A-fib    Calculus of kidney     Cancer ([facility]     Chronic kidney disease     stone    Diabetes ([facility]     Fast heart beat     Former tobacco use     Hypertension     Muscle weakness     Prostate disorder     Stomach pain        Past Surgical History:  Past Surgical History:   Procedure Laterality Date    ABDOMEN SURGERY PROC UNLISTED      Whipple Procedure    HX ORTHOPAEDIC      neck surgery    HX OTHER SURGICAL  1/6/2015    Whipple procedure, G tube, J tube for duodenal cancer    UPPER GI ENDOSCOPY,BALL DIL,30MM  12/2/2014         UPPER GI ENDOSCOPY,BIOPSY  11/26/2014         UPPER GI ENDOSCOPY,BIOPSY  12/2/2014         UPPER GI ENDOSCOPY,DIAGNOSIS  1/23/2015            Family History:  Family History   Problem Relation Age of Onset    Cancer Mother     Heart Disease Mother     Stroke Father     Cancer Brother     Diabetes Brother     Heart Disease Brother        Social History:  Social History     Tobacco Use    Smoking status: Former Smoker     Packs/day: 0.00     Years: 60.00     Pack years: 0.00    Smokeless tobacco: Never Used    Tobacco comment: pipe and cigar use currently   Substance Use Topics    Alcohol use:  Yes     Alcohol/week: 0.0 oz     Comment: 1 shot per month    Drug use: No       Allergies:  No Known Allergies      Review of Systems   Review of Systems   Constitutional: Negative for chills and fever. HENT: Negative for congestion. Eyes: Negative. Respiratory: Positive for cough. Negative for shortness of breath. Gastrointestinal: Positive for nausea. Negative for abdominal pain. Endocrine: Negative for heat intolerance. Genitourinary: Positive for flank pain. Musculoskeletal: Positive for back pain. Negative for myalgias. Skin: Negative for rash. Allergic/Immunologic: Negative for immunocompromised state. Neurological: Negative for dizziness. Hematological: Does not bruise/bleed easily. Psychiatric/Behavioral: Negative. All other systems reviewed and are negative. Physical Exam   Physical Exam   Constitutional: He is oriented to person, place, and time. He appears well-developed and well-nourished. No distress. HENT:   Head: Normocephalic and atraumatic. Eyes: EOM are normal. Pupils are equal, round, and reactive to light. Neck: Normal range of motion. Neck supple. Cardiovascular: Normal rate, regular rhythm and normal heart sounds. Pulmonary/Chest: Effort normal and breath sounds normal. He has no wheezes. Abdominal: Soft. Bowel sounds are normal.   LUQ tenderness   Musculoskeletal: Normal range of motion. He exhibits no edema or tenderness. Lower thoracic and upper lumbar tenderness; decrease ROM of legs secondary to back pain; no edema or tenderness of legs   Neurological: He is alert and oriented to person, place, and time. No cranial nerve deficit. Sensory intact   Skin: Skin is warm and dry. Psychiatric: He has a normal mood and affect. His behavior is normal.   Nursing note and vitals reviewed.       Diagnostic Study Results     Labs -     Recent Results (from the past 12 hour(s))   CBC WITH AUTOMATED DIFF    Collection Time: 03/10/19  1:38 PM   Result Value Ref Range    WBC 4.8 4.1 - 11.1 K/uL    RBC 4.17 4.10 - 5.70 M/uL    HGB 13.2 12.1 - 17.0 g/dL    HCT 39.6 36.6 - 50.3 %    MCV 95.0 80.0 - 99.0 FL    MCH 31.7 26.0 - 34.0 PG    MCHC 33.3 30.0 - 36.5 g/dL    RDW 13.7 11.5 - 14.5 %    PLATELET 430 151 - 898 K/uL    MPV 10.5 8.9 - 12.9 FL    NRBC 0.0 0  WBC    ABSOLUTE NRBC 0.00 0.00 - 0.01 K/uL    NEUTROPHILS 44 32 - 75 %    LYMPHOCYTES 44 12 - 49 %    MONOCYTES 9 5 - 13 %    EOSINOPHILS 2 0 - 7 %    BASOPHILS 1 0 - 1 %    IMMATURE GRANULOCYTES 0 0.0 - 0.5 %    ABS. NEUTROPHILS 2.1 1.8 - 8.0 K/UL    ABS. LYMPHOCYTES 2.2 0.8 - 3.5 K/UL    ABS. MONOCYTES 0.4 0.0 - 1.0 K/UL    ABS. EOSINOPHILS 0.1 0.0 - 0.4 K/UL    ABS. BASOPHILS 0.0 0.0 - 0.1 K/UL    ABS. IMM. GRANS. 0.0 0.00 - 0.04 K/UL    DF AUTOMATED     PROTHROMBIN TIME + INR    Collection Time: 03/10/19  1:38 PM   Result Value Ref Range    INR 1.1 0.9 - 1.1      Prothrombin time 10.8 9.0 - 15.4 sec   METABOLIC PANEL, COMPREHENSIVE    Collection Time: 03/10/19  1:38 PM   Result Value Ref Range    Sodium 135 (L) 136 - 145 mmol/L    Potassium 4.0 3.5 - 5.1 mmol/L    Chloride 103 97 - 108 mmol/L    CO2 24 21 - 32 mmol/L    Anion gap 8 5 - 15 mmol/L    Glucose 115 (H) 65 - 100 mg/dL    BUN 19 6 - 20 MG/DL    Creatinine 1.26 0.70 - 1.30 MG/DL    BUN/Creatinine ratio 15 12 - 20      GFR est AA >60 >60 ml/min/1.73m2    GFR est non-AA 55 (L) >60 ml/min/1.73m2    Calcium 8.6 8.5 - 10.1 MG/DL    Bilirubin, total 0.4 0.2 - 1.0 MG/DL    ALT (SGPT) 16 12 - 78 U/L    AST (SGOT) 16 15 - 37 U/L    Alk.  phosphatase 80 45 - 117 U/L    Protein, total 7.6 6.4 - 8.2 g/dL    Albumin 3.5 3.5 - 5.0 g/dL    Globulin 4.1 (H) 2.0 - 4.0 g/dL    A-G Ratio 0.9 (L) 1.1 - 2.2     LIPASE    Collection Time: 03/10/19  1:38 PM   Result Value Ref Range    Lipase 67 (L) 73 - 393 U/L   URINALYSIS W/ RFLX MICROSCOPIC    Collection Time: 03/10/19  5:47 PM   Result Value Ref Range    Color YELLOW/STRAW      Appearance CLEAR CLEAR      Specific gravity 1.010 1.003 - 1.030      pH (UA) 7.5 5.0 - 8.0      Protein TRACE (A) NEG mg/dL    Glucose NEGATIVE  NEG mg/dL    Ketone NEGATIVE  NEG mg/dL    Bilirubin NEGATIVE  NEG      Blood NEGATIVE  NEG      Urobilinogen 0.2 0.2 - 1.0 EU/dL    Nitrites NEGATIVE  NEG      Leukocyte Esterase NEGATIVE  NEG      WBC 0-4 0 - 4 /hpf    RBC 0-5 0 - 5 /hpf    Epithelial cells FEW FEW /lpf    Bacteria NEGATIVE  NEG /hpf    Hyaline cast 0-2 0 - 5 /lpf       Radiologic Studies -   CT ABD PELV WO CONT   Final Result   IMPRESSION:      1. No CT evidence for urinary obstruction at this time. Nonobstructing left   intrarenal calculi. 2. Diverticulosis without diverticulitis. 3. Postoperative changes status post Whipple procedure with no obvious acute   change. 4. Stable 3.2 cm abdominal aortic aneurysm with small focal dissection which   appears chronic. 5. Other incidentals as described. CT Results  (Last 48 hours)               03/10/19 1356  CT ABD PELV WO CONT Final result    Impression:  IMPRESSION:       1. No CT evidence for urinary obstruction at this time. Nonobstructing left   intrarenal calculi. 2. Diverticulosis without diverticulitis. 3. Postoperative changes status post Whipple procedure with no obvious acute   change. 4. Stable 3.2 cm abdominal aortic aneurysm with small focal dissection which   appears chronic. 5. Other incidentals as described. Narrative:  EXAM: CT ABD PELV WO CONT       INDICATION: Right Flank pain, stone disease suspected, status post Whipple   procedure for duodenal cancer. COMPARISON: 2/18/2019       CONTRAST:  None. TECHNIQUE:    Thin axial images were obtained through the abdomen and pelvis. Coronal and   sagittal reconstructions were generated. Oral contrast was not administered. CT   dose reduction was achieved through use of a standardized protocol tailored for   this examination and automatic exposure control for dose modulation. The absence of intravenous contrast material reduces the sensitivity for   evaluation of the solid parenchymal organs of the abdomen.         FINDINGS: LUNG BASES: Clear. INCIDENTALLY IMAGED HEART AND MEDIASTINUM: Unremarkable. LIVER: Pneumobilia but no mass or biliary dilatation. GALLBLADDER: The gallbladder surgically absent. SPLEEN: No mass. PANCREAS: The pancreatic head may been resected during Whipple procedure. Surgical clips lie in the peripancreatic region and the gallbladder fossa. ADRENALS: Unremarkable. KIDNEYS/URETERS: No large mass, obstructing calculus or hydronephrosis. Subcentimeter right upper pole renal cortical mass is hyperdense and may   represent a small hyperdense cyst but is too small to characterize with imaging. Nonobstructing tiny calcifications are noted in the left kidney. STOMACH: Unremarkable. SMALL BOWEL: No dilatation or wall thickening. COLON: Numerous diverticula severe in the sigmoid colon with no associated   inflammatory change at any level. APPENDIX: Unremarkable. PERITONEUM: No ascites or pneumoperitoneum. RETROPERITONEUM: The infrarenal upper abdominal aorta measures 3.2 cm in caliber   with chronic appearing focal dissection. This is stable. REPRODUCTIVE ORGANS: The prostate gland is enlarged and impresses into the   bladder base. URINARY BLADDER: No mass or calculus. BONES: No destructive bone lesion. ADDITIONAL COMMENTS: Incidentally noted small inguinal hernias containing fat   only. CXR Results  (Last 48 hours)    None            Medical Decision Making   I am the first provider for this patient. I reviewed the vital signs, available nursing notes, past medical history, past surgical history, family history and social history. Vital Signs-Reviewed the patient's vital signs.   Patient Vitals for the past 12 hrs:   Temp Pulse Resp BP SpO2   03/10/19 1730 97.5 °F (36.4 °C)  16 145/81 93 %   03/10/19 1711     98 %   03/10/19 1709    127/69    03/10/19 1630    141/71 97 %   03/10/19 1330  66  140/81 98 %   03/10/19 1300  65  150/78 99 %   03/10/19 1240 97.7 °F (36.5 °C) 63 18 (!) 165/103 99 %       Pulse Oximetry Analysis - 99% on RA    Cardiac Monitor:   Rate: 63 bpm  Rhythm: Normal Sinus Rhythm 1240     Records Reviewed: Nursing Notes, Old Medical Records and Ambulance Run Sheet    Provider Notes (Medical Decision Making):   DDx: herniated disc, arthritis, spinal stenosis, UTI, kidney stones, muscular skeletal    ED Course:   Initial assessment performed. The patients presenting problems have been discussed, and they are in agreement with the care plan formulated and outlined with them. I have encouraged them to ask questions as they arise throughout their visit. Progress Note:  3:31 PM  Pt reports pain is down to a 2/10 on pain scale. Disposition:  Discharge Note:  6:43 PM  The pt is ready for discharge. The pt's signs, symptoms, diagnosis, and discharge instructions have been discussed and pt has conveyed their understanding. The pt is to follow up as recommended or return to ER should their symptoms worsen. Plan has been discussed and pt is in agreement. PLAN:  1. Current Discharge Medication List      START taking these medications    Details   HYDROcodone-acetaminophen (NORCO) 5-325 mg per tablet Take 1 Tab by mouth every four (4) hours as needed for Pain for up to 3 days. Max Daily Amount: 6 Tabs. Qty: 18 Tab, Refills: 0    Associated Diagnoses: Bilateral flank pain           2. Follow-up Information     Follow up With Specialties Details Why Contact Info    Ciara Anguiano MD Vascular Surgery  as scheduled, and regarding your aneurysm [id number] [phone]      Berenice Nguyen MD Family Practice In 2 days As needed [address] [address]  [phone]      [facility] EMERGENCY DEPT Emergency Medicine  If symptoms worsen [address]  [address] [facility]  [phone]        Return to ED if worse     Diagnosis     Clinical Impression:   1.  Bilateral flank pain    2. Abdominal aortic aneurysm (AAA) without rupture ([facility]    3. Diverticulosis of colon        Attestations: This note is prepared by Santa Duncan, acting as Scribe for MD Jarred Shaw MD: The scribe's documentation has been prepared under my direction and personally reviewed by me in its entirety. I confirm that the note above accurately reflects all work, treatment, procedures, and medical decision making performed by me.

## 2019-03-10 NOTE — DISCHARGE INSTRUCTIONS
Patient Education        Abdominal Aortic Aneurysm: Care Instructions  Your Care Instructions  An abdominal aortic aneurysm is a stretched and bulging area of the aorta. The aorta is the large blood vessel that takes oxygen-rich blood from the heart to the rest of the body. This type of aneurysm is in the belly, where the aorta takes blood to the lower body. If an aneurysm gets too big, it can cause serious problems. A bulging aorta is weak and can burst, or rupture. This causes life-threatening bleeding. If your doctor has determined that your aneurysm is small and not growing fast, it is safe to watch the aneurysm carefully and wait on surgery. If the aneurysm is larger, surgery may be the safest choice. In some cases, your doctor may be able to put in a type of graft, called a stent, to fix the aneurysm without doing major surgery. Follow-up care is a key part of your treatment and safety. Be sure to make and go to all appointments, and call your doctor if you are having problems. It's also a good idea to know your test results and keep a list of the medicines you take. How can you care for yourself at home? · Take your medicines exactly as prescribed. Call your doctor if you think you are having a problem with your medicine. You may take a medicine to lower your blood pressure. This lowers stress on your aorta. If you have high cholesterol, you also may take a statin medicine. · Follow a heart-healthy diet. ? Eat lots of fruits and vegetables, whole grains, fish, and low-fat or nonfat dairy foods. ? Eat lean meats. Limit saturated fat and avoid trans fat. ? Limit processed food, sodium, alcohol, and sweets. · If your doctor recommends it, get more exercise. Walking is a good choice. Bit by bit, increase the amount you walk every day. Try for at least 30 minutes on most days of the week. · Talk to your doctor about when you can do more active workouts. · Manage your weight.  Being at a healthy weight will not likely change your aortic aneurysm, but it may lower the chance of complications if you ever need surgery. · Do not smoke or allow others to smoke around you. Smoking can make your condition worse. If you need help quitting, talk to your doctor about stop-smoking programs and medicines. These can increase your chances of quitting for good. When should you call for help? Call 911 anytime you think you may need emergency care. For example, call if:    · You have severe pain in your belly, back, or chest.     · You passed out (lost consciousness).     · You have severe trouble breathing.    Call your doctor now or seek immediate medical care if:    · You are dizzy or lightheaded, or you feel like you may faint.     · One or both feet change color, are painful, feel cool, or burn or tingle.    Watch closely for changes in your health, and be sure to contact your doctor if you have any problems. Where can you learn more? Go to http://[email or web address] Resourcessusana.info/. Enter K948 in the search box to learn more about \"Abdominal Aortic Aneurysm: Care Instructions. \"  Current as of: September 26, 2018  Content Version: 11.9  © 3407-7873 [employer]. Care instructions adapted under license by [employer] (which disclaims liability or warranty for this information). If you have questions about a medical condition or this instruction, always ask your healthcare professional. [place] [address] any warranty or liability for your use of this information. Patient Education        Diverticulosis: Care Instructions  Your Care Instructions  In diverticulosis, pouches called diverticula form in the wall of the large intestine (colon). The pouches do not cause any pain or other symptoms. Most people who have diverticulosis do not know they have it. But the pouches sometimes bleed, and if they become infected, they can cause pain and other symptoms.  When this happens, it is called diverticulitis. Diverticula form when pressure pushes the wall of the colon outward at certain weak points. A diet that is too low in fiber can cause diverticula. Follow-up care is a key part of your treatment and safety. Be sure to make and go to all appointments, and call your doctor if you are having problems. It's also a good idea to know your test results and keep a list of the medicines you take. How can you care for yourself at home? · Include fruits, leafy green vegetables, beans, and whole grains in your diet each day. These foods are high in fiber. · Take a fiber supplement, such as Citrucel or Metamucil, every day if needed. Read and follow all instructions on the label. · Drink plenty of fluids, enough so that your urine is light yellow or clear like water. If you have kidney, heart, or liver disease and have to limit fluids, talk with your doctor before you increase the amount of fluids you drink. · Get at least 30 minutes of exercise on most days of the week. Walking is a good choice. You also may want to do other activities, such as running, swimming, cycling, or playing tennis or team sports. · Cut out foods that cause gas, pain, or other symptoms. When should you call for help? Call your doctor now or seek immediate medical care if:    · You have belly pain.     · You pass maroon or very bloody stools.     · You have a fever.     · You have nausea and vomiting.     · You have unusual changes in your bowel movements or abdominal swelling.     · You have burning pain when you urinate.     · You have abnormal vaginal discharge.     · You have shoulder pain.     · You have cramping pain that does not get better when you have a bowel movement or pass gas.     · You pass gas or stool from your urethra while urinating.    Watch closely for changes in your health, and be sure to contact your doctor if you have any problems. Where can you learn more?   Go to http://salud-susana.info/. Enter R120 in the search box to learn more about \"Diverticulosis: Care Instructions. \"  Current as of: March 27, 2018  Content Version: 11.9  © 4144-4023 [email or web address]. Care instructions adapted under license by [employer] (which disclaims liability or warranty for this information). If you have questions about a medical condition or this instruction, always ask your healthcare professional. [place] [address] any warranty or liability for your use of this information. Patient Education        Flank Pain: Care Instructions  Your Care Instructions  Flank pain is pain on the side of the back just below the rib cage and above the waist. It can be on one or both sides. Flank pain has many possible causes, including a kidney stone, a urinary tract infection, or back strain. Flank pain may get better on its own. But don't ignore new symptoms, such as fever, nausea and vomiting, urination problems, pain that gets worse, and dizziness. These may be signs of a more serious problem. You may have to have tests or other treatment. Follow-up care is a key part of your treatment and safety. Be sure to make and go to all appointments, and call your doctor if you are having problems. It's also a good idea to know your test results and keep a list of the medicines you take. How can you care for yourself at home? · Rest until you feel better. · Take pain medicines exactly as directed. ? If the doctor gave you a prescription medicine for pain, take it as prescribed. ? If you are not taking a prescription pain medicine, ask your doctor if you can take an over-the-counter pain medicine, such as acetaminophen (Tylenol), ibuprofen (Advil, Motrin), or naproxen (Aleve). Read and follow all instructions on the label. · If your doctor prescribed antibiotics, take them as directed. Do not stop taking them just because you feel better.  You need to take the full course of antibiotics. · To apply heat, put a warm water bottle, a heating pad set on low, or a warm cloth on the painful area. Do not go to sleep with a heating pad on your skin. · To prevent dehydration, drink plenty of fluids, enough so that your urine is light yellow or clear like water. Choose water and other caffeine-free clear liquids until you feel better. If you have kidney, heart, or liver disease and have to limit fluids, talk with your doctor before you increase the amount of fluids you drink. When should you call for help? Call your doctor now or seek immediate medical care if:    · Your flank pain gets worse.     · You have new symptoms, such as fever, nausea, or vomiting.     · You have symptoms of a urinary problem. For example:  ? You have blood or pus in your urine. ? You have chills or body aches. ? It hurts to urinate. ? You have groin or belly pain.    Watch closely for changes in your health, and be sure to contact your doctor if you do not get better as expected. Where can you learn more? Go to http://salud-susana.info/. Enter S191 in the search box to learn more about \"Flank Pain: Care Instructions. \"  Current as of: September 23, 2018  Content Version: 11.9  © 3426-0259 [employer]. Care instructions adapted under license by [employer] (which disclaims liability or warranty for this information). If you have questions about a medical condition or this instruction, always ask your healthcare professional. [place] [address] any warranty or liability for your use of this information.

## 2019-03-10 NOTE — ED NOTES
Pt discharged by provider (Dr. Albert Chavira. Anjali) who reviewed all discharge instructions with pt. This nurse reviewed understanding of discharge instructions and answered all additional questions. Reviewed sign/symptoms/concerns to return for; pt verbalized understanding. No signs of distress noted at time of discharge. Pt's son called to get pt.

## 2020-07-02 ENCOUNTER — HOSPITAL ENCOUNTER (EMERGENCY)
Age: 80
Discharge: HOME | End: 2020-07-03
Attending: EMERGENCY MEDICINE
Payer: MEDICARE

## 2020-07-02 DIAGNOSIS — I95.1 ORTHOSTATIC HYPOTENSION: ICD-10-CM

## 2020-07-02 DIAGNOSIS — R61 DIAPHORESIS: ICD-10-CM

## 2020-07-02 DIAGNOSIS — R53.1 WEAKNESS: Primary | ICD-10-CM

## 2020-07-02 LAB
ALBUMIN SERPL-MCNC: 3.3 G/DL (ref 3.5–5)
ALBUMIN/GLOB SERPL: 0.8 {RATIO} (ref 1.1–2.2)
ALP SERPL-CCNC: 82 U/L (ref 45–117)
ALT SERPL-CCNC: 14 U/L (ref 12–78)
ANION GAP SERPL CALC-SCNC: 9 MMOL/L (ref 5–15)
APPEARANCE UR: CLEAR
AST SERPL-CCNC: 24 U/L (ref 15–37)
BACTERIA URNS QL MICRO: NEGATIVE /HPF
BASOPHILS # BLD: 0 K/UL (ref 0–0.1)
BASOPHILS NFR BLD: 1 % (ref 0–1)
BILIRUB SERPL-MCNC: 0.5 MG/DL (ref 0.2–1)
BILIRUB UR QL CFM: NEGATIVE
BUN SERPL-MCNC: 17 MG/DL (ref 6–20)
BUN/CREAT SERPL: 13 (ref 12–20)
CALCIUM SERPL-MCNC: 8.8 MG/DL (ref 8.5–10.1)
CHLORIDE SERPL-SCNC: 106 MMOL/L (ref 97–108)
CO2 SERPL-SCNC: 22 MMOL/L (ref 21–32)
COLOR UR: ABNORMAL
CREAT SERPL-MCNC: 1.34 MG/DL (ref 0.7–1.3)
DIFFERENTIAL METHOD BLD: ABNORMAL
EOSINOPHIL # BLD: 0.1 K/UL (ref 0–0.4)
EOSINOPHIL NFR BLD: 2 % (ref 0–7)
EPITH CASTS URNS QL MICRO: ABNORMAL /LPF
ERYTHROCYTE [DISTWIDTH] IN BLOOD BY AUTOMATED COUNT: 14.6 % (ref 11.5–14.5)
GLOBULIN SER CALC-MCNC: 4.2 G/DL (ref 2–4)
GLUCOSE BLD STRIP.AUTO-MCNC: 173 MG/DL (ref 65–100)
GLUCOSE SERPL-MCNC: 160 MG/DL (ref 65–100)
GLUCOSE UR STRIP.AUTO-MCNC: NEGATIVE MG/DL
HCT VFR BLD AUTO: 44.4 % (ref 36.6–50.3)
HGB BLD-MCNC: 14.8 G/DL (ref 12.1–17)
HGB UR QL STRIP: NEGATIVE
HYALINE CASTS URNS QL MICRO: ABNORMAL /LPF (ref 0–5)
IMM GRANULOCYTES # BLD AUTO: 0 K/UL (ref 0–0.04)
IMM GRANULOCYTES NFR BLD AUTO: 0 % (ref 0–0.5)
KETONES UR QL STRIP.AUTO: NEGATIVE MG/DL
LACTATE SERPL-SCNC: 1.8 MMOL/L (ref 0.4–2)
LEUKOCYTE ESTERASE UR QL STRIP.AUTO: NEGATIVE
LYMPHOCYTES # BLD: 1.7 K/UL (ref 0.8–3.5)
LYMPHOCYTES NFR BLD: 38 % (ref 12–49)
MCH RBC QN AUTO: 31 PG (ref 26–34)
MCHC RBC AUTO-ENTMCNC: 33.3 G/DL (ref 30–36.5)
MCV RBC AUTO: 92.9 FL (ref 80–99)
MONOCYTES # BLD: 0.5 K/UL (ref 0–1)
MONOCYTES NFR BLD: 12 % (ref 5–13)
NEUTS SEG # BLD: 2.2 K/UL (ref 1.8–8)
NEUTS SEG NFR BLD: 48 % (ref 32–75)
NITRITE UR QL STRIP.AUTO: NEGATIVE
NRBC # BLD: 0 K/UL (ref 0–0.01)
NRBC BLD-RTO: 0 PER 100 WBC
PH UR STRIP: 6 [PH] (ref 5–8)
PLATELET # BLD AUTO: 248 K/UL (ref 150–400)
PMV BLD AUTO: 11.3 FL (ref 8.9–12.9)
POTASSIUM SERPL-SCNC: 3.8 MMOL/L (ref 3.5–5.1)
PROT SERPL-MCNC: 7.5 G/DL (ref 6.4–8.2)
PROT UR STRIP-MCNC: 100 MG/DL
RBC # BLD AUTO: 4.78 M/UL (ref 4.1–5.7)
RBC #/AREA URNS HPF: ABNORMAL /HPF (ref 0–5)
SERVICE CMNT-IMP: ABNORMAL
SODIUM SERPL-SCNC: 137 MMOL/L (ref 136–145)
SP GR UR REFRACTOMETRY: 1.01 (ref 1–1.03)
UA: UC IF INDICATED,UAUC: ABNORMAL
UROBILINOGEN UR QL STRIP.AUTO: 1 EU/DL (ref 0.2–1)
WBC # BLD AUTO: 4.6 K/UL (ref 4.1–11.1)
WBC URNS QL MICRO: ABNORMAL /HPF (ref 0–4)

## 2020-07-02 PROCEDURE — 96360 HYDRATION IV INFUSION INIT: CPT

## 2020-07-02 PROCEDURE — 80053 COMPREHEN METABOLIC PANEL: CPT

## 2020-07-02 PROCEDURE — 93005 ELECTROCARDIOGRAM TRACING: CPT

## 2020-07-02 PROCEDURE — 81001 URINALYSIS AUTO W/SCOPE: CPT

## 2020-07-02 PROCEDURE — 85025 COMPLETE CBC W/AUTO DIFF WBC: CPT

## 2020-07-02 PROCEDURE — 82962 GLUCOSE BLOOD TEST: CPT

## 2020-07-02 PROCEDURE — 99285 EMERGENCY DEPT VISIT HI MDM: CPT

## 2020-07-02 PROCEDURE — 83605 ASSAY OF LACTIC ACID: CPT

## 2020-07-02 PROCEDURE — 36415 COLL VENOUS BLD VENIPUNCTURE: CPT

## 2020-07-03 VITALS
DIASTOLIC BLOOD PRESSURE: 88 MMHG | OXYGEN SATURATION: 94 % | HEART RATE: 79 BPM | RESPIRATION RATE: 18 BRPM | BODY MASS INDEX: 31.5 KG/M2 | SYSTOLIC BLOOD PRESSURE: 167 MMHG | WEIGHT: 220 LBS | HEIGHT: 70 IN | TEMPERATURE: 98 F

## 2020-07-03 LAB
ATRIAL RATE: 82 BPM
CALCULATED P AXIS, ECG09: 77 DEGREES
CALCULATED R AXIS, ECG10: 64 DEGREES
CALCULATED T AXIS, ECG11: 121 DEGREES
DIAGNOSIS, 93000: NORMAL
P-R INTERVAL, ECG05: 268 MS
Q-T INTERVAL, ECG07: 404 MS
QRS DURATION, ECG06: 96 MS
QTC CALCULATION (BEZET), ECG08: 472 MS
VENTRICULAR RATE, ECG03: 82 BPM

## 2020-07-03 PROCEDURE — 74011250636 HC RX REV CODE- 250/636: Performed by: EMERGENCY MEDICINE

## 2020-07-03 RX ADMIN — SODIUM CHLORIDE 1000 ML: 900 INJECTION, SOLUTION INTRAVENOUS at 00:19

## 2020-07-03 NOTE — ED TRIAGE NOTES
Pt arrived by EMS. Pt called emergency services because he could not get off of the commode. States that patient seemed 'off'.  Pt presents alert and orientated X 4

## 2020-07-03 NOTE — DISCHARGE INSTRUCTIONS
Orthostatic Hypotension: Care Instructions  Your Care Instructions     Orthostatic hypotension is a quick drop in blood pressure. It happens when you get up from sitting or lying down. You may feel faint, lightheaded, or dizzy. When a person sits up or stands up, the body changes the way it pumps blood. This can slow the flow of blood to the brain for a very short time. And that can make you feel lightheaded. Many medicines can cause this problem, especially in older people. Lack of fluids (dehydration) or illnesses such as diabetes or heart disease also can cause it. Follow-up care is a key part of your treatment and safety. Be sure to make and go to all appointments, and call your doctor if you are having problems. It's also a good idea to know your test results and keep a list of the medicines you take. How can you care for yourself at home? · Be safe with medicines. Call your doctor if you think you are having a problem with your medicine. You will get more details on the specific medicines your doctor prescribes. · If you feel dizzy or lightheaded, sit down or lie down for a few minutes. Or you can sit down and put your head between your knees. This will help your blood pressure go back to normal and help your symptoms go away. · Follow your doctor's suggestions for ways to prevent symptoms like dizziness. These suggestions may include:  ? Get up slowly from bed or after sitting for a long time. If you are in bed, roll to your side and swing your legs over the edge of the bed and onto the floor. Push your body up to a sitting position. Wait for a while before you slowly stand up.  ? Add more salt to your diet, if your doctor recommends it. ? Drink plenty of fluids, enough so that your urine is light yellow or clear like water. Choose water and other caffeine-free clear liquids.  If you have kidney, heart, or liver disease and have to limit fluids, talk with your doctor before you increase the amount of fluids you drink. ? Avoid or limit alcohol to 2 drinks a day for men and 1 drink a day for women. Alcohol may interfere with your medicine. In addition, alcohol can make your low blood pressure worse by causing your body to lose water. ? Avoid or limit caffeine. Caffeine can cause your body to lose water. ? Wear compression stockings to help improve blood flow. When should you call for help? [id number] anytime you think you may need emergency care. For example, call if:  · You passed out (lost consciousness). Watch closely for changes in your health, and be sure to contact your doctor if:  · You do not get better as expected. Where can you learn more? Go to http://www.walker.com/  Enter V984 in the search box to learn more about \"Orthostatic Hypotension: Care Instructions. \"  Current as of: December 16, 2019               Content Version: 12.5  © 6387-6792 [employer]. Care instructions adapted under license by [employer] (which disclaims liability or warranty for this information). If you have questions about a medical condition or this instruction, always ask your healthcare professional. [place] [address] any warranty or liability for your use of this information. Patient Education        Abnormal Sweating: Care Instructions  Your Care Instructions     Sweating is your body's way of cooling down and getting rid of some chemicals. But some people have a condition that makes them sweat too much. It can affect any part of your body, especially the head, armpits, hands, and feet. Sometimes the sweat mixes with bacteria on your skin and causes armpits and feet to smell bad. It can be upsetting to have sweat drip from your face and palms or to have smelly feet and shoes. Some people seem to be born with this condition, while some others may sweat too much because of anxiety.  You may be able to reduce the amount you sweat by lowering stress in your life. Some people find that antiperspirants help, and you can take steps at home that will help with smelly feet. If you still have too much sweating, your doctor may recommend other treatments. Follow-up care is a key part of your treatment and safety. Be sure to make and go to all appointments, and call your doctor if you are having problems. It's also a good idea to know your test results and keep a list of the medicines you take. How can you care for yourself at home? · If your doctor prescribed medicine, use it as directed. Call your doctor if you have any problems with your medicine. You will get more details on the specific medicines your doctor prescribes. · Bathe 1 or 2 times a day with soap and water. Do not scrub your skin too much, because that can irritate it. Dry your skin well after bathing. · Use a deodorant with antiperspirant. It might help to put it on at night before bed. · Wear clothing made of material that lets your skin breathe. Cotton, wool, silk, and linen are good choices. For exercising, wear material that removes (annamaria) the moisture from your skin. · Keep an extra shirt at work or in a school locker. · Attach pads (underarm or dress shields) to the armpit area of clothing to absorb sweat. You can buy these pads in sports or clothing stores. · Let your shoes dry out for a day after wearing them. If possible, set them in a place where the sun will shine on them. That will help kill the bacteria that cause the smell. · Change your socks at least 1 time a day. Wash your socks after each wearing. · Use foot powder or talc in your shoes and socks and on your feet. Put inserts in your shoes to absorb some of the sweat. Go barefoot for a while each day to let your feet dry out. · Limit hot drinks, such as coffee and tea, which make you sweat more. When should you call for help?   Watch closely for changes in your health, and be sure to contact your doctor if:  · You continue to sweat too much, and it bothers you. Where can you learn more? Go to http://salud-susana.info/  Enter T033 in the search box to learn more about \"Abnormal Sweating: Care Instructions. \"  Current as of: June 26, 2019               Content Version: 12.5  © 8739-8261 [employer]. Care instructions adapted under license by [employer] (which disclaims liability or warranty for this information). If you have questions about a medical condition or this instruction, always ask your healthcare professional. [address] any warranty or liability for your use of this information. Patient Education        Fatigue: Care Instructions  Your Care Instructions     Fatigue is a feeling of tiredness, exhaustion, or lack of energy. You may feel fatigue because of too much or not enough activity. It can also come from stress, lack of sleep, boredom, and poor diet. Many medical problems, such as viral infections, can cause fatigue. Emotional problems, especially depression, are often the cause of fatigue. Fatigue is most often a symptom of another problem. Treatment for fatigue depends on the cause. For example, if you have fatigue because you have a certain health problem, treating this problem also treats your fatigue. If depression or anxiety is the cause, treatment may help. Follow-up care is a key part of your treatment and safety. Be sure to make and go to all appointments, and call your doctor if you are having problems. It's also a good idea to know your test results and keep a list of the medicines you take. How can you care for yourself at home? · Get regular exercise. But don't overdo it. Go back and forth between rest and exercise. · Get plenty of rest.  · Eat a healthy diet. Do not skip meals, especially breakfast.  · Reduce your use of caffeine, tobacco, and alcohol.  Caffeine is most often found in coffee, tea, cola drinks, and chocolate. · Limit medicines that can cause fatigue. This includes tranquilizers and cold and allergy medicines. When should you call for help? Watch closely for changes in your health, and be sure to contact your doctor if:  · You have new symptoms such as fever or a rash. · Your fatigue gets worse. · You have been feeling down, depressed, or hopeless. Or you may have lost interest in things that you usually enjoy. · You are not getting better as expected. Where can you learn more? Go to http://salud-susana.info/  Enter [id number] in the search box to learn more about \"Fatigue: Care Instructions. \"  Current as of: June 26, 2019               Content Version: 12.5  © 1763-3641 [employer]. Care instructions adapted under license by [employer] (which disclaims liability or warranty for this information). If you have questions about a medical condition or this instruction, always ask your healthcare professional. [place] [address] any warranty or liability for your use of this information.

## 2020-07-03 NOTE — ED NOTES
Bedside shift change report given to Hoang RN  (oncoming nurse) by Raad Agudelo RN  (offgoing nurse). Report included the following information SBAR, Kardex and ED Summary. Pt resting in bed comfortably. Hoang RN contacted Yoshi Jitendra, who will be here around 0830 to pick his son up.

## 2020-07-03 NOTE — ED NOTES
Called patient's son Rosalina Cadet at home and cell numbers for discharge ride home, voice mails left on both. Await phone call back. Patient states his keys are locked in his home and he will need his son to get in. Charge RN aware.

## 2020-07-03 NOTE — ED NOTES
Contacted Son a second time for discharge. Leo Jurado stated he was on his way. Provided him with contact information to ED. Provided pt with pants, coffee, socks and call bell. Pt able to sit in lobby and wait for his ride.

## 2020-07-03 NOTE — ED NOTES
Bedside shift change report given to Andrew Wright (oncoming nurse) by Katty Newton (offgoing nurse). Report included the following information SBAR and ED Summary.

## 2020-07-03 NOTE — ED NOTES
Attempted to ambulate at this time. Patient able to get up to use cane, however, endorses dizziness. Dr. Stone Walker notified at this time.

## 2020-07-03 NOTE — ED PROVIDER NOTES
EMERGENCY DEPARTMENT HISTORY AND PHYSICAL EXAM     ----------------------------------------------------------------------------  Please note that this dictation was completed with [employer], the computer voice recognition software. Quite often unanticipated grammatical, syntax, homophones, and other interpretive errors are inadvertently transcribed by the computer software. Please disregard these errors. Please excuse any errors that have escaped final proofreading  ----------------------------------------------------------------------------      Date: 7/2/2020  Patient Name: Gabrielle Prasad    History of Presenting Illness     Chief Complaint   Patient presents with    Altered mental status     called 911 for help off of comode        History Provided By:  Patient    HPI: Gabrielle Prasad is a 78 y.o. male, with significant pmhx of HTN, DM, [place], who presents via EMS to the ED with c/o diaphoresis and weakness. Patient reports he lives at home by himself and was attempting to get off of the commode earlier this evening when he suddenly had onset of diffuse sweating and generalized weakness. Patient reports he was there for approximately 30 minutes when he attempted to contact his son to come help him but was unable to get a hold of him. Noted to call 911 subsequently after that. Patient notes that he was able to eventually get off of the commode and open the door for EMS. Notes that he uses a Rollator to get around the house at times and a cane all the time. Notes that he now feels back to baseline. Denies associated chest pain, nausea, vomiting. Notes having similar symptoms in the past but nothing as severe as this particular episode. Patient arrives in good spirits him with all other complaints, specifically noting he is unsure as to why EMS brought him to the emergency department.   Patient also specifically denies any associated fevers, chills, CP, SOB, nausea, vomiting, diarrhea, abd pain, changes in BM, urinary sxs, or headache. Social Hx: + tobacco  denies EtOH , denies Illicit Drugs    There are no other complaints, changes, or physical findings at this time. PCP: Flynn Amos MD    No Known Allergies    Current Outpatient Medications   Medication Sig Dispense Refill    pioglitazone (ACTOS) 15 mg tablet Take 15 mg by mouth daily.  multivitamin (ONE A DAY) tablet Take 1 Tab by mouth daily.  aspirin 81 mg chewable tablet Take 1 Tab by mouth daily. 30 Tab 0    lisinopril (PRINIVIL, ZESTRIL) 40 mg tablet       metoprolol tartrate (LOPRESSOR) 50 mg tablet Take 50 mg by mouth two (2) times a day.  glimepiride (AMARYL) 2 mg tablet Take 2 mg by mouth two (2) times a day.  diphenhydrAMINE (BENADRYL) 25 mg capsule Take 25 mg by mouth every six (6) hours as needed for Sleep.  simvastatin (ZOCOR) 40 mg tablet Take 40 mg by mouth nightly.  pantoprazole (PROTONIX) 40 mg tablet Take 1 Tab by mouth daily.  30 Tab 0       Past History     Past Medical History:  Past Medical History:   Diagnosis Date    Aneurysm ([facility] [address]     aortic    Arrhythmia     A-fib    Calculus of kidney     Cancer ([facility] [address]     Chronic kidney disease     stone    Diabetes ([facility] [address]     Fast heart beat     Former tobacco use     Hypertension     Muscle weakness     Prostate disorder     Stomach pain        Past Surgical History:  Past Surgical History:   Procedure Laterality Date    ABDOMEN SURGERY PROC UNLISTED      Whipple Procedure    HX ORTHOPAEDIC      neck surgery    HX OTHER SURGICAL  1/6/2015    Whipple procedure, G tube, J tube for duodenal cancer    UPPER GI ENDOSCOPY,BALL DIL,30MM  12/2/2014         UPPER GI ENDOSCOPY,BIOPSY  11/26/2014         UPPER GI ENDOSCOPY,BIOPSY  12/2/2014         UPPER GI ENDOSCOPY,DIAGNOSIS  1/23/2015            Family History:  Family History   Problem Relation Age of Onset    Cancer Mother     Heart Disease Mother     Stroke Father     Cancer Brother     Diabetes Brother     Heart Disease Brother        Social History:  Social History     Tobacco Use    Smoking status: Former Smoker     Packs/day: 0.00     Years: 60.00     Pack years: 0.00    Smokeless tobacco: Never Used    Tobacco comment: pipe and cigar use currently   Substance Use Topics    Alcohol use: Yes     Alcohol/week: 0.0 standard drinks     Comment: 1 shot per month    Drug use: No       Allergies:  No Known Allergies      Review of Systems   Review of Systems   Constitutional: Positive for diaphoresis. Negative for chills and fever. HENT: Negative. Eyes: Negative. Respiratory: Negative for cough, chest tightness and shortness of breath. Cardiovascular: Negative for chest pain and leg swelling. Gastrointestinal: Negative for abdominal pain, diarrhea, nausea and vomiting. Endocrine: Negative. Genitourinary: Negative for difficulty urinating and dysuria. Musculoskeletal: Negative for myalgias. Skin: Negative. Neurological: Positive for weakness (generalized). Psychiatric/Behavioral: Negative. All other systems reviewed and are negative. Physical Exam   Physical Exam  Vitals signs and nursing note reviewed. Constitutional:       General: He is not in acute distress. Appearance: He is well-developed. He is not diaphoretic. Comments: unkempt   HENT:      Head: Normocephalic and atraumatic. Nose: Nose normal.      Mouth/Throat:      Pharynx: No oropharyngeal exudate. Eyes:      Conjunctiva/sclera: Conjunctivae normal.      Pupils: Pupils are equal, round, and reactive to light. Neck:      Musculoskeletal: Normal range of motion and neck supple. Vascular: No JVD. Cardiovascular:      Rate and Rhythm: Normal rate and regular rhythm. Heart sounds: Normal heart sounds. No murmur. No friction rub. Pulmonary:      Effort: Pulmonary effort is normal. No respiratory distress. Breath sounds: Normal breath sounds. No stridor. No wheezing or rales. Abdominal:      General: Bowel sounds are normal. There is no distension. Palpations: Abdomen is soft. Tenderness: There is no abdominal tenderness. There is no rebound. Musculoskeletal: Normal range of motion. General: No tenderness. Skin:     General: Skin is warm and dry. Findings: No rash. Neurological:      Mental Status: He is alert and oriented to person, place, and time. Cranial Nerves: No cranial nerve deficit. Psychiatric:         Speech: Speech normal.         Behavior: Behavior normal.         Thought Content:  Thought content normal.         Judgment: Judgment normal.           Diagnostic Study Results     Labs -     Recent Results (from the past 12 hour(s))   GLUCOSE, POC    Collection Time: 07/02/20  8:54 PM   Result Value Ref Range    Glucose (POC) 173 (H) 65 - 100 mg/dL    Performed by Santana Belle (CORKY)    EKG, 12 LEAD, INITIAL    Collection Time: 07/02/20  9:20 PM   Result Value Ref Range    Ventricular Rate 82 BPM    Atrial Rate 82 BPM    P-R Interval 268 ms    QRS Duration 96 ms    Q-T Interval 404 ms    QTC Calculation (Bezet) 472 ms    Calculated P Axis 77 degrees    Calculated R Axis 64 degrees    Calculated T Axis 121 degrees    Diagnosis       Sinus rhythm with 1st degree AV block  T wave abnormality, consider anterior ischemia  Prolonged QT  When compared with ECG of 11-JUN-2018 23:36,  Nonspecific T wave abnormality now evident in Inferior leads  T wave inversion now evident in Anterior leads     CBC WITH AUTOMATED DIFF    Collection Time: 07/02/20  9:26 PM   Result Value Ref Range    WBC 4.6 4.1 - 11.1 K/uL    RBC 4.78 4.10 - 5.70 M/uL    HGB 14.8 12.1 - 17.0 g/dL    HCT 44.4 36.6 - 50.3 %    MCV 92.9 80.0 - 99.0 FL    MCH 31.0 26.0 - 34.0 PG    MCHC 33.3 30.0 - 36.5 g/dL    RDW 14.6 (H) 11.5 - 14.5 %    PLATELET 555 660 - 665 K/uL    MPV 11.3 8.9 - 12.9 FL    NRBC 0.0 0  WBC    ABSOLUTE NRBC 0.00 0.00 - 0.01 K/uL    NEUTROPHILS 48 32 - 75 % LYMPHOCYTES 38 12 - 49 %    MONOCYTES 12 5 - 13 %    EOSINOPHILS 2 0 - 7 %    BASOPHILS 1 0 - 1 %    IMMATURE GRANULOCYTES 0 0.0 - 0.5 %    ABS. NEUTROPHILS 2.2 1.8 - 8.0 K/UL    ABS. LYMPHOCYTES 1.7 0.8 - 3.5 K/UL    ABS. MONOCYTES 0.5 0.0 - 1.0 K/UL    ABS. EOSINOPHILS 0.1 0.0 - 0.4 K/UL    ABS. BASOPHILS 0.0 0.0 - 0.1 K/UL    ABS. IMM. GRANS. 0.0 0.00 - 0.04 K/UL    DF AUTOMATED     METABOLIC PANEL, COMPREHENSIVE    Collection Time: 07/02/20  9:26 PM   Result Value Ref Range    Sodium 137 136 - 145 mmol/L    Potassium 3.8 3.5 - 5.1 mmol/L    Chloride 106 97 - 108 mmol/L    CO2 22 21 - 32 mmol/L    Anion gap 9 5 - 15 mmol/L    Glucose 160 (H) 65 - 100 mg/dL    BUN 17 6 - 20 MG/DL    Creatinine 1.34 (H) 0.70 - 1.30 MG/DL    BUN/Creatinine ratio 13 12 - 20      GFR est AA >60 >60 ml/min/1.73m2    GFR est non-AA 51 (L) >60 ml/min/1.73m2    Calcium 8.8 8.5 - 10.1 MG/DL    Bilirubin, total 0.5 0.2 - 1.0 MG/DL    ALT (SGPT) 14 12 - 78 U/L    AST (SGOT) 24 15 - 37 U/L    Alk.  phosphatase 82 45 - 117 U/L    Protein, total 7.5 6.4 - 8.2 g/dL    Albumin 3.3 (L) 3.5 - 5.0 g/dL    Globulin 4.2 (H) 2.0 - 4.0 g/dL    A-G Ratio 0.8 (L) 1.1 - 2.2     LACTIC ACID    Collection Time: 07/02/20 10:09 PM   Result Value Ref Range    Lactic acid 1.8 0.4 - 2.0 MMOL/L   URINALYSIS W/ REFLEX CULTURE    Collection Time: 07/02/20 10:51 PM   Result Value Ref Range    Color YELLOW/STRAW      Appearance CLEAR CLEAR      Specific gravity 1.015 1.003 - 1.030      pH (UA) 6.0 5.0 - 8.0      Protein 100 (A) NEG mg/dL    Glucose Negative NEG mg/dL    Ketone Negative NEG mg/dL    Blood Negative NEG      Urobilinogen 1.0 0.2 - 1.0 EU/dL    Nitrites Negative NEG      Leukocyte Esterase Negative NEG      WBC 0-4 0 - 4 /hpf    RBC 0-5 0 - 5 /hpf    Epithelial cells FEW FEW /lpf    Bacteria Negative NEG /hpf    UA:UC IF INDICATED CULTURE NOT INDICATED BY UA RESULT CNI      Hyaline cast 0-2 0 - 5 /lpf   BILIRUBIN, CONFIRM    Collection Time: 07/02/20 10:51 PM   Result Value Ref Range    Bilirubin UA, confirm Negative NEG         Radiologic Studies -   No orders to display     CT Results  (Last 48 hours)    None        CXR Results  (Last 48 hours)    None            Medical Decision Making   I am the first provider for this patient. I reviewed the vital signs, available nursing notes, past medical history, past surgical history, family history and social history. Vital Signs-Reviewed the patient's vital signs. Patient Vitals for the past 12 hrs:   Temp Pulse Resp BP SpO2   07/02/20 2135   18     07/02/20 2059 98 °F (36.7 °C) 84 18 115/67 96 %   07/02/20 2051 98 °F (36.7 °C)  18         Pulse Oximetry Analysis - 96% on RA    Cardiac Monitor:   Rate: 82 bpm  Rhythm: nsr      Provider Notes (Medical Decision Making):     DDX:  UTI, anemia, electrolyte abnormality, dehydration, orthostatic hypotension, hypokalemia hypo-glycemia    Plan:  Labs, UA, chest x-ray    Impression:  Transient weakness    ED Course:   Initial assessment performed. The patients presenting problems have been discussed, and they are in agreement with the care plan formulated and outlined with them. I have encouraged them to ask questions as they arise throughout their visit. I reviewed our electronic medical record system for any past medical records that were available that may contribute to the patients current condition, the nursing notes and and vital signs from today's visit    Nursing notes will be reviewed as they become available in realtime while the pt has been in the ED. Claude Saddler, MD    EKG interpretation 2120: NSR,  Axis, rate nl; KS 82, , QTc 96; 472no acute ischemia; interpreted by Claude Saddler, MD    I personally reviewed/interpreted pt's imaging. Agree with official read by radiology as noted above. Claude Saddler, MD    PROGRESS NOTE:  11:19 PM  Pt without significant findings on blood work. Noting creatinine to be near baseline.   Patient perform ambulation trial and reevaluate. West Hall MD      1:36 AM  Progress note:  Pt noted to be feeling better ready for discharge, no longer orthostatic after IV fluids. Discussed lab and imaging findings with pt, specifically noting no significant findings. Pt will follow up with primary care as instructed. All questions have been answered, pt voiced understanding and agreement with plan. Specific return precautions provided in addition to instructions for pt to return to the ED immediately should sx worsen at any time. Wset Hall MD           Critical Care Time:     none      Diagnosis     Clinical Impression:   1. Weakness    2. Diaphoresis        PLAN:  1. Current Discharge Medication List        2. Follow-up Information     Follow up With Specialties Details Why Contact Info    Verenice Flores MD [facility] Schedule an appointment as soon as possible for a visit in 2 days  [address] [place] [address].  [phone]          Return to ED if worse     Disposition:  1:36 AM  The patient's results have been reviewed with family and/or caregiver. They verbally convey their understanding and agreement of the patient's signs, symptoms, diagnosis, treatment and prognosis and additionally agree to follow up as recommended in the discharge instructions or to return to the Emergency Room should the patient's condition change prior to their follow-up appointment. The family and/or caregiver verbally agrees with the care-plan and all of their questions have been answered. The discharge instructions have also been provided to the them with educational information regarding the patient's diagnosis as well a list of reasons why the patient would want to return to the ER prior to their follow-up appointment should their condition change. West Hall MD            This note will not be viewable in [address]

## 2020-07-03 NOTE — ED NOTES
Bedside shift change report given to [address] (oncoming nurse) by Tarun Castanon (offgoing nurse). Report included the following information SBAR, ED Summary, Recent Results and Cardiac Rhythm NSR/ 1st degree av block.

## 2020-07-03 NOTE — ED NOTES
Bedside and Verbal shift change report given to Tasha Bear RN (oncoming nurse) by Lito Lancaster RN (offgoing nurse). Report included the following information SBAR, ED Summary, MAR and Recent Results.

## 2020-07-03 NOTE — ED NOTES
Monitoring removed and patient helped to position of comfort. Patient resting in stretcher, call bell in reach. Await return call from son for discharge.

## 2021-09-03 ENCOUNTER — APPOINTMENT (OUTPATIENT)
Dept: CT IMAGING | Age: 81
End: 2021-09-03
Attending: EMERGENCY MEDICINE
Payer: MEDICARE

## 2021-09-03 ENCOUNTER — HOSPITAL ENCOUNTER (EMERGENCY)
Age: 81
Discharge: HOME | End: 2021-09-03
Attending: EMERGENCY MEDICINE
Payer: MEDICARE

## 2021-09-03 VITALS
DIASTOLIC BLOOD PRESSURE: 77 MMHG | TEMPERATURE: 98.5 F | SYSTOLIC BLOOD PRESSURE: 137 MMHG | RESPIRATION RATE: 17 BRPM | HEART RATE: 68 BPM | OXYGEN SATURATION: 96 %

## 2021-09-03 DIAGNOSIS — R53.1 WEAKNESS: Primary | ICD-10-CM

## 2021-09-03 LAB
ALBUMIN SERPL-MCNC: 3.2 G/DL (ref 3.5–5)
ALBUMIN/GLOB SERPL: 0.8 {RATIO} (ref 1.1–2.2)
ALP SERPL-CCNC: 77 U/L (ref 45–117)
ALT SERPL-CCNC: 15 U/L (ref 12–78)
ANION GAP SERPL CALC-SCNC: 7 MMOL/L (ref 5–15)
AST SERPL-CCNC: 11 U/L (ref 15–37)
BASOPHILS # BLD: 0 K/UL (ref 0–0.1)
BASOPHILS NFR BLD: 1 % (ref 0–1)
BILIRUB SERPL-MCNC: 0.5 MG/DL (ref 0.2–1)
BUN SERPL-MCNC: 18 MG/DL (ref 6–20)
BUN/CREAT SERPL: 14 (ref 12–20)
CALCIUM SERPL-MCNC: 8.8 MG/DL (ref 8.5–10.1)
CHLORIDE SERPL-SCNC: 102 MMOL/L (ref 97–108)
CO2 SERPL-SCNC: 26 MMOL/L (ref 21–32)
COMMENT, HOLDF: NORMAL
CREAT SERPL-MCNC: 1.25 MG/DL (ref 0.7–1.3)
DIFFERENTIAL METHOD BLD: NORMAL
EOSINOPHIL # BLD: 0.1 K/UL (ref 0–0.4)
EOSINOPHIL NFR BLD: 3 % (ref 0–7)
ERYTHROCYTE [DISTWIDTH] IN BLOOD BY AUTOMATED COUNT: 12.9 % (ref 11.5–14.5)
ETHANOL SERPL-MCNC: <10 MG/DL
GLOBULIN SER CALC-MCNC: 4.2 G/DL (ref 2–4)
GLUCOSE SERPL-MCNC: 255 MG/DL (ref 65–100)
HCT VFR BLD AUTO: 45.6 % (ref 36.6–50.3)
HGB BLD-MCNC: 15.4 G/DL (ref 12.1–17)
IMM GRANULOCYTES # BLD AUTO: 0 K/UL (ref 0–0.04)
IMM GRANULOCYTES NFR BLD AUTO: 0 % (ref 0–0.5)
LYMPHOCYTES # BLD: 1.2 K/UL (ref 0.8–3.5)
LYMPHOCYTES NFR BLD: 24 % (ref 12–49)
MCH RBC QN AUTO: 31.7 PG (ref 26–34)
MCHC RBC AUTO-ENTMCNC: 33.8 G/DL (ref 30–36.5)
MCV RBC AUTO: 93.8 FL (ref 80–99)
MONOCYTES # BLD: 0.5 K/UL (ref 0–1)
MONOCYTES NFR BLD: 11 % (ref 5–13)
NEUTS SEG # BLD: 3.1 K/UL (ref 1.8–8)
NEUTS SEG NFR BLD: 61 % (ref 32–75)
NRBC # BLD: 0 K/UL (ref 0–0.01)
NRBC BLD-RTO: 0 PER 100 WBC
PLATELET # BLD AUTO: 278 K/UL (ref 150–400)
PMV BLD AUTO: 10.6 FL (ref 8.9–12.9)
POTASSIUM SERPL-SCNC: 3.7 MMOL/L (ref 3.5–5.1)
PROT SERPL-MCNC: 7.4 G/DL (ref 6.4–8.2)
RBC # BLD AUTO: 4.86 M/UL (ref 4.1–5.7)
SAMPLES BEING HELD,HOLD: NORMAL
SODIUM SERPL-SCNC: 135 MMOL/L (ref 136–145)
TROPONIN I SERPL-MCNC: <0.05 NG/ML
WBC # BLD AUTO: 5 K/UL (ref 4.1–11.1)

## 2021-09-03 PROCEDURE — 80053 COMPREHEN METABOLIC PANEL: CPT

## 2021-09-03 PROCEDURE — 93005 ELECTROCARDIOGRAM TRACING: CPT

## 2021-09-03 PROCEDURE — 70450 CT HEAD/BRAIN W/O DYE: CPT

## 2021-09-03 PROCEDURE — 36415 COLL VENOUS BLD VENIPUNCTURE: CPT

## 2021-09-03 PROCEDURE — 85025 COMPLETE CBC W/AUTO DIFF WBC: CPT

## 2021-09-03 PROCEDURE — 99285 EMERGENCY DEPT VISIT HI MDM: CPT

## 2021-09-03 PROCEDURE — 82077 ASSAY SPEC XCP UR&BREATH IA: CPT

## 2021-09-03 PROCEDURE — 96360 HYDRATION IV INFUSION INIT: CPT

## 2021-09-03 PROCEDURE — 74011250636 HC RX REV CODE- 250/636: Performed by: EMERGENCY MEDICINE

## 2021-09-03 PROCEDURE — 84484 ASSAY OF TROPONIN QUANT: CPT

## 2021-09-03 RX ADMIN — SODIUM CHLORIDE 1000 ML: 9 INJECTION, SOLUTION INTRAVENOUS at 16:36

## 2021-09-03 NOTE — ED NOTES
1550: Patient placed on monitor x 3, SR x 2, call light in reach    1618:  Doctor at bedside    [phone]: Patient taken to CT at this time    1840: Doctor at bedside

## 2021-09-03 NOTE — ED PROVIDER NOTES
EMERGENCY DEPARTMENT HISTORY AND PHYSICAL EXAM      Date: 9/3/2021  Patient Name: Cornell Duncan    Please note that this dictation was completed with [employer], the computer voice recognition software. Quite often unanticipated grammatical, syntax, homophones, and other interpretive errors are inadvertently transcribed by the computer software. Please disregard these errors. Please excuse any errors that have escaped final proofreading. History of Presenting Illness     Chief Complaint   Patient presents with    Fatigue     Pt arrives via EMS with CC of generalized weakness for and uknown amount of time. Patient lives at home alone, his son called to check on him today and called EMS because patient reported he was unable to get up from his chair at home. Patient's son reports last speaking to him 3-4 days ago. History Provided By: Patient     HPI: Cornell Duncan, 78 y.o. male, with a past medical history significant for diabetes, alcohol use presenting the emergency department with generalized weakness. He lives at home by himself. Son called to check on him and the patient states that he was unable to get out of a chair and was felt very weak. Has not checked on his father in a few days per report by nursing. Son not immediately available for questioning. Patient states that he just feels generally weak. Denies any chest pain or shortness of breath. Reports that he walks with a Rollator and has chronic weakness in bilateral lower extremities. Denies any urinary symptoms, denies any medication or melena. No other exacerbating relieving factors or associated symptoms at this time    PCP: Atiya Lowery MD    No current facility-administered medications on file prior to encounter. Current Outpatient Medications on File Prior to Encounter   Medication Sig Dispense Refill    pioglitazone (ACTOS) 15 mg tablet Take 30 mg by mouth daily.       multivitamin (ONE A DAY) tablet Take 1 Tab by mouth daily.      aspirin 81 mg chewable tablet Take 1 Tab by mouth daily. 30 Tab 0    lisinopril (PRINIVIL, ZESTRIL) 40 mg tablet       metoprolol tartrate (LOPRESSOR) 50 mg tablet Take 50 mg by mouth two (2) times a day.  glimepiride (AMARYL) 2 mg tablet Take 2 mg by mouth two (2) times a day.  diphenhydrAMINE (BENADRYL) 25 mg capsule Take 25 mg by mouth every six (6) hours as needed for Sleep.  simvastatin (ZOCOR) 40 mg tablet Take 40 mg by mouth nightly.  pantoprazole (PROTONIX) 40 mg tablet Take 1 Tab by mouth daily. 30 Tab 0       Past History     Past Medical History:  Past Medical History:   Diagnosis Date    Aneurysm ([facility] [address]     aortic    Arrhythmia     A-fib    Calculus of kidney     Cancer ([facility] [address]     Chronic kidney disease     stone    Diabetes ([facility] [address]     Fast heart beat     Former tobacco use     Hypertension     Muscle weakness     Prostate disorder     Stomach pain        Past Surgical History:  Past Surgical History:   Procedure Laterality Date    HX ORTHOPAEDIC      neck surgery    HX OTHER SURGICAL  1/6/2015    Whipple procedure, G tube, J tube for duodenal cancer    LA ABDOMEN SURGERY PROC UNLISTED      Whipple Procedure    UPPER GI ENDOSCOPY,BALL DIL,30MM  12/2/2014         UPPER GI ENDOSCOPY,BIOPSY  11/26/2014         UPPER GI ENDOSCOPY,BIOPSY  12/2/2014         UPPER GI ENDOSCOPY,DIAGNOSIS  1/23/2015            Family History:  Family History   Problem Relation Age of Onset    Cancer Mother     Heart Disease Mother     Stroke Father     Cancer Brother     Diabetes Brother     Heart Disease Brother        Social History:  Social History     Tobacco Use    Smoking status: Former Smoker     Packs/day: 0.00     Years: 60.00     Pack years: 0.00    Smokeless tobacco: Never Used    Tobacco comment: pipe and cigar use currently   Substance Use Topics    Alcohol use:  Yes     Alcohol/week: 0.0 standard drinks    Drug use: No       Allergies:  No Known Allergies      Review of Systems   Review of Systems   Constitutional: Positive for fatigue. Negative for chills and fever. HENT: Negative for congestion and sore throat. Eyes: Negative for visual disturbance. Respiratory: Negative for cough and shortness of breath. Cardiovascular: Negative for chest pain and leg swelling. Gastrointestinal: Negative for abdominal pain, blood in stool, diarrhea, nausea and vomiting. Endocrine: Negative for polyuria. Genitourinary: Negative for dysuria and testicular pain. Musculoskeletal: Negative for arthralgias, joint swelling and myalgias. Skin: Negative for rash. Allergic/Immunologic: Negative for immunocompromised state. Neurological: Positive for weakness. Negative for headaches. Hematological: Does not bruise/bleed easily. Psychiatric/Behavioral: Negative for confusion. Physical Exam   Physical Exam  Vitals and nursing note reviewed. Constitutional:       Appearance: He is well-developed. HENT:      Head: Normocephalic and atraumatic. Eyes:      General:         Right eye: No discharge. Left eye: No discharge. Conjunctiva/sclera: Conjunctivae normal.      Pupils: Pupils are equal, round, and reactive to light. Neck:      Trachea: No tracheal deviation. Cardiovascular:      Rate and Rhythm: Normal rate and regular rhythm. Heart sounds: Normal heart sounds. No murmur heard. Pulmonary:      Effort: Pulmonary effort is normal. No respiratory distress. Breath sounds: Normal breath sounds. No wheezing or rales. Abdominal:      General: Bowel sounds are normal.      Palpations: Abdomen is soft. Tenderness: There is no abdominal tenderness. There is no guarding or rebound. Musculoskeletal:         General: No tenderness or deformity. Normal range of motion. Cervical back: Normal range of motion and neck supple. Skin:     General: Skin is warm and dry. Findings: No erythema or rash. Neurological:      Mental Status: He is alert and oriented to person, place, and time. Comments: Speech is mildly dysarthric, no facial droop, no weakness in the upper extremities. Patient has equal, but decreased strength in the bilateral lower extremities which she states is chronic. Psychiatric:         Behavior: Behavior normal.         Diagnostic Study Results     Labs -     Recent Results (from the past 12 hour(s))   CBC WITH AUTOMATED DIFF    Collection Time: 09/03/21  3:55 PM   Result Value Ref Range    WBC 5.0 4.1 - 11.1 K/uL    RBC 4.86 4.10 - 5.70 M/uL    HGB 15.4 12.1 - 17.0 g/dL    HCT 45.6 36.6 - 50.3 %    MCV 93.8 80.0 - 99.0 FL    MCH 31.7 26.0 - 34.0 PG    MCHC 33.8 30.0 - 36.5 g/dL    RDW 12.9 11.5 - 14.5 %    PLATELET 585 545 - 833 K/uL    MPV 10.6 8.9 - 12.9 FL    NRBC 0.0 0  WBC    ABSOLUTE NRBC 0.00 0.00 - 0.01 K/uL    NEUTROPHILS 61 32 - 75 %    LYMPHOCYTES 24 12 - 49 %    MONOCYTES 11 5 - 13 %    EOSINOPHILS 3 0 - 7 %    BASOPHILS 1 0 - 1 %    IMMATURE GRANULOCYTES 0 0.0 - 0.5 %    ABS. NEUTROPHILS 3.1 1.8 - 8.0 K/UL    ABS. LYMPHOCYTES 1.2 0.8 - 3.5 K/UL    ABS. MONOCYTES 0.5 0.0 - 1.0 K/UL    ABS. EOSINOPHILS 0.1 0.0 - 0.4 K/UL    ABS. BASOPHILS 0.0 0.0 - 0.1 K/UL    ABS. IMM. GRANS. 0.0 0.00 - 0.04 K/UL    DF AUTOMATED     METABOLIC PANEL, COMPREHENSIVE    Collection Time: 09/03/21  3:55 PM   Result Value Ref Range    Sodium 135 (L) 136 - 145 mmol/L    Potassium 3.7 3.5 - 5.1 mmol/L    Chloride 102 97 - 108 mmol/L    CO2 26 21 - 32 mmol/L    Anion gap 7 5 - 15 mmol/L    Glucose 255 (H) 65 - 100 mg/dL    BUN 18 6 - 20 MG/DL    Creatinine 1.25 0.70 - 1.30 MG/DL    BUN/Creatinine ratio 14 12 - 20      GFR est AA >60 >60 ml/min/1.73m2    GFR est non-AA 55 (L) >60 ml/min/1.73m2    Calcium 8.8 8.5 - 10.1 MG/DL    Bilirubin, total 0.5 0.2 - 1.0 MG/DL    ALT (SGPT) 15 12 - 78 U/L    AST (SGOT) 11 (L) 15 - 37 U/L    Alk.  phosphatase 77 45 - 117 U/L    Protein, total 7.4 6.4 - 8.2 g/dL    Albumin 3.2 (L) 3.5 - 5.0 g/dL    Globulin 4.2 (H) 2.0 - 4.0 g/dL    A-G Ratio 0.8 (L) 1.1 - 2.2     SAMPLES BEING HELD    Collection Time: 09/03/21  3:55 PM   Result Value Ref Range    SAMPLES BEING HELD TEO,SST,RD     COMMENT        Add-on orders for these samples will be processed based on acceptable specimen integrity and analyte stability, which may vary by analyte. TROPONIN I    Collection Time: 09/03/21  3:55 PM   Result Value Ref Range    Troponin-I, Qt. <0.05 <0.05 ng/mL   ETHYL ALCOHOL    Collection Time: 09/03/21  4:37 PM   Result Value Ref Range    ALCOHOL(ETHYL),SERUM <10 <10 MG/DL   EKG, 12 LEAD, INITIAL    Collection Time: 09/03/21  4:43 PM   Result Value Ref Range    Ventricular Rate 70 BPM    Atrial Rate 70 BPM    P-R Interval 256 ms    QRS Duration 92 ms    Q-T Interval 452 ms    QTC Calculation (Bezet) 488 ms    Calculated P Axis 80 degrees    Calculated R Axis 46 degrees    Calculated T Axis 72 degrees    Diagnosis       Sinus rhythm with 1st degree AV block  Nonspecific T wave abnormality  Prolonged QT  When compared with ECG of 02-JUL-2020 21:20,  Nonspecific T wave abnormality no longer evident in Inferior leads  Nonspecific T wave abnormality has replaced inverted T waves in Anterior   leads         Radiologic Studies -   CT HEAD WO CONT   Final Result      1. No acute intracranial abnormality. 2. Stable microvascular ischemic, old ischemic and age-related change. 3. Stable pituitary mass with suprasellar extension. CT Results  (Last 48 hours)               09/03/21 1745  CT HEAD WO CONT Final result    Impression:      1. No acute intracranial abnormality. 2. Stable microvascular ischemic, old ischemic and age-related change. 3. Stable pituitary mass with suprasellar extension. Narrative:  EXAM: CT HEAD WO CONT       INDICATION: confusion, slurred speech       COMPARISON: None. CONTRAST: None.        TECHNIQUE: Unenhanced CT of the head was performed using 5 mm images. Brain and   bone windows were generated. Coronal and sagittal reformats. CT dose reduction   was achieved through use of a standardized protocol tailored for this   examination and automatic exposure control for dose modulation. FINDINGS:   Generalized enlargement of cerebral sulci and ventricles is stable, with   ventricular enlargement without of proportion to cerebral sulcal prominence. .   Periventricular white matter low-density is moderately severe but stable. 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 . There is no intracranial   hemorrhage, extra-axial collection, or mass effect. The basilar cisterns are   open. No CT evidence of acute infarct. Stable pituitary mass with suprasellar   extension. The bone windows demonstrate no acute abnormalities. The visualized portions of   the paranasal sinuses and mastoid air cells are clear. CXR Results  (Last 48 hours)    None            Medical Decision Making   I am the first provider for this patient. I reviewed the vital signs, available nursing notes, past medical history, past surgical history, family history and social history. Vital Signs-Reviewed the patient's vital signs. Patient Vitals for the past 12 hrs:   Temp Pulse Resp BP SpO2   09/03/21 1700  68 17 137/77 96 %   09/03/21 1630  77 17 133/80 96 %   09/03/21 1615  79 13 (!) 146/76 97 %   09/03/21 1553     97 %   09/03/21 1553 98.5 °F (36.9 °C) 83  124/73 97 %       EKG interpretation: (Preliminary)  EKG shows sinus rhythm first-degree AV block. Rate 70. CA interval 256. Records Reviewed:   Nursing notes, Prior visits     Provider Notes (Medical Decision Making):   Patient here with generalized weakness. He has mildly slurred speech, he does seem like he may be intoxicated.   Differential diagnosis also includes stroke, but there is no other lateralizing deficit. He does appear mildly dry. Will give some IV fluids, check basic labs, alcohol level. .      ED Course:   Initial assessment performed. The patients presenting problems have been discussed, and they are in agreement with the care plan formulated and outlined with them. I have encouraged them to ask questions as they arise throughout their visit. ED Course as of Sep 03 1948   Fri Sep 03, 2021   1576 Speech sounds better. Labs reassuring. Ct imaging unremarkable.     [AR]      ED Course User Index  [AR] Rae Valdez, DO       No concern for stroke at this time. Speech is clear and fluent, patient looks well and nontoxic. He is sitting up in bed and eating. Critical Care Time:   none    Disposition:    DISCHARGE NOTE  Patients results have been reviewed with them. Patient and/or family have verbally conveyed their understanding and agreement of the patient's signs, symptoms, diagnosis, treatment and prognosis and additionally agree to follow up as recommended or return to the Emergency Room should their condition change or have any new concerns prior to their follow-up appointment. Patient verbally agrees with the care-plan and verbally conveys that all of their questions have been answered. Discharge instructions have also been provided to the patient with some educational information regarding their diagnosis as well a list of reasons why they would want to return to the ER prior to their follow-up appointment should their condition change. PLAN:  1. Discharge Medication List as of 9/3/2021  6:40 PM        2.    Follow-up Information     Follow up With Specialties Details Why Contact Info    Jeanie Tellez MD Family Medicine Schedule an appointment as soon as possible for a visit   [address] [place] [address]  [address] [address] [phone]      \[facility]\"" EMERGENCY DEPT Emergency Medicine  If symptoms worsen [address] [facility] [address]   [address] [address]  [phone] Return to ED if worse     Diagnosis     Clinical Impression:   1. Weakness        Attestations:   This note was completed by Danelle Schilder, DO

## 2021-09-04 LAB
ATRIAL RATE: 70 BPM
CALCULATED P AXIS, ECG09: 73 DEGREES
CALCULATED R AXIS, ECG10: 48 DEGREES
CALCULATED T AXIS, ECG11: 80 DEGREES
DIAGNOSIS, 93000: NORMAL
P-R INTERVAL, ECG05: 256 MS
Q-T INTERVAL, ECG07: 436 MS
QRS DURATION, ECG06: 90 MS
QTC CALCULATION (BEZET), ECG08: 470 MS
VENTRICULAR RATE, ECG03: 70 BPM

## 2021-09-08 ENCOUNTER — TRANSCRIBE ORDER (OUTPATIENT)
Dept: SCHEDULING | Age: 81
End: 2021-09-08

## 2021-09-08 DIAGNOSIS — R51.9 HEAD ACHE: ICD-10-CM

## 2021-09-08 DIAGNOSIS — R47.1 DYSARTHROSIS: ICD-10-CM

## 2021-09-08 DIAGNOSIS — R53.1 WEAKNESS GENERALIZED: ICD-10-CM

## 2021-09-08 DIAGNOSIS — R41.82 ALTERED MENTAL STATUS: Primary | ICD-10-CM

## 2021-10-21 ENCOUNTER — HOSPITAL ENCOUNTER (OUTPATIENT)
Dept: MRI IMAGING | Age: 81
Discharge: HOME | End: 2021-10-21
Attending: FAMILY MEDICINE
Payer: MEDICARE

## 2021-10-21 VITALS
BODY MASS INDEX: 31.1 KG/M2 | HEIGHT: 69 IN | WEIGHT: 210 LBS | SYSTOLIC BLOOD PRESSURE: 130 MMHG | HEART RATE: 58 BPM | RESPIRATION RATE: 16 BRPM | DIASTOLIC BLOOD PRESSURE: 75 MMHG | TEMPERATURE: 98 F | OXYGEN SATURATION: 100 %

## 2021-10-21 DIAGNOSIS — R51.9 HEAD ACHE: ICD-10-CM

## 2021-10-21 DIAGNOSIS — R41.82 ALTERED MENTAL STATUS: ICD-10-CM

## 2021-10-21 DIAGNOSIS — R53.1 WEAKNESS GENERALIZED: ICD-10-CM

## 2021-10-21 DIAGNOSIS — R47.1 DYSARTHROSIS: ICD-10-CM

## 2021-10-21 PROCEDURE — 70553 MRI BRAIN STEM W/O & W/DYE: CPT

## 2021-10-21 PROCEDURE — 74011250636 HC RX REV CODE- 250/636: Performed by: NURSE PRACTITIONER

## 2021-10-21 PROCEDURE — A9576 INJ PROHANCE MULTIPACK: HCPCS | Performed by: RADIOLOGY

## 2021-10-21 PROCEDURE — 74011250636 HC RX REV CODE- 250/636: Performed by: RADIOLOGY

## 2021-10-21 PROCEDURE — 2709999900 HC NON-CHARGEABLE SUPPLY

## 2021-10-21 RX ORDER — FENTANYL CITRATE 50 UG/ML
25-100 INJECTION, SOLUTION INTRAMUSCULAR; INTRAVENOUS
Status: DISCONTINUED | OUTPATIENT
Start: 2021-10-21 | End: 2021-10-21

## 2021-10-21 RX ORDER — MIDAZOLAM HYDROCHLORIDE 1 MG/ML
5 INJECTION, SOLUTION INTRAMUSCULAR; INTRAVENOUS
Status: DISCONTINUED | OUTPATIENT
Start: 2021-10-21 | End: 2021-10-21

## 2021-10-21 RX ORDER — SODIUM CHLORIDE 9 MG/ML
25 INJECTION, SOLUTION INTRAVENOUS CONTINUOUS
Status: DISCONTINUED | OUTPATIENT
Start: 2021-10-21 | End: 2021-10-21

## 2021-10-21 RX ADMIN — GADOTERIDOL 20 ML: 279.3 INJECTION, SOLUTION INTRAVENOUS at 14:13

## 2021-10-21 RX ADMIN — MIDAZOLAM 2 MG: 1 INJECTION INTRAMUSCULAR; INTRAVENOUS at 13:45

## 2021-10-21 RX ADMIN — FENTANYL CITRATE 50 MCG: 50 INJECTION INTRAMUSCULAR; INTRAVENOUS at 13:51

## 2021-10-21 NOTE — DISCHARGE INSTRUCTIONS
[address] [facility]  Radiology Department  [phone]    Radiologist: Osiel Hendricks NP    Date: 1021/2021        MRI With Sedation Discharge Instructions      Go home and rest and restrict your activity the next 24 hours. You have been given sedating medications, so do not drive or drink alcohol today. Resume your previous diet and medications. You may return to work and resume normal activities tomorrow. Results of your MRI will be sent to your physician as soon as they become available.

## 2021-10-21 NOTE — H&P
INTERVENTIONAL RADIOLOGY  Preoperative History and Physical      Patient:  Sylvain Pereyra  :  1940  Age:  78 y.o. MRN:  [id number]  Today's Date:  10/21/2021      CC / HPI   Sylvain Pereyra is a 78 y.o. male with a history of altered mental status, dysarthria, weakness and headache who presents for brain MRI with sedation 2/2 claustophobia. PAST MEDICAL HISTORY  Past Medical History:   Diagnosis Date    Aneurysm ([address]     aortic    Arrhythmia     A-fib    Calculus of kidney     Cancer ([facility] [address]     Chronic kidney disease     stone    Diabetes ([facility] [address]     Fast heart beat     Former tobacco use     Hypertension     Muscle weakness     Prostate disorder     Stomach pain      PAST SURGICAL HISTORY  Past Surgical History:   Procedure Laterality Date    HX ORTHOPAEDIC      neck surgery    HX OTHER SURGICAL  2015    Whipple procedure, G tube, J tube for duodenal cancer    AR ABDOMEN SURGERY PROC UNLISTED      Whipple Procedure    UPPER GI ENDOSCOPY,BALL DIL,30MM  2014         UPPER GI ENDOSCOPY,BIOPSY  2014         UPPER GI ENDOSCOPY,BIOPSY  2014         UPPER GI ENDOSCOPY,DIAGNOSIS  2015          SOCIAL HISTORY  Social History     Socioeconomic History    Marital status:      Spouse name: Not on file    Number of children: Not on file    Years of education: Not on file    Highest education level: Not on file   Occupational History    Not on file   Tobacco Use    Smoking status: Former Smoker     Packs/day: 0.00     Years: 60.00     Pack years: 0.00    Smokeless tobacco: Never Used    Tobacco comment: pipe and cigar use currently   Substance and Sexual Activity    Alcohol use:  Yes     Alcohol/week: 0.0 standard drinks    Drug use: No    Sexual activity: Not on file   Other Topics Concern    Not on file   Social History Narrative    Not on file     Social Determinants of Health     Financial Resource Strain:     Difficulty of Paying Living Expenses: Food Insecurity:     Worried About Running Out of Food in the Last Year:     [address] in the Last Year:    Transportation Needs:     Lack of Transportation (Medical):  Lack of Transportation (Non-Medical):    Physical Activity:     Days of Exercise per Week:     Minutes of Exercise per Session:    Stress:     Feeling of Stress :    Social Connections:     Frequency of Communication with Friends and Family:     Frequency of Social Gatherings with Friends and Family:     Attends [employer] Services:     Active Member of Clubs or Organizations:     Attends Club or Organization Meetings:     Marital Status:    Intimate Partner Violence:     Fear of Current or Ex-Partner:     Emotionally Abused:     Physically Abused:     Sexually Abused:      FAMILY HISTORY  Family History   Problem Relation Age of Onset    Cancer Mother     Heart Disease Mother     Stroke Father     Cancer Brother     Diabetes Brother     Heart Disease Brother      CURRENT MEDICATIONS  Current Outpatient Medications   Medication Sig Dispense Refill    pioglitazone (ACTOS) 15 mg tablet Take 30 mg by mouth daily.  multivitamin (ONE A DAY) tablet Take 1 Tab by mouth daily.  aspirin 81 mg chewable tablet Take 1 Tab by mouth daily. 30 Tab 0    lisinopril (PRINIVIL, ZESTRIL) 40 mg tablet       metoprolol tartrate (LOPRESSOR) 50 mg tablet Take 50 mg by mouth two (2) times a day.  glimepiride (AMARYL) 2 mg tablet Take 2 mg by mouth two (2) times a day.  diphenhydrAMINE (BENADRYL) 25 mg capsule Take 25 mg by mouth every six (6) hours as needed for Sleep.  simvastatin (ZOCOR) 40 mg tablet Take 40 mg by mouth nightly.  pantoprazole (PROTONIX) 40 mg tablet Take 1 Tab by mouth daily.  30 Tab 0     Current Facility-Administered Medications   Medication Dose Route Frequency Provider Last Rate Last Admin    fentaNYL citrate (PF) injection  mcg   mcg IntraVENous Rad Multiple [address] Ra Valiente NP        midazolam (VERSED) injection 5 mg  5 mg IntraVENous Multiple Vedia Ormond., NP        0.9% sodium chloride infusion  25 mL/hr IntraVENous CONTINUOUS Vedia Ormond., DIANE         ALLERGIES  No Known Allergies    DIAGNOSTIC STUDIES   IMAGING STUDIES  N/A    LABS  Lab Results   Component Value Date/Time    WBC 5.0 09/03/2021 03:55 PM    HGB 15.4 09/03/2021 03:55 PM    HCT 45.6 09/03/2021 03:55 PM    PLATELET 525 59/66/6376 03:55 PM    MCV 93.8 09/03/2021 03:55 PM     Lab Results   Component Value Date/Time    Sodium 135 (L) 09/03/2021 03:55 PM    Potassium 3.7 09/03/2021 03:55 PM    Chloride 102 09/03/2021 03:55 PM    CO2 26 09/03/2021 03:55 PM    Anion gap 7 09/03/2021 03:55 PM    Glucose 255 (H) 09/03/2021 03:55 PM    BUN 18 09/03/2021 03:55 PM    Creatinine 1.25 09/03/2021 03:55 PM    BUN/Creatinine ratio 14 09/03/2021 03:55 PM    GFR est AA >60 09/03/2021 03:55 PM    GFR est non-AA 55 (L) 09/03/2021 03:55 PM    Calcium 8.8 09/03/2021 03:55 PM     Lab Results   Component Value Date/Time    INR 1.1 03/10/2019 01:38 PM    Prothrombin time 10.8 03/10/2019 01:38 PM     PHYSICAL EXAM   BP (!) 157/65 (BP 1 Location: Left arm, BP Patient Position: At rest)   Pulse 60   Temp 98 °F (36.7 °C)   Resp 16   Ht 5' 9\" (1.753 m)   Wt 95.3 kg (210 lb)   SpO2 99%   BMI 31.01 kg/m²   General:  NAD  Heart:  RRR  Lungs:  NWOB  Neurological:  Alert, conversant    PLAN   Procedure to be performed:  MRI brain with sedation  Plan for sedation:  moderate  Post procedure plan:  observation per protocol  Informed consent:  risks, benefits, and alternatives reviewed with the patient / family who agree to proceed      Roberto Javed NP  [facility]

## 2021-10-21 NOTE — PROGRESS NOTES
Pt arrives to IR department accompanied by son via wheelchair. Pt is presently disoriented to time but son, Thony Meyer, at bedside is POA. Pt is resting comfortably in stretcher awaiting consent at this time.

## 2021-10-21 NOTE — PROGRESS NOTES
Name of procedure: MRI sedation    Sedation medications given: 2 mg Versed, 50 mcg Fentanyl    Sedation tolerated: Yes    Total Sedation time: 35 minutes     Vital Signs: VSS    Fluids removed: No    Samples sent to lab: No    Any complications related to procedure:  No

## 2022-03-18 PROBLEM — R50.9 FEVER: Status: ACTIVE | Noted: 2017-09-19

## 2022-03-19 PROBLEM — R41.82 ALTERED MENTAL STATUS: Status: ACTIVE | Noted: 2018-06-12

## 2022-09-09 NOTE — ROUTINE PROCESS
Bedside and Verbal shift change report given to Brittney Carlson (oncoming nurse) by Dallas Saunders (offgoing nurse). Report included the following information SBAR, Kardex, Intake/Output and MAR. Zone Phone:   [phone]      Significant changes during shift:  New admit        Patient Information    Tashia Valdes  [de-identified] y.o.  6/11/2018 10:09 PM by Ines Diggs DO. 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    Diabetes mellitus ([facility] 02/06/2015    Hyponatremia 11/26/2014     Past Medical History:   Diagnosis Date    Aneurysm ([facility]     aortic    Arrhythmia     A-fib    Calculus of kidney     Cancer ([facility]     Chronic kidney disease     stone    Diabetes ([facility]     Fast heart beat     Former tobacco use     Hypertension     Muscle weakness     Prostate disorder     Stomach pain          Core Measures:    CVA: Yes Yes    Activity Status:    OOB to Chair No  Ambulated this shift Yes   Bed Rest No        DVT prophylaxis:    DVT prophylaxis Med- Yes  DVT prophylaxis SCD or PEDRO- No       Patient Safety:    Falls Score Total Score: 2  Safety Level_______  Bed Alarm On? No  Sitter?  No    Plan for upcoming shift: safety, nuero checks, lab        Discharge Plan: Yes when stable    Active Consults:  IP CONSULT TO HOSPITALIST  IP CONSULT TO NEUROLOGY
Bedside and Verbal shift change report given to Sintia Zuleta RN (oncoming nurse) by Leah Saleh (offgoing nurse). Report included the following information SBAR, Kardex, Intake/Output and MAR.     Zone Phone:   [phone]        Significant changes during shift:  None           Patient Information     Meeta Sender  [de-identified] y.o.  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    Diabetes mellitus ([facility] 02/06/2015    Hyponatremia 11/26/2014           Past Medical History:   Diagnosis Date    Aneurysm ([address]       aortic    Arrhythmia       A-fib    Calculus of kidney      Cancer (HCC)      Chronic kidney disease       stone    Diabetes ([address]      Fast heart beat      Former tobacco use      Hypertension      Muscle weakness      Prostate disorder      Stomach pain              Core Measures:     CVA: Yes      Activity Status:     OOB to Chair No  Ambulated this shift Yes   Bed Rest No           DVT prophylaxis:     DVT prophylaxis Med- Yes  DVT prophylaxis SCD or PEDRO- No         Patient Safety:     Falls Score Total Score: 2  Safety Level_______  Bed Alarm On? No  Sitter?  No     Plan for upcoming shift: safety, nuero checks, lab           Discharge Plan: Yes when stable     Active Consults:  IP CONSULT TO HOSPITALIST  IP CONSULT TO NEUROLOGY
Dr Crews Parent and Dr Sebas Patino,     Can you please call Jeaneth Medina (son) at [phone].
I have personally seen and examined the patient. I have collaborated with and supervised the

## 2022-09-29 NOTE — PROGRESS NOTES
Pharmacy Clarification of the Prior to Admission Medication Regimen Retrospective to the Admission Medication Reconciliation    The patient was interviewed regarding clarification of the prior to admission medication regimen. He was questioned regarding use of any other inhalers, topical products, over the counter medications, herbal medications, vitamin products or ophthalmic/nasal/otic medication use. I spoke with his nurse, Abigail Reina, before I went in to interview the pt. Per nurse, pt is very hard of hearing, with the right ear worse than the left, but pt is knowledgeable about his medications. I was advised to speak slowly, loudly and clearly. Contact precautions were also taken (VRE in urine). Information Obtained From: Patient    Recommendations/Findings: The following amendments were made to the patient's active medication list on file at [facility]:     1) Additions: None    2) Removals: Pt stated he is not taking any of these and RxQuery confirmed that the majority of these medication hadn't been filled in at least a year.    Diphenoxylate/atropine (LOMOTIL) 2.5-0.025mg tablet - Take 1 tab by mouth 4 times daily as needed for diarrhea   Docusate Sodium (COLACE) 100mg capsule - Take 100 mg by mouth nightly   Meloxicam (MOBIC) 7.5mg tablet - Take 1 tablet by mouth daily   Ondansetron (ZOFRAN) 4mg tablet - Take 4 mg by mouth every 8 hours as needed for nausea   Oxycodone-Acetaminophen (PERCOCET) 5-325mg tablet - Take 1 tab by mouth every 4 hours as needed   Trimethoprim-sulfamethoxazole (BACTRIM DS) 160-800mg tablet - Take 1 tab by mouth 2 times daily    3) Changes:   Aspirin 81 mg once daily (Old regimen: chewable tablet /New regimen: oral tablet, delayed release)   Diphenhydramine (BENADRYL) 25mg capsule (Old regimen: take 25mg by mouth every 6 hours as needed for itching /New regimen: Take 25mg by mouth once at bedtime as needed for sleep) - pt says he takes this occasionally to help \"keep my nose open at night and it helps me sleep sometimes. \"   Metoprolol 50mg tablet (Old regimen: 1 tab per J-tube route 2 times a day /New regimen: 1 tab orally 2 times a day)     PTA medication list was corrected to the following:     Prior to Admission Medications   Prescriptions Last Dose Informant Patient Reported? Taking?   acetaminophen (TYLENOL) 325 mg tablet 8/21/2017 at Unknown time Self No Yes   Sig: Take 2 Tabs by mouth every four (4) hours as needed. aspirin delayed-release 81 mg tablet 9/18/2017 at Unknown Self Yes Yes   Sig: Take 81 mg by mouth daily. diphenhydrAMINE (BENADRYL) 25 mg capsule 8/21/2017 at Unknown time Self Yes Yes   Sig: Take 25 mg by mouth every six (6) hours as needed for Sleep.   glimepiride (AMARYL) 2 mg tablet 9/18/2017 at Unknown time Self Yes Yes   Sig: Take 2 mg by mouth every morning. lisinopril (PRINIVIL, ZESTRIL) 20 mg tablet 9/18/2017 at Unknown time Self Yes Yes   Sig: Take 20 mg by mouth daily. metoprolol tartrate (LOPRESSOR) 50 mg tablet 9/18/2017 at Unknown  Yes Yes   Sig: Take 50 mg by mouth two (2) times a day. pantoprazole (PROTONIX) 40 mg tablet 9/18/2017 at Unknown Self No Yes   Sig: Take 1 Tab by mouth daily. simvastatin (ZOCOR) 40 mg tablet Unknown at Unknown Self Yes No   Sig: Take 40 mg by mouth nightly. therapeutic multivitamin [facility]) tablet   Yes Yes   Sig: Take 1 Tab by mouth daily as needed.       Facility-Administered Medications: None          Thank you,  Duncan Cerrato Yes

## 2023-05-10 RX ORDER — GLIMEPIRIDE 2 MG/1
TABLET ORAL 2 TIMES DAILY
COMMUNITY

## 2023-05-10 RX ORDER — SIMVASTATIN 40 MG
40 TABLET ORAL NIGHTLY
COMMUNITY
Start: 2015-03-30

## 2023-05-10 RX ORDER — ASPIRIN 81 MG/1
TABLET, CHEWABLE ORAL DAILY
COMMUNITY
Start: 2018-06-14

## 2023-05-10 RX ORDER — PIOGLITAZONEHYDROCHLORIDE 15 MG/1
30 TABLET ORAL DAILY
COMMUNITY

## 2023-05-10 RX ORDER — PANTOPRAZOLE SODIUM 40 MG/1
TABLET, DELAYED RELEASE ORAL DAILY
COMMUNITY
Start: 2015-02-09

## 2023-05-10 RX ORDER — METOPROLOL TARTRATE 50 MG/1
TABLET, FILM COATED ORAL 2 TIMES DAILY
COMMUNITY

## 2023-05-10 RX ORDER — LISINOPRIL 40 MG/1
TABLET ORAL
COMMUNITY
Start: 2018-01-12

## 2023-05-10 RX ORDER — DIPHENHYDRAMINE HCL 25 MG
CAPSULE ORAL EVERY 6 HOURS PRN
COMMUNITY

## 2023-08-20 ENCOUNTER — APPOINTMENT (OUTPATIENT)
Facility: HOSPITAL | Age: 83
End: 2023-08-20

## 2023-08-20 ENCOUNTER — HOSPITAL ENCOUNTER (EMERGENCY)
Facility: HOSPITAL | Age: 83
Discharge: HOME | End: 2023-08-20
Attending: EMERGENCY MEDICINE

## 2023-08-20 VITALS
RESPIRATION RATE: 15 BRPM | TEMPERATURE: 97.9 F | DIASTOLIC BLOOD PRESSURE: 86 MMHG | OXYGEN SATURATION: 97 % | SYSTOLIC BLOOD PRESSURE: 179 MMHG | HEART RATE: 76 BPM

## 2023-08-20 DIAGNOSIS — J81.0 ACUTE PULMONARY EDEMA (HCC): ICD-10-CM

## 2023-08-20 DIAGNOSIS — R07.89 ATYPICAL CHEST PAIN: Primary | ICD-10-CM

## 2023-08-20 LAB
ALBUMIN SERPL-MCNC: 3.3 G/DL (ref 3.5–5)
ALBUMIN/GLOB SERPL: 0.8 (ref 1.1–2.2)
ALP SERPL-CCNC: 96 U/L (ref 45–117)
ALT SERPL-CCNC: 20 U/L (ref 12–78)
ANION GAP SERPL CALC-SCNC: 4 MMOL/L (ref 5–15)
AST SERPL-CCNC: 16 U/L (ref 15–37)
BASOPHILS # BLD: 0.1 K/UL (ref 0–0.1)
BASOPHILS NFR BLD: 1 % (ref 0–1)
BILIRUB SERPL-MCNC: 0.3 MG/DL (ref 0.2–1)
BUN SERPL-MCNC: 26 MG/DL (ref 6–20)
BUN/CREAT SERPL: 17 (ref 12–20)
CALCIUM SERPL-MCNC: 8.8 MG/DL (ref 8.5–10.1)
CHLORIDE SERPL-SCNC: 103 MMOL/L (ref 97–108)
CO2 SERPL-SCNC: 27 MMOL/L (ref 21–32)
CREAT SERPL-MCNC: 1.49 MG/DL (ref 0.7–1.3)
DIFFERENTIAL METHOD BLD: ABNORMAL
EOSINOPHIL # BLD: 0.1 K/UL (ref 0–0.4)
EOSINOPHIL NFR BLD: 2 % (ref 0–7)
ERYTHROCYTE [DISTWIDTH] IN BLOOD BY AUTOMATED COUNT: 13.7 % (ref 11.5–14.5)
GLOBULIN SER CALC-MCNC: 4.4 G/DL (ref 2–4)
GLUCOSE SERPL-MCNC: 112 MG/DL (ref 65–100)
HCT VFR BLD AUTO: 37.1 % (ref 36.6–50.3)
HGB BLD-MCNC: 12.2 G/DL (ref 12.1–17)
IMM GRANULOCYTES # BLD AUTO: 0 K/UL (ref 0–0.04)
IMM GRANULOCYTES NFR BLD AUTO: 0 % (ref 0–0.5)
LYMPHOCYTES # BLD: 2.1 K/UL (ref 0.8–3.5)
LYMPHOCYTES NFR BLD: 39 % (ref 12–49)
MCH RBC QN AUTO: 30.7 PG (ref 26–34)
MCHC RBC AUTO-ENTMCNC: 32.9 G/DL (ref 30–36.5)
MCV RBC AUTO: 93.5 FL (ref 80–99)
MONOCYTES # BLD: 0.6 K/UL (ref 0–1)
MONOCYTES NFR BLD: 11 % (ref 5–13)
NEUTS SEG # BLD: 2.5 K/UL (ref 1.8–8)
NEUTS SEG NFR BLD: 47 % (ref 32–75)
NRBC # BLD: 0 K/UL (ref 0–0.01)
NRBC BLD-RTO: 0 PER 100 WBC
NT PRO BNP: 1852 PG/ML
PLATELET # BLD AUTO: 337 K/UL (ref 150–400)
PMV BLD AUTO: 10.2 FL (ref 8.9–12.9)
POTASSIUM SERPL-SCNC: 4.4 MMOL/L (ref 3.5–5.1)
PROT SERPL-MCNC: 7.7 G/DL (ref 6.4–8.2)
RBC # BLD AUTO: 3.97 M/UL (ref 4.1–5.7)
SODIUM SERPL-SCNC: 134 MMOL/L (ref 136–145)
TROPONIN I SERPL HS-MCNC: 8 NG/L (ref 0–76)
TROPONIN I SERPL HS-MCNC: 8 NG/L (ref 0–76)
WBC # BLD AUTO: 5.5 K/UL (ref 4.1–11.1)

## 2023-08-20 PROCEDURE — 85025 COMPLETE CBC W/AUTO DIFF WBC: CPT

## 2023-08-20 PROCEDURE — 93005 ELECTROCARDIOGRAM TRACING: CPT | Performed by: EMERGENCY MEDICINE

## 2023-08-20 PROCEDURE — 80053 COMPREHEN METABOLIC PANEL: CPT

## 2023-08-20 PROCEDURE — 6370000000 HC RX 637 (ALT 250 FOR IP): Performed by: EMERGENCY MEDICINE

## 2023-08-20 PROCEDURE — 83880 ASSAY OF NATRIURETIC PEPTIDE: CPT

## 2023-08-20 PROCEDURE — 6360000002 HC RX W HCPCS: Performed by: EMERGENCY MEDICINE

## 2023-08-20 PROCEDURE — 84484 ASSAY OF TROPONIN QUANT: CPT

## 2023-08-20 PROCEDURE — 96374 THER/PROPH/DIAG INJ IV PUSH: CPT

## 2023-08-20 PROCEDURE — 71045 X-RAY EXAM CHEST 1 VIEW: CPT

## 2023-08-20 PROCEDURE — 99285 EMERGENCY DEPT VISIT HI MDM: CPT

## 2023-08-20 PROCEDURE — 36415 COLL VENOUS BLD VENIPUNCTURE: CPT

## 2023-08-20 RX ORDER — POTASSIUM CHLORIDE 20 MEQ/1
20 TABLET, EXTENDED RELEASE ORAL DAILY
Qty: 4 TABLET | Refills: 5 | Status: SHIPPED | OUTPATIENT
Start: 2023-08-20 | End: 2023-08-20 | Stop reason: SDUPTHER

## 2023-08-20 RX ORDER — POTASSIUM CHLORIDE 20 MEQ/1
20 TABLET, EXTENDED RELEASE ORAL DAILY
Qty: 4 TABLET | Refills: 0 | Status: SHIPPED | OUTPATIENT
Start: 2023-08-20

## 2023-08-20 RX ORDER — FUROSEMIDE 20 MG/1
20 TABLET ORAL DAILY
Qty: 4 TABLET | Refills: 0 | Status: SHIPPED | OUTPATIENT
Start: 2023-08-20

## 2023-08-20 RX ORDER — ACETAMINOPHEN 500 MG
1000 TABLET ORAL
Status: COMPLETED | OUTPATIENT
Start: 2023-08-20 | End: 2023-08-20

## 2023-08-20 RX ORDER — FUROSEMIDE 10 MG/ML
20 INJECTION INTRAMUSCULAR; INTRAVENOUS ONCE
Status: COMPLETED | OUTPATIENT
Start: 2023-08-20 | End: 2023-08-20

## 2023-08-20 RX ADMIN — ACETAMINOPHEN 1000 MG: 500 TABLET ORAL at 18:15

## 2023-08-20 RX ADMIN — FUROSEMIDE 20 MG: 10 INJECTION, SOLUTION INTRAMUSCULAR; INTRAVENOUS at 17:28

## 2023-08-20 ASSESSMENT — HEART SCORE: ECG: 0

## 2023-08-20 NOTE — ED TRIAGE NOTES
Pt arrives via EMS from [facility] for chest pain. Per EMS, [facility] staff reports that he has been having chest pain since 0930 this morning. Pt denies chest pain and SOB. Only pt complaint is neck pain \"all over\".

## 2023-08-20 NOTE — ED PROVIDER NOTES
[facility] EMERGENCY DEPT  EMERGENCY DEPARTMENT ENCOUNTER       Pt Name: Kj Vega  MRN: [id number]  [address] [facility] 1940  Date of evaluation: 8/20/2023  Provider: Som Mcmillan MD   PCP: Sara Salgado MD  Note Started: 3:52 PM EDT 8/20/23     CHIEF COMPLAINT       Chief Complaint   Patient presents with    Chest Pain     Pt arrives via EMS from [facility] for chest pain since this morning. Pt denies chest pain. HISTORY OF PRESENT ILLNESS: 1 or more elements      History From: patient/ems, History limited by: none     Kj Vega is a 78 y.o. male who presents with concerns for chest pain. Please See MDM for Additional Details of the HPI/PMH  Nursing Notes were all reviewed and agreed with or any disagreements were addressed in the HPI. REVIEW OF SYSTEMS        Positives and Pertinent negatives as per HPI.     PAST HISTORY     Past Medical History:  Past Medical History:   Diagnosis Date    Aneurysm ([address])     aortic    Arrhythmia     A-fib    Calculus of kidney     Cancer ([address])     Chronic kidney disease     stone    Diabetes ([address])     Fast heart beat     Former tobacco use     Hypertension     Muscle weakness     Prostate disorder     Stomach pain        Past Surgical History:  Past Surgical History:   Procedure Laterality Date    ORTHOPEDIC SURGERY      neck surgery    OTHER SURGICAL HISTORY  1/6/2015    Whipple procedure, G tube, J tube for duodenal cancer    ID ABDOMEN SURGERY PROC UNLISTED      Whipple Procedure    UPPER GI ENDOSCOPY,BALL DIL,30MM  12/2/2014         UPPER GI ENDOSCOPY,BIOPSY  12/2/2014         UPPER GI ENDOSCOPY,BIOPSY  11/26/2014         UPPER GI ENDOSCOPY,DIAGNOSIS  1/23/2015            Family History:  Family History   Problem Relation Age of Onset    Diabetes Brother     Cancer Mother     Stroke Father     Cancer Brother     Heart Disease Mother     Heart Disease Brother        Social History:  Social History     Tobacco Use    Smoking status: Former     Packs/day: 0.00 stated that Katia called him and stated the patient had been complaining of chest pain and clutching his chest since this morning. Son also reports the patient has very poor short-term memory which is consistent with his presentation today. He also tells me he is only been at [facility] for 3 weeks. On arrival patient is nontoxic-appearing, hemodynamically stable and has no acute complaints. Given concerns for chest pain will work-up for ACS. Additional differentials include musculoskeletal pain, GERD. Will check basic lab work to rule out severe electrolyte derangements or anemia. Will check troponin and ekg to rule out ACS  Will check chest XR to rule out focal airspace disease/fluid overload/PTX      Amount and/or Complexity of Data Reviewed  Labs: ordered. Decision-making details documented in ED Course. Radiology: ordered. Decision-making details documented in ED Course. ECG/medicine tests: ordered and independent interpretation performed. Decision-making details documented in ED Course. ED Course as of 08/20/23 1554   Sun Aug 20, 2023   1422 EKG performed at 1340 shows accelerated Junctional rhythm, rate of 66, QTc 499, no acute ischemic changes, interpretation [TOMAS]      ED Course User Index  [TOMAS] Bridger Antonio MD     Patient signed out to Dr. Lupe Walker pending CXR and repeat troponin. Anticipate d/c home if these test are unremarkable  Donna Israel MD      FINAL IMPRESSION     1. Atypical chest pain          DISPOSITION/PLAN   Keenanaminata Rodriguez's  results have been reviewed with him. He has been counseled regarding his diagnosis, treatment, and plan. He verbally conveys understanding and agreement of the signs, symptoms, diagnosis, treatment and prognosis and additionally agrees to follow up as discussed. He also agrees with the care-plan and conveys that all of his questions have been answered.   I have also provided discharge instructions for him that include: educational

## 2023-08-20 NOTE — ED NOTES
Followed up with Steven Barges from radiology regarding lack of CXR results. Advised radiology would be over to room shortly to perform imaging.      Grazyna Hood RN  08/20/23 [id number]

## 2023-08-20 NOTE — ED NOTES
Verbal shift change report given to [address] [facility] Buster and Supriya RN (oncoming nurse) by Jenifer Benitez RN  (offgoing nurse). Report included the following information Nurse Handoff Report, ED SBAR, Adult Overview, Intake/Output, MAR, and Recent Results.         Ameya Pearl RN  08/20/23 1807

## 2023-08-21 LAB
EKG ATRIAL RATE: 67 BPM
EKG DIAGNOSIS: NORMAL
EKG Q-T INTERVAL: 476 MS
EKG QRS DURATION: 94 MS
EKG QTC CALCULATION (BAZETT): 499 MS
EKG R AXIS: 42 DEGREES
EKG T AXIS: 52 DEGREES
EKG VENTRICULAR RATE: 66 BPM

## 2023-08-23 ENCOUNTER — HOSPITAL ENCOUNTER (EMERGENCY)
Facility: HOSPITAL | Age: 83
Discharge: HOME | End: 2023-08-23
Attending: STUDENT IN AN ORGANIZED HEALTH CARE EDUCATION/TRAINING PROGRAM

## 2023-08-23 ENCOUNTER — APPOINTMENT (OUTPATIENT)
Facility: HOSPITAL | Age: 83
End: 2023-08-23

## 2023-08-23 VITALS
RESPIRATION RATE: 18 BRPM | OXYGEN SATURATION: 98 % | SYSTOLIC BLOOD PRESSURE: 147 MMHG | HEART RATE: 64 BPM | DIASTOLIC BLOOD PRESSURE: 93 MMHG | TEMPERATURE: 97.7 F | WEIGHT: 217.59 LBS | BODY MASS INDEX: 32.13 KG/M2

## 2023-08-23 DIAGNOSIS — R07.9 CHEST PAIN, UNSPECIFIED TYPE: Primary | ICD-10-CM

## 2023-08-23 LAB
ALBUMIN SERPL-MCNC: 3.2 G/DL (ref 3.5–5)
ALBUMIN/GLOB SERPL: 0.8 (ref 1.1–2.2)
ALP SERPL-CCNC: 97 U/L (ref 45–117)
ALT SERPL-CCNC: 20 U/L (ref 12–78)
ANION GAP SERPL CALC-SCNC: 3 MMOL/L (ref 5–15)
AST SERPL-CCNC: 17 U/L (ref 15–37)
BASOPHILS # BLD: 0.1 K/UL (ref 0–0.1)
BASOPHILS NFR BLD: 1 % (ref 0–1)
BILIRUB SERPL-MCNC: 0.3 MG/DL (ref 0.2–1)
BUN SERPL-MCNC: 25 MG/DL (ref 6–20)
BUN/CREAT SERPL: 16 (ref 12–20)
CALCIUM SERPL-MCNC: 8.7 MG/DL (ref 8.5–10.1)
CHLORIDE SERPL-SCNC: 101 MMOL/L (ref 97–108)
CO2 SERPL-SCNC: 30 MMOL/L (ref 21–32)
CREAT SERPL-MCNC: 1.61 MG/DL (ref 0.7–1.3)
DIFFERENTIAL METHOD BLD: ABNORMAL
EOSINOPHIL # BLD: 0.1 K/UL (ref 0–0.4)
EOSINOPHIL NFR BLD: 2 % (ref 0–7)
ERYTHROCYTE [DISTWIDTH] IN BLOOD BY AUTOMATED COUNT: 13.6 % (ref 11.5–14.5)
GLOBULIN SER CALC-MCNC: 3.9 G/DL (ref 2–4)
GLUCOSE SERPL-MCNC: 140 MG/DL (ref 65–100)
HCT VFR BLD AUTO: 37.7 % (ref 36.6–50.3)
HGB BLD-MCNC: 11.9 G/DL (ref 12.1–17)
IMM GRANULOCYTES # BLD AUTO: 0 K/UL (ref 0–0.04)
IMM GRANULOCYTES NFR BLD AUTO: 0 % (ref 0–0.5)
LYMPHOCYTES # BLD: 1.9 K/UL (ref 0.8–3.5)
LYMPHOCYTES NFR BLD: 39 % (ref 12–49)
MCH RBC QN AUTO: 30 PG (ref 26–34)
MCHC RBC AUTO-ENTMCNC: 31.6 G/DL (ref 30–36.5)
MCV RBC AUTO: 95 FL (ref 80–99)
MONOCYTES # BLD: 0.5 K/UL (ref 0–1)
MONOCYTES NFR BLD: 11 % (ref 5–13)
NEUTS SEG # BLD: 2.3 K/UL (ref 1.8–8)
NEUTS SEG NFR BLD: 47 % (ref 32–75)
NRBC # BLD: 0 K/UL (ref 0–0.01)
NRBC BLD-RTO: 0 PER 100 WBC
PLATELET # BLD AUTO: 326 K/UL (ref 150–400)
PMV BLD AUTO: 10 FL (ref 8.9–12.9)
POTASSIUM SERPL-SCNC: 4.1 MMOL/L (ref 3.5–5.1)
PROT SERPL-MCNC: 7.1 G/DL (ref 6.4–8.2)
RBC # BLD AUTO: 3.97 M/UL (ref 4.1–5.7)
SODIUM SERPL-SCNC: 134 MMOL/L (ref 136–145)
TROPONIN I SERPL HS-MCNC: 8 NG/L (ref 0–76)
TROPONIN I SERPL HS-MCNC: 8 NG/L (ref 0–76)
WBC # BLD AUTO: 4.9 K/UL (ref 4.1–11.1)

## 2023-08-23 PROCEDURE — 36415 COLL VENOUS BLD VENIPUNCTURE: CPT

## 2023-08-23 PROCEDURE — 85025 COMPLETE CBC W/AUTO DIFF WBC: CPT

## 2023-08-23 PROCEDURE — 71045 X-RAY EXAM CHEST 1 VIEW: CPT

## 2023-08-23 PROCEDURE — 84484 ASSAY OF TROPONIN QUANT: CPT

## 2023-08-23 PROCEDURE — 93005 ELECTROCARDIOGRAM TRACING: CPT | Performed by: STUDENT IN AN ORGANIZED HEALTH CARE EDUCATION/TRAINING PROGRAM

## 2023-08-23 PROCEDURE — 99285 EMERGENCY DEPT VISIT HI MDM: CPT

## 2023-08-23 PROCEDURE — 80053 COMPREHEN METABOLIC PANEL: CPT

## 2023-08-23 RX ORDER — NYSTATIN 100000 U/G
OINTMENT TOPICAL
Qty: 30 G | Refills: 0 | Status: SHIPPED | OUTPATIENT
Start: 2023-08-23

## 2023-08-23 ASSESSMENT — PAIN - FUNCTIONAL ASSESSMENT: PAIN_FUNCTIONAL_ASSESSMENT: 0-10

## 2023-08-23 ASSESSMENT — HEART SCORE: ECG: 1

## 2023-08-23 ASSESSMENT — PAIN SCALES - GENERAL: PAINLEVEL_OUTOF10: 0

## 2023-08-23 NOTE — ED PROVIDER NOTES
FINAL IMPRESSION     1. Chest pain, unspecified type          DISPOSITION/PLAN   Ray Keepers  results have been reviewed with him. He has been counseled regarding his diagnosis, treatment, and plan. He verbally conveys understanding and agreement of the signs, symptoms, diagnosis, treatment and prognosis and additionally agrees to follow up as discussed. He also agrees with the care-plan and conveys that all of his questions have been answered. I have also provided discharge instructions for him that include: educational information regarding their diagnosis and treatment, and list of reasons why they would want to return to the ED prior to their follow-up appointment, should his condition change. CLINICAL IMPRESSION    Discharge Note: The patient is stable for discharge home. The signs, symptoms, diagnosis, and discharge instructions have been discussed, understanding conveyed, and agreed upon. The patient is to follow up as recommended or return to ER should their symptoms worsen.       PATIENT REFERRED TO:  Yas Hayden MD  [address] [place] [address]  [phone]    In 1 week      [facility] EMER[facility]  [address] [place] [address] [address] [phone]    If symptoms worsen       DISCHARGE MEDICATIONS:     Medication List        ASK your doctor about these medications      aspirin 81 MG chewable tablet     diphenhydrAMINE 25 MG capsule  Commonly known as: BENADRYL     furosemide 20 MG tablet  Commonly known as: Lasix  Take 1 tablet by mouth daily     glimepiride 2 MG tablet  Commonly known as: AMARYL     lisinopril 40 MG tablet  Commonly known as: PRINIVIL;ZESTRIL     metoprolol tartrate 50 MG tablet  Commonly known as: LOPRESSOR     pantoprazole 40 MG tablet  Commonly known as: PROTONIX     pioglitazone 15 MG tablet  Commonly known as: ACTOS     potassium chloride 20 MEQ extended release tablet  Commonly known as: KLOR-CON M  Take 1 tablet by mouth daily

## 2023-08-23 NOTE — ED NOTES
Discharge medications reviewed with the patient and appropriate educational materials and side effects teaching were provided.        Enrico Unger RN  08/23/23 [id number]

## 2023-08-24 LAB
EKG ATRIAL RATE: 63 BPM
EKG ATRIAL RATE: 70 BPM
EKG DIAGNOSIS: NORMAL
EKG DIAGNOSIS: NORMAL
EKG Q-T INTERVAL: 458 MS
EKG Q-T INTERVAL: 478 MS
EKG QRS DURATION: 76 MS
EKG QRS DURATION: 78 MS
EKG QTC CALCULATION (BAZETT): 472 MS
EKG QTC CALCULATION (BAZETT): 493 MS
EKG R AXIS: 56 DEGREES
EKG R AXIS: 57 DEGREES
EKG T AXIS: 66 DEGREES
EKG T AXIS: 66 DEGREES
EKG VENTRICULAR RATE: 64 BPM
EKG VENTRICULAR RATE: 64 BPM

## 2023-12-14 PROBLEM — R65.20 SEVERE SEPSIS (HCC): Status: ACTIVE | Noted: 2023-12-14

## 2023-12-14 PROBLEM — A41.9 SEVERE SEPSIS (HCC): Status: ACTIVE | Noted: 2023-12-14

## 2024-01-30 ENCOUNTER — OFFICE VISIT (OUTPATIENT)
Facility: CLINIC | Age: 84
End: 2024-01-30
Payer: MEDICARE

## 2024-01-30 DIAGNOSIS — I48.91 ATRIAL FIBRILLATION, UNSPECIFIED TYPE (HCC): ICD-10-CM

## 2024-01-30 DIAGNOSIS — F03.C11 SEVERE DEMENTIA WITH AGITATION, UNSPECIFIED DEMENTIA TYPE (HCC): Primary | ICD-10-CM

## 2024-01-30 DIAGNOSIS — I10 HYPERTENSION, UNSPECIFIED TYPE: ICD-10-CM

## 2024-01-30 DIAGNOSIS — E11.9 TYPE 2 DIABETES MELLITUS WITHOUT COMPLICATION, WITHOUT LONG-TERM CURRENT USE OF INSULIN (HCC): ICD-10-CM

## 2024-01-30 DIAGNOSIS — K21.9 GASTROESOPHAGEAL REFLUX DISEASE WITHOUT ESOPHAGITIS: ICD-10-CM

## 2024-01-30 DIAGNOSIS — M19.90 OSTEOARTHRITIS, UNSPECIFIED OSTEOARTHRITIS TYPE, UNSPECIFIED SITE: ICD-10-CM

## 2024-01-30 DIAGNOSIS — E78.2 MIXED HYPERLIPIDEMIA: ICD-10-CM

## 2024-01-30 DIAGNOSIS — H10.9 BACTERIAL CONJUNCTIVITIS: ICD-10-CM

## 2024-01-30 PROCEDURE — 99316 NF DSCHRG MGMT 30 MIN+: CPT | Performed by: NURSE PRACTITIONER

## 2024-01-30 PROCEDURE — G8484 FLU IMMUNIZE NO ADMIN: HCPCS | Performed by: NURSE PRACTITIONER

## 2024-01-30 RX ORDER — LISINOPRIL 10 MG/1
10 TABLET ORAL DAILY
Qty: 30 TABLET | Refills: 1 | Status: SHIPPED | OUTPATIENT
Start: 2024-01-30

## 2024-01-30 RX ORDER — LISINOPRIL 10 MG/1
10 TABLET ORAL DAILY
COMMUNITY
End: 2024-01-30 | Stop reason: SDUPTHER

## 2024-01-30 RX ORDER — ASPIRIN 81 MG/1
81 TABLET, CHEWABLE ORAL DAILY
Qty: 30 TABLET | Refills: 1 | Status: SHIPPED | OUTPATIENT
Start: 2024-01-30 | End: 2024-03-30

## 2024-01-30 RX ORDER — OMEPRAZOLE 20 MG/1
20 CAPSULE, DELAYED RELEASE ORAL DAILY
COMMUNITY
End: 2024-01-30 | Stop reason: SDUPTHER

## 2024-01-30 RX ORDER — PIOGLITAZONEHYDROCHLORIDE 15 MG/1
30 TABLET ORAL DAILY
Qty: 60 TABLET | Refills: 1 | Status: SHIPPED | OUTPATIENT
Start: 2024-01-30

## 2024-01-30 RX ORDER — CIPROFLOXACIN HYDROCHLORIDE 3.5 MG/ML
2 SOLUTION/ DROPS TOPICAL 2 TIMES DAILY
Qty: 2.5 ML | Refills: 0 | Status: SHIPPED | OUTPATIENT
Start: 2024-01-30 | End: 2024-02-01

## 2024-01-30 RX ORDER — SIMVASTATIN 40 MG
40 TABLET ORAL NIGHTLY
Qty: 30 TABLET | Refills: 1 | Status: SHIPPED | OUTPATIENT
Start: 2024-01-30

## 2024-01-30 RX ORDER — CIPROFLOXACIN HYDROCHLORIDE 3.5 MG/ML
2 SOLUTION/ DROPS TOPICAL 2 TIMES DAILY
COMMUNITY
End: 2024-01-30 | Stop reason: SDUPTHER

## 2024-01-30 RX ORDER — OMEPRAZOLE 20 MG/1
20 CAPSULE, DELAYED RELEASE ORAL DAILY
Qty: 30 CAPSULE | Refills: 1 | Status: SHIPPED | OUTPATIENT
Start: 2024-01-30 | End: 2024-03-30

## 2024-01-30 RX ORDER — ACETAMINOPHEN 500 MG
1000 TABLET ORAL 2 TIMES DAILY
Qty: 120 TABLET | Refills: 1 | Status: SHIPPED | OUTPATIENT
Start: 2024-01-30 | End: 2024-03-30

## 2024-01-30 RX ORDER — ACETAMINOPHEN 500 MG
1000 TABLET ORAL 2 TIMES DAILY
COMMUNITY
End: 2024-01-30 | Stop reason: SDUPTHER

## 2024-01-30 NOTE — PROGRESS NOTES
Place of Service:  [facility] [address] [place] [address]                                                                     Discharge Summary       Admit Dx: Altered mental status, worsening dementia, possible sepsis, acute on chronic renal failure    Disposition: Assisted Living    Presentation:  83-year-old male with history of diabetes, hypertension, atrial fibrillation chronic  kidney disease stage III who came via EMS after [facility]. Patient was found to be sitting in his  urine and feces and had dry mucous membranes with tachycardia and fever on  arrival to ED. Patient had relatively unremarkable blood work and CT of the head,CT  of the abdomen pelvis imaging in the ED except possibility of UTI on urinalysis .  patient was also found to have elevated creatinine of 2.3. He was admitted to hospital  with possible sepsis and acute on chronic renal failure. Rapid flu and COVID test  were negative. UA questionable positive with blood. Lactic acid was 1.36  procalcitonin 1.51 WBC count was 14.6 with left shift. LFTs were normal. Blood  cultures and urine cultures were negative. CT of the abdomen pelvis showed status  post Whipple's with no acute abnormality noted. Chest x-ray was negative for acute  change. He was given 5 doses of ceftriaxone. He was also started on IV fluids.  Kidney function improved with IV hydration. White cell count continued to trend  downwards. Blood cultures remain negative. After discharge from hospital he is now  admitted to [facility] for skilled rehab.      Discharge time : < 30 mins    Brief SNF Course:  This is an 83 y.o. male during stay patient did acquire COVID-19 where he was treated also required dexamethasone.  Ultimately he did have chest x-ray that showed pneumonia with effusion he completed 7-day course of Levaquin and 7-day course of dexamethasone.

## 2024-02-15 ENCOUNTER — HOSPITAL ENCOUNTER (EMERGENCY)
Facility: HOSPITAL | Age: 84
Discharge: HOME | End: 2024-02-15
Attending: EMERGENCY MEDICINE
Payer: MEDICARE

## 2024-02-15 ENCOUNTER — APPOINTMENT (OUTPATIENT)
Facility: HOSPITAL | Age: 84
End: 2024-02-15
Payer: MEDICARE

## 2024-02-15 VITALS
WEIGHT: 212 LBS | HEART RATE: 73 BPM | TEMPERATURE: 98 F | DIASTOLIC BLOOD PRESSURE: 64 MMHG | SYSTOLIC BLOOD PRESSURE: 106 MMHG | RESPIRATION RATE: 15 BRPM | BODY MASS INDEX: 30.42 KG/M2 | OXYGEN SATURATION: 95 %

## 2024-02-15 DIAGNOSIS — S22.41XA: ICD-10-CM

## 2024-02-15 DIAGNOSIS — K57.92 ACUTE DIVERTICULITIS: Primary | ICD-10-CM

## 2024-02-15 LAB
ALBUMIN SERPL-MCNC: 2.5 G/DL (ref 3.5–5)
ALBUMIN/GLOB SERPL: 0.7 (ref 1.1–2.2)
ALP SERPL-CCNC: 117 U/L (ref 45–117)
ALT SERPL-CCNC: 14 U/L (ref 12–78)
ANION GAP SERPL CALC-SCNC: 7 MMOL/L (ref 5–15)
AST SERPL-CCNC: 13 U/L (ref 15–37)
BASE DEFICIT BLDV-SCNC: 3.6 MMOL/L
BASOPHILS # BLD: 0.1 K/UL (ref 0–0.1)
BASOPHILS NFR BLD: 1 % (ref 0–1)
BILIRUB SERPL-MCNC: 0.3 MG/DL (ref 0.2–1)
BUN SERPL-MCNC: 15 MG/DL (ref 6–20)
BUN/CREAT SERPL: 14 (ref 12–20)
CALCIUM SERPL-MCNC: 7.8 MG/DL (ref 8.5–10.1)
CHLORIDE SERPL-SCNC: 112 MMOL/L (ref 97–108)
CO2 SERPL-SCNC: 23 MMOL/L (ref 21–32)
COMMENT:: NORMAL
CREAT SERPL-MCNC: 1.09 MG/DL (ref 0.7–1.3)
DIFFERENTIAL METHOD BLD: ABNORMAL
EOSINOPHIL # BLD: 0.2 K/UL (ref 0–0.4)
EOSINOPHIL NFR BLD: 3 % (ref 0–7)
ERYTHROCYTE [DISTWIDTH] IN BLOOD BY AUTOMATED COUNT: 15.9 % (ref 11.5–14.5)
GLOBULIN SER CALC-MCNC: 3.4 G/DL (ref 2–4)
GLUCOSE SERPL-MCNC: 131 MG/DL (ref 65–100)
HCO3 BLDV-SCNC: 22.7 MMOL/L (ref 23–28)
HCT VFR BLD AUTO: 37.4 % (ref 36.6–50.3)
HGB BLD-MCNC: 11.6 G/DL (ref 12.1–17)
IMM GRANULOCYTES # BLD AUTO: 0 K/UL (ref 0–0.04)
IMM GRANULOCYTES NFR BLD AUTO: 0 % (ref 0–0.5)
LIPASE SERPL-CCNC: 21 U/L (ref 13–75)
LYMPHOCYTES # BLD: 2.1 K/UL (ref 0.8–3.5)
LYMPHOCYTES NFR BLD: 34 % (ref 12–49)
MCH RBC QN AUTO: 29.7 PG (ref 26–34)
MCHC RBC AUTO-ENTMCNC: 31 G/DL (ref 30–36.5)
MCV RBC AUTO: 95.7 FL (ref 80–99)
MONOCYTES # BLD: 0.6 K/UL (ref 0–1)
MONOCYTES NFR BLD: 9 % (ref 5–13)
NEUTS SEG # BLD: 3.3 K/UL (ref 1.8–8)
NEUTS SEG NFR BLD: 53 % (ref 32–75)
NRBC # BLD: 0 K/UL (ref 0–0.01)
NRBC BLD-RTO: 0 PER 100 WBC
PCO2 BLDV: 45 MMHG (ref 41–51)
PH BLDV: 7.31 (ref 7.32–7.42)
PLATELET # BLD AUTO: 337 K/UL (ref 150–400)
PMV BLD AUTO: 9.7 FL (ref 8.9–12.9)
PO2 BLDV: <27 MMHG (ref 25–40)
POTASSIUM SERPL-SCNC: 3.6 MMOL/L (ref 3.5–5.1)
PROT SERPL-MCNC: 5.9 G/DL (ref 6.4–8.2)
RBC # BLD AUTO: 3.91 M/UL (ref 4.1–5.7)
SERVICE CMNT-IMP: ABNORMAL
SODIUM SERPL-SCNC: 142 MMOL/L (ref 136–145)
SPECIMEN HOLD: NORMAL
SPECIMEN TYPE: ABNORMAL
TROPONIN I SERPL HS-MCNC: 10 NG/L (ref 0–76)
WBC # BLD AUTO: 6.1 K/UL (ref 4.1–11.1)

## 2024-02-15 PROCEDURE — 82803 BLOOD GASES ANY COMBINATION: CPT

## 2024-02-15 PROCEDURE — 80053 COMPREHEN METABOLIC PANEL: CPT

## 2024-02-15 PROCEDURE — 99285 EMERGENCY DEPT VISIT HI MDM: CPT

## 2024-02-15 PROCEDURE — 6370000000 HC RX 637 (ALT 250 FOR IP): Performed by: EMERGENCY MEDICINE

## 2024-02-15 PROCEDURE — 83690 ASSAY OF LIPASE: CPT

## 2024-02-15 PROCEDURE — 74177 CT ABD & PELVIS W/CONTRAST: CPT

## 2024-02-15 PROCEDURE — 71101 X-RAY EXAM UNILAT RIBS/CHEST: CPT

## 2024-02-15 PROCEDURE — 96375 TX/PRO/DX INJ NEW DRUG ADDON: CPT

## 2024-02-15 PROCEDURE — 96374 THER/PROPH/DIAG INJ IV PUSH: CPT

## 2024-02-15 PROCEDURE — 2580000003 HC RX 258: Performed by: EMERGENCY MEDICINE

## 2024-02-15 PROCEDURE — 85025 COMPLETE CBC W/AUTO DIFF WBC: CPT

## 2024-02-15 PROCEDURE — 84484 ASSAY OF TROPONIN QUANT: CPT

## 2024-02-15 PROCEDURE — 6360000004 HC RX CONTRAST MEDICATION: Performed by: EMERGENCY MEDICINE

## 2024-02-15 PROCEDURE — 36415 COLL VENOUS BLD VENIPUNCTURE: CPT

## 2024-02-15 PROCEDURE — 6360000002 HC RX W HCPCS: Performed by: EMERGENCY MEDICINE

## 2024-02-15 RX ORDER — 0.9 % SODIUM CHLORIDE 0.9 %
1000 INTRAVENOUS SOLUTION INTRAVENOUS ONCE
Status: COMPLETED | OUTPATIENT
Start: 2024-02-15 | End: 2024-02-15

## 2024-02-15 RX ORDER — METRONIDAZOLE 250 MG/1
500 TABLET ORAL
Status: COMPLETED | OUTPATIENT
Start: 2024-02-15 | End: 2024-02-15

## 2024-02-15 RX ORDER — CIPROFLOXACIN 500 MG/1
500 TABLET, FILM COATED ORAL 2 TIMES DAILY
Qty: 20 TABLET | Refills: 0 | Status: SHIPPED | OUTPATIENT
Start: 2024-02-15 | End: 2024-02-25

## 2024-02-15 RX ORDER — METRONIDAZOLE 500 MG/1
500 TABLET ORAL 3 TIMES DAILY
Qty: 30 TABLET | Refills: 0 | Status: SHIPPED | OUTPATIENT
Start: 2024-02-15 | End: 2024-02-25

## 2024-02-15 RX ORDER — TRAMADOL HYDROCHLORIDE 50 MG/1
50 TABLET ORAL
Status: COMPLETED | OUTPATIENT
Start: 2024-02-15 | End: 2024-02-15

## 2024-02-15 RX ORDER — ONDANSETRON 2 MG/ML
4 INJECTION INTRAMUSCULAR; INTRAVENOUS ONCE
Status: COMPLETED | OUTPATIENT
Start: 2024-02-15 | End: 2024-02-15

## 2024-02-15 RX ORDER — TRAMADOL HYDROCHLORIDE 50 MG/1
50 TABLET ORAL EVERY 6 HOURS PRN
Qty: 12 TABLET | Refills: 0 | Status: SHIPPED | OUTPATIENT
Start: 2024-02-15 | End: 2024-02-18

## 2024-02-15 RX ORDER — CIPROFLOXACIN 500 MG/1
500 TABLET, FILM COATED ORAL
Status: COMPLETED | OUTPATIENT
Start: 2024-02-15 | End: 2024-02-15

## 2024-02-15 RX ORDER — FENTANYL CITRATE 50 UG/ML
25 INJECTION, SOLUTION INTRAMUSCULAR; INTRAVENOUS
Status: COMPLETED | OUTPATIENT
Start: 2024-02-15 | End: 2024-02-15

## 2024-02-15 RX ORDER — METHOCARBAMOL 750 MG/1
750 TABLET, FILM COATED ORAL
Status: COMPLETED | OUTPATIENT
Start: 2024-02-15 | End: 2024-02-15

## 2024-02-15 RX ORDER — LIDOCAINE 4 G/G
1 PATCH TOPICAL
Status: DISCONTINUED | OUTPATIENT
Start: 2024-02-15 | End: 2024-02-15 | Stop reason: HOSPADM

## 2024-02-15 RX ADMIN — ONDANSETRON 4 MG: 2 INJECTION INTRAMUSCULAR; INTRAVENOUS at 11:50

## 2024-02-15 RX ADMIN — FENTANYL CITRATE 25 MCG: 50 INJECTION INTRAMUSCULAR; INTRAVENOUS at 11:49

## 2024-02-15 RX ADMIN — TRAMADOL HYDROCHLORIDE 50 MG: 50 TABLET ORAL at 14:54

## 2024-02-15 RX ADMIN — CIPROFLOXACIN HYDROCHLORIDE 500 MG: 500 TABLET, FILM COATED ORAL at 13:22

## 2024-02-15 RX ADMIN — METRONIDAZOLE 500 MG: 250 TABLET ORAL at 13:22

## 2024-02-15 RX ADMIN — SODIUM CHLORIDE 1000 ML: 9 INJECTION, SOLUTION INTRAVENOUS at 13:23

## 2024-02-15 RX ADMIN — IOPAMIDOL 100 ML: 755 INJECTION, SOLUTION INTRAVENOUS at 11:33

## 2024-02-15 RX ADMIN — METHOCARBAMOL TABLETS 750 MG: 750 TABLET, COATED ORAL at 13:27

## 2024-02-15 NOTE — ED NOTES
RN reviewed discharge instructions with the patient.  The patient verbalized understanding.  Wheelchair on discharge.

## 2024-02-15 NOTE — ED NOTES
1500  Medicated for pain as ordered.  Assisted pt to void 250cc.  Brief remains clean and dry.  Pt turned s/s, positioned up in bed by 2 RN's.  Warm blanket provided.  Plan for discharge at 1600 with transport

## 2024-02-15 NOTE — ED TRIAGE NOTES
Pt arrived [facility] via EMS for c/o right upper abdominal right chest wall pain. Pt has hx of Dementia and irritable on arrival due to pain. None ambulatory wheelchair bound.

## 2024-02-15 NOTE — ED PROVIDER NOTES
back: Normal range of motion.   Skin:     General: Skin is warm.   Neurological:      General: No focal deficit present.      Mental Status: He is alert.      Comments: Alert and oriented times 2+, patient knows the year but not the month   Psychiatric:         Mood and Affect: Mood normal.          DIAGNOSTIC RESULTS   LABS:     Recent Results (from the past 24 hour(s))   CBC with Diff    Collection Time: 02/15/24 10:56 AM   Result Value Ref Range    WBC 6.1 4.1 - 11.1 K/uL    RBC 3.91 (L) 4.10 - 5.70 M/uL    Hemoglobin 11.6 (L) 12.1 - 17.0 g/dL    Hematocrit 37.4 36.6 - 50.3 %    MCV 95.7 80.0 - 99.0 FL    MCH 29.7 26.0 - 34.0 PG    MCHC 31.0 30.0 - 36.5 g/dL    RDW 15.9 (H) 11.5 - 14.5 %    Platelets 337 150 - 400 K/uL    MPV 9.7 8.9 - 12.9 FL    Nucleated RBCs 0.0 0  WBC    nRBC 0.00 0.00 - 0.01 K/uL    Neutrophils % 53 32 - 75 %    Lymphocytes % 34 12 - 49 %    Monocytes % 9 5 - 13 %    Eosinophils % 3 0 - 7 %    Basophils % 1 0 - 1 %    Immature Granulocytes 0 0.0 - 0.5 %    Neutrophils Absolute 3.3 1.8 - 8.0 K/UL    Lymphocytes Absolute 2.1 0.8 - 3.5 K/UL    Monocytes Absolute 0.6 0.0 - 1.0 K/UL    Eosinophils Absolute 0.2 0.0 - 0.4 K/UL    Basophils Absolute 0.1 0.0 - 0.1 K/UL    Absolute Immature Granulocyte 0.0 0.00 - 0.04 K/UL    Differential Type AUTOMATED     CMP    Collection Time: 02/15/24 10:56 AM   Result Value Ref Range    Sodium 142 136 - 145 mmol/L    Potassium 3.6 3.5 - 5.1 mmol/L    Chloride 112 (H) 97 - 108 mmol/L    CO2 23 21 - 32 mmol/L    Anion Gap 7 5 - 15 mmol/L    Glucose 131 (H) 65 - 100 mg/dL    BUN 15 6 - 20 MG/DL    Creatinine 1.09 0.70 - 1.30 MG/DL    Bun/Cre Ratio 14 12 - 20      Est, Glom Filt Rate >60 >60 ml/min/1.73m2    Calcium 7.8 (L) 8.5 - 10.1 MG/DL    Total Bilirubin 0.3 0.2 - 1.0 MG/DL    ALT 14 12 - 78 U/L    AST 13 (L) 15 - 37 U/L    Alk Phosphatase 117 45 - 117 U/L    Total Protein 5.9 (L) 6.4 - 8.2 g/dL    Albumin 2.5 (L) 3.5 - 5.0 g/dL    Globulin 3.4 2.0 - 4.0

## 2024-02-15 NOTE — ED NOTES
Pt states he has not had a drink of alcohol in over 3 weeks.also states he woke up with 10/10 right rib pain states it feels like someone is tearing his ribs outs. Anytime pt moves arms he c/o of sharp pain. Pt can't recall time or situation due to baseline dementia.

## 2024-02-20 ENCOUNTER — APPOINTMENT (OUTPATIENT)
Facility: HOSPITAL | Age: 84
End: 2024-02-20
Payer: MEDICARE

## 2024-02-20 ENCOUNTER — HOSPITAL ENCOUNTER (INPATIENT)
Facility: HOSPITAL | Age: 84
LOS: 3 days | Discharge: OTHER INSTITUTION | End: 2024-02-23
Attending: STUDENT IN AN ORGANIZED HEALTH CARE EDUCATION/TRAINING PROGRAM | Admitting: INTERNAL MEDICINE
Payer: MEDICARE

## 2024-02-20 DIAGNOSIS — R55 SYNCOPE AND COLLAPSE: Primary | ICD-10-CM

## 2024-02-20 DIAGNOSIS — I26.93 SINGLE SUBSEGMENTAL PULMONARY EMBOLISM WITHOUT ACUTE COR PULMONALE (HCC): ICD-10-CM

## 2024-02-20 DIAGNOSIS — I49.9 CARDIAC ARRHYTHMIA, UNSPECIFIED CARDIAC ARRHYTHMIA TYPE: ICD-10-CM

## 2024-02-20 LAB
ALBUMIN SERPL-MCNC: 2.3 G/DL (ref 3.5–5)
ALBUMIN/GLOB SERPL: 0.8 (ref 1.1–2.2)
ALP SERPL-CCNC: 100 U/L (ref 45–117)
ALT SERPL-CCNC: 10 U/L (ref 12–78)
ANION GAP BLD CALC-SCNC: 7 (ref 10–20)
ANION GAP SERPL CALC-SCNC: 6 MMOL/L (ref 5–15)
APPEARANCE UR: CLEAR
AST SERPL-CCNC: 14 U/L (ref 15–37)
BACTERIA URNS QL MICRO: NEGATIVE /HPF
BASE DEFICIT BLD-SCNC: 2.5 MMOL/L
BASOPHILS # BLD: 0.1 K/UL (ref 0–0.1)
BASOPHILS NFR BLD: 2 % (ref 0–1)
BILIRUB SERPL-MCNC: 0.3 MG/DL (ref 0.2–1)
BILIRUB UR QL CFM: NEGATIVE
BUN SERPL-MCNC: 13 MG/DL (ref 6–20)
BUN/CREAT SERPL: 10 (ref 12–20)
CA-I BLD-MCNC: 1.16 MMOL/L (ref 1.12–1.32)
CALCIUM SERPL-MCNC: 8 MG/DL (ref 8.5–10.1)
CHLORIDE BLD-SCNC: 110 MMOL/L (ref 100–108)
CHLORIDE SERPL-SCNC: 112 MMOL/L (ref 97–108)
CO2 BLD-SCNC: 22 MMOL/L (ref 19–24)
CO2 SERPL-SCNC: 24 MMOL/L (ref 21–32)
COLOR UR: ABNORMAL
CREAT SERPL-MCNC: 1.28 MG/DL (ref 0.7–1.3)
CREAT UR-MCNC: 1 MG/DL (ref 0.6–1.3)
D DIMER PPP FEU-MCNC: 1.52 MG/L FEU (ref 0–0.65)
DIFFERENTIAL METHOD BLD: ABNORMAL
EOSINOPHIL # BLD: 0.3 K/UL (ref 0–0.4)
EOSINOPHIL NFR BLD: 6 % (ref 0–7)
EPITH CASTS URNS QL MICRO: ABNORMAL /LPF
ERYTHROCYTE [DISTWIDTH] IN BLOOD BY AUTOMATED COUNT: 16.2 % (ref 11.5–14.5)
GLOBULIN SER CALC-MCNC: 2.9 G/DL (ref 2–4)
GLUCOSE BLD STRIP.AUTO-MCNC: 108 MG/DL (ref 74–106)
GLUCOSE BLD STRIP.AUTO-MCNC: 120 MG/DL (ref 65–117)
GLUCOSE SERPL-MCNC: 168 MG/DL (ref 65–100)
GLUCOSE UR STRIP.AUTO-MCNC: NEGATIVE MG/DL
HCO3 BLDA-SCNC: 22 MMOL/L
HCT VFR BLD AUTO: 34.4 % (ref 36.6–50.3)
HGB BLD-MCNC: 10.7 G/DL (ref 12.1–17)
HGB UR QL STRIP: NEGATIVE
HYALINE CASTS URNS QL MICRO: ABNORMAL /LPF (ref 0–2)
IMM GRANULOCYTES # BLD AUTO: 0 K/UL (ref 0–0.04)
IMM GRANULOCYTES NFR BLD AUTO: 0 % (ref 0–0.5)
KETONES UR QL STRIP.AUTO: ABNORMAL MG/DL
LACTATE BLD-SCNC: 0.68 MMOL/L (ref 0.4–2)
LEUKOCYTE ESTERASE UR QL STRIP.AUTO: ABNORMAL
LYMPHOCYTES # BLD: 2 K/UL (ref 0.8–3.5)
LYMPHOCYTES NFR BLD: 44 % (ref 12–49)
MAGNESIUM SERPL-MCNC: 1.7 MG/DL (ref 1.6–2.4)
MCH RBC QN AUTO: 29.3 PG (ref 26–34)
MCHC RBC AUTO-ENTMCNC: 31.1 G/DL (ref 30–36.5)
MCV RBC AUTO: 94.2 FL (ref 80–99)
MONOCYTES # BLD: 0.4 K/UL (ref 0–1)
MONOCYTES NFR BLD: 10 % (ref 5–13)
NEUTS SEG # BLD: 1.7 K/UL (ref 1.8–8)
NEUTS SEG NFR BLD: 38 % (ref 32–75)
NITRITE UR QL STRIP.AUTO: NEGATIVE
NRBC # BLD: 0 K/UL (ref 0–0.01)
NRBC BLD-RTO: 0 PER 100 WBC
PCO2 BLDV: 37.4 MMHG (ref 41–51)
PH BLDV: 7.38 (ref 7.32–7.42)
PH UR STRIP: 5 (ref 5–8)
PLATELET # BLD AUTO: 267 K/UL (ref 150–400)
PMV BLD AUTO: 9.9 FL (ref 8.9–12.9)
PO2 BLDV: 34 MMHG (ref 25–40)
POTASSIUM BLD-SCNC: 3.8 MMOL/L (ref 3.5–5.5)
POTASSIUM SERPL-SCNC: 4.1 MMOL/L (ref 3.5–5.1)
PROCALCITONIN SERPL-MCNC: <0.05 NG/ML
PROT SERPL-MCNC: 5.2 G/DL (ref 6.4–8.2)
PROT UR STRIP-MCNC: ABNORMAL MG/DL
RBC # BLD AUTO: 3.65 M/UL (ref 4.1–5.7)
RBC #/AREA URNS HPF: ABNORMAL /HPF (ref 0–5)
SAO2 % BLD: 64 %
SERVICE CMNT-IMP: ABNORMAL
SODIUM BLD-SCNC: 139 MMOL/L (ref 136–145)
SODIUM SERPL-SCNC: 142 MMOL/L (ref 136–145)
SP GR UR REFRACTOMETRY: 1.03
SPECIMEN SITE: ABNORMAL
TROPONIN I SERPL HS-MCNC: 7 NG/L (ref 0–76)
URINE CULTURE IF INDICATED: ABNORMAL
UROBILINOGEN UR QL STRIP.AUTO: 0.2 EU/DL (ref 0.2–1)
WBC # BLD AUTO: 4.5 K/UL (ref 4.1–11.1)
WBC URNS QL MICRO: ABNORMAL /HPF (ref 0–4)

## 2024-02-20 PROCEDURE — 82962 GLUCOSE BLOOD TEST: CPT

## 2024-02-20 PROCEDURE — 2580000003 HC RX 258: Performed by: STUDENT IN AN ORGANIZED HEALTH CARE EDUCATION/TRAINING PROGRAM

## 2024-02-20 PROCEDURE — 80053 COMPREHEN METABOLIC PANEL: CPT

## 2024-02-20 PROCEDURE — 6360000002 HC RX W HCPCS: Performed by: INTERNAL MEDICINE

## 2024-02-20 PROCEDURE — 84145 PROCALCITONIN (PCT): CPT

## 2024-02-20 PROCEDURE — 36415 COLL VENOUS BLD VENIPUNCTURE: CPT

## 2024-02-20 PROCEDURE — 82803 BLOOD GASES ANY COMBINATION: CPT

## 2024-02-20 PROCEDURE — 84295 ASSAY OF SERUM SODIUM: CPT

## 2024-02-20 PROCEDURE — 84484 ASSAY OF TROPONIN QUANT: CPT

## 2024-02-20 PROCEDURE — 87040 BLOOD CULTURE FOR BACTERIA: CPT

## 2024-02-20 PROCEDURE — 82947 ASSAY GLUCOSE BLOOD QUANT: CPT

## 2024-02-20 PROCEDURE — 84132 ASSAY OF SERUM POTASSIUM: CPT

## 2024-02-20 PROCEDURE — 6370000000 HC RX 637 (ALT 250 FOR IP): Performed by: INTERNAL MEDICINE

## 2024-02-20 PROCEDURE — 83735 ASSAY OF MAGNESIUM: CPT

## 2024-02-20 PROCEDURE — 2060000000 HC ICU INTERMEDIATE R&B

## 2024-02-20 PROCEDURE — 71045 X-RAY EXAM CHEST 1 VIEW: CPT

## 2024-02-20 PROCEDURE — 2580000003 HC RX 258: Performed by: INTERNAL MEDICINE

## 2024-02-20 PROCEDURE — 82330 ASSAY OF CALCIUM: CPT

## 2024-02-20 PROCEDURE — 93005 ELECTROCARDIOGRAM TRACING: CPT | Performed by: STUDENT IN AN ORGANIZED HEALTH CARE EDUCATION/TRAINING PROGRAM

## 2024-02-20 PROCEDURE — 81001 URINALYSIS AUTO W/SCOPE: CPT

## 2024-02-20 PROCEDURE — 99285 EMERGENCY DEPT VISIT HI MDM: CPT

## 2024-02-20 PROCEDURE — 70450 CT HEAD/BRAIN W/O DYE: CPT

## 2024-02-20 PROCEDURE — 96360 HYDRATION IV INFUSION INIT: CPT

## 2024-02-20 PROCEDURE — 85379 FIBRIN DEGRADATION QUANT: CPT

## 2024-02-20 PROCEDURE — 85025 COMPLETE CBC W/AUTO DIFF WBC: CPT

## 2024-02-20 RX ORDER — ACETAMINOPHEN 325 MG/1
650 TABLET ORAL EVERY 6 HOURS PRN
Status: DISCONTINUED | OUTPATIENT
Start: 2024-02-20 | End: 2024-02-23 | Stop reason: HOSPADM

## 2024-02-20 RX ORDER — ASPIRIN 81 MG/1
81 TABLET, CHEWABLE ORAL DAILY
Status: DISCONTINUED | OUTPATIENT
Start: 2024-02-21 | End: 2024-02-23 | Stop reason: HOSPADM

## 2024-02-20 RX ORDER — SODIUM CHLORIDE, SODIUM LACTATE, POTASSIUM CHLORIDE, AND CALCIUM CHLORIDE .6; .31; .03; .02 G/100ML; G/100ML; G/100ML; G/100ML
1000 INJECTION, SOLUTION INTRAVENOUS ONCE
Status: COMPLETED | OUTPATIENT
Start: 2024-02-20 | End: 2024-02-20

## 2024-02-20 RX ORDER — LEVOFLOXACIN 750 MG/1
750 TABLET, FILM COATED ORAL DAILY
Status: DISCONTINUED | OUTPATIENT
Start: 2024-02-20 | End: 2024-02-23 | Stop reason: HOSPADM

## 2024-02-20 RX ORDER — INSULIN LISPRO 100 [IU]/ML
0-4 INJECTION, SOLUTION INTRAVENOUS; SUBCUTANEOUS NIGHTLY
Status: DISCONTINUED | OUTPATIENT
Start: 2024-02-20 | End: 2024-02-23 | Stop reason: HOSPADM

## 2024-02-20 RX ORDER — ACETAMINOPHEN 500 MG
1000 TABLET ORAL 2 TIMES DAILY
Status: DISCONTINUED | OUTPATIENT
Start: 2024-02-20 | End: 2024-02-23 | Stop reason: HOSPADM

## 2024-02-20 RX ORDER — METRONIDAZOLE 250 MG/1
500 TABLET ORAL 3 TIMES DAILY
Status: DISCONTINUED | OUTPATIENT
Start: 2024-02-20 | End: 2024-02-23 | Stop reason: HOSPADM

## 2024-02-20 RX ORDER — ENOXAPARIN SODIUM 100 MG/ML
40 INJECTION SUBCUTANEOUS DAILY
Status: DISCONTINUED | OUTPATIENT
Start: 2024-02-20 | End: 2024-02-21

## 2024-02-20 RX ORDER — SODIUM CHLORIDE 9 MG/ML
INJECTION, SOLUTION INTRAVENOUS CONTINUOUS
Status: DISCONTINUED | OUTPATIENT
Start: 2024-02-20 | End: 2024-02-22

## 2024-02-20 RX ORDER — ATORVASTATIN CALCIUM 20 MG/1
20 TABLET, FILM COATED ORAL DAILY
Status: DISCONTINUED | OUTPATIENT
Start: 2024-02-20 | End: 2024-02-23 | Stop reason: HOSPADM

## 2024-02-20 RX ORDER — SODIUM CHLORIDE, SODIUM LACTATE, POTASSIUM CHLORIDE, AND CALCIUM CHLORIDE .6; .31; .03; .02 G/100ML; G/100ML; G/100ML; G/100ML
500 INJECTION, SOLUTION INTRAVENOUS
Status: COMPLETED | OUTPATIENT
Start: 2024-02-20 | End: 2024-02-20

## 2024-02-20 RX ORDER — ONDANSETRON 4 MG/1
4 TABLET, ORALLY DISINTEGRATING ORAL EVERY 8 HOURS PRN
Status: DISCONTINUED | OUTPATIENT
Start: 2024-02-20 | End: 2024-02-23 | Stop reason: HOSPADM

## 2024-02-20 RX ORDER — ACETAMINOPHEN 650 MG/1
650 SUPPOSITORY RECTAL EVERY 6 HOURS PRN
Status: DISCONTINUED | OUTPATIENT
Start: 2024-02-20 | End: 2024-02-23 | Stop reason: HOSPADM

## 2024-02-20 RX ORDER — ONDANSETRON 2 MG/ML
4 INJECTION INTRAMUSCULAR; INTRAVENOUS EVERY 6 HOURS PRN
Status: DISCONTINUED | OUTPATIENT
Start: 2024-02-20 | End: 2024-02-23 | Stop reason: HOSPADM

## 2024-02-20 RX ORDER — CIPROFLOXACIN 500 MG/1
500 TABLET, FILM COATED ORAL 2 TIMES DAILY
Status: DISCONTINUED | OUTPATIENT
Start: 2024-02-20 | End: 2024-02-20 | Stop reason: CLARIF

## 2024-02-20 RX ORDER — POLYETHYLENE GLYCOL 3350 17 G/17G
17 POWDER, FOR SOLUTION ORAL DAILY PRN
Status: DISCONTINUED | OUTPATIENT
Start: 2024-02-20 | End: 2024-02-23 | Stop reason: HOSPADM

## 2024-02-20 RX ORDER — PANTOPRAZOLE SODIUM 40 MG/1
40 TABLET, DELAYED RELEASE ORAL
Status: DISCONTINUED | OUTPATIENT
Start: 2024-02-21 | End: 2024-02-23 | Stop reason: HOSPADM

## 2024-02-20 RX ORDER — INSULIN LISPRO 100 [IU]/ML
0-8 INJECTION, SOLUTION INTRAVENOUS; SUBCUTANEOUS
Status: DISCONTINUED | OUTPATIENT
Start: 2024-02-20 | End: 2024-02-23 | Stop reason: HOSPADM

## 2024-02-20 RX ADMIN — METRONIDAZOLE 500 MG: 250 TABLET ORAL at 20:37

## 2024-02-20 RX ADMIN — ACETAMINOPHEN 1000 MG: 500 TABLET ORAL at 20:37

## 2024-02-20 RX ADMIN — LEVOFLOXACIN 750 MG: 750 TABLET, FILM COATED ORAL at 20:00

## 2024-02-20 RX ADMIN — ENOXAPARIN SODIUM 40 MG: 100 INJECTION SUBCUTANEOUS at 20:37

## 2024-02-20 RX ADMIN — SODIUM CHLORIDE: 9 INJECTION, SOLUTION INTRAVENOUS at 19:25

## 2024-02-20 RX ADMIN — ATORVASTATIN CALCIUM 20 MG: 20 TABLET, FILM COATED ORAL at 20:37

## 2024-02-20 RX ADMIN — SODIUM CHLORIDE, POTASSIUM CHLORIDE, SODIUM LACTATE AND CALCIUM CHLORIDE 500 ML: 600; 310; 30; 20 INJECTION, SOLUTION INTRAVENOUS at 14:24

## 2024-02-20 RX ADMIN — SODIUM CHLORIDE, POTASSIUM CHLORIDE, SODIUM LACTATE AND CALCIUM CHLORIDE 1000 ML: 600; 310; 30; 20 INJECTION, SOLUTION INTRAVENOUS at 13:17

## 2024-02-20 ASSESSMENT — PAIN SCALES - GENERAL
PAINLEVEL_OUTOF10: 0
PAINLEVEL_OUTOF10: 0

## 2024-02-20 ASSESSMENT — LIFESTYLE VARIABLES
HOW MANY STANDARD DRINKS CONTAINING ALCOHOL DO YOU HAVE ON A TYPICAL DAY: PATIENT DOES NOT DRINK
HOW OFTEN DO YOU HAVE A DRINK CONTAINING ALCOHOL: NEVER

## 2024-02-20 ASSESSMENT — PAIN - FUNCTIONAL ASSESSMENT: PAIN_FUNCTIONAL_ASSESSMENT: NONE - DENIES PAIN

## 2024-02-20 NOTE — ED PROVIDER NOTES
the patient management.  History: 83yoM presents after syncopal episode  Exam: Appears generally well.  Pleasantly demented.  Not completely oriented but seemingly at baseline.  No complaints.  MDM: 83-year-old male presents after syncope.  He does not remember the whole incident but is able to say that people told him that he look like he fell asleep and was drooling.  He did not fall he did not hit his head.  Notes that he feels generally well right now.  Previously admitted for diverticulitis.  Notably hypertensive upon arrival however no signs of acute organ derangement.  Given IV fluid resuscitation and blood pressure has normalized.  Urinalysis negative for any acute UTIs and lab work is reassuring with some only mild hyperglycemia.  Looks a bit pale but hemoglobin stable at 10.7.  Patient able to walk but BP still a bit low. EKG by my interpretation consistent with afib.  Not on anticoagulation and possibly 2/2 high risk of falls, though patient unsure of this diagnosis.  Will admit for high risk syncope.          Electronically signed by Regino Bateman MD on 2/20/2024 at 4:24 PM     [JS]      ED Course User Index  [JS] Regino Bateman MD          Critical Care Time:     25 minutes     Disposition:  Admis      PLAN:  1.      Medication List        ASK your doctor about these medications      acetaminophen 500 MG tablet  Commonly known as: TYLENOL  Take 2 tablets by mouth in the morning and at bedtime     aspirin 81 MG chewable tablet  Take 1 tablet by mouth daily     ciprofloxacin 500 MG tablet  Commonly known as: Cipro  Take 1 tablet by mouth 2 times daily for 10 days     lisinopril 10 MG tablet  Commonly known as: PRINIVIL;ZESTRIL  Take 1 tablet by mouth daily     metoprolol tartrate 25 MG tablet  Commonly known as: LOPRESSOR  Take 1 tablet by mouth 2 times daily     metroNIDAZOLE 500 MG tablet  Commonly known as: FLAGYL  Take 1 tablet by mouth 3 times daily for 10 days     omeprazole 20 MG delayed  release capsule  Commonly known as: PRILOSEC  Take 1 capsule by mouth daily     pioglitazone 15 MG tablet  Commonly known as: ACTOS  Take 2 tablets by mouth daily     simvastatin 40 MG tablet  Commonly known as: ZOCOR  Take 1 tablet by mouth nightly            2. No follow-up provider specified.  Return to ED if worse     Diagnosis     Clinical Impression: Arrhythmia, syncope and collapse               Regino Bateman MD  02/22/24 0620

## 2024-02-20 NOTE — ED NOTES
0935 - Pt presents to ED via EMS from [facility] and is moved off the stretcher. EMS reports pt was eating when he fell asleep. Upon EMS arrival, pt found drooling on himself and coming around; EMS BP 83/51 and . H/o HTN, diverticulitis, and dementia. Pt is not on a blood thinner.     Pt reports he feels \"sleepy\". Pt baseline A&OX1 and is not ambulatory.    0945 - Provider at bedside.    0950 - X-ray at bedside.

## 2024-02-20 NOTE — ED NOTES
1406  Status update provided to Moises Coffey.  Pt assisted to use urinal.  Voided 400cc dark magdalena urine.  Urine specimens sent.

## 2024-02-21 ENCOUNTER — APPOINTMENT (OUTPATIENT)
Facility: HOSPITAL | Age: 84
End: 2024-02-21
Attending: INTERNAL MEDICINE
Payer: MEDICARE

## 2024-02-21 ENCOUNTER — APPOINTMENT (OUTPATIENT)
Facility: HOSPITAL | Age: 84
End: 2024-02-21
Payer: MEDICARE

## 2024-02-21 LAB
ANION GAP BLD CALC-SCNC: ABNORMAL (ref 10–20)
ANION GAP SERPL CALC-SCNC: 6 MMOL/L (ref 5–15)
BASE DEFICIT BLD-SCNC: 1.6 MMOL/L
BASOPHILS # BLD: 0.1 K/UL (ref 0–0.1)
BASOPHILS NFR BLD: 2 % (ref 0–1)
BUN SERPL-MCNC: 11 MG/DL (ref 6–20)
BUN/CREAT SERPL: 11 (ref 12–20)
CA-I BLD-MCNC: 1.17 MMOL/L (ref 1.12–1.32)
CALCIUM SERPL-MCNC: 7.8 MG/DL (ref 8.5–10.1)
CHLORIDE BLD-SCNC: 110 MMOL/L (ref 100–108)
CHLORIDE SERPL-SCNC: 110 MMOL/L (ref 97–108)
CO2 BLD-SCNC: 23 MMOL/L (ref 19–24)
CO2 SERPL-SCNC: 22 MMOL/L (ref 21–32)
CREAT SERPL-MCNC: 0.97 MG/DL (ref 0.7–1.3)
DIFFERENTIAL METHOD BLD: ABNORMAL
ECHO AO ROOT DIAM: 4.6 CM
ECHO AO ROOT INDEX: 2.18 CM/M2
ECHO AV AREA PEAK VELOCITY: 1.8 CM2
ECHO AV AREA PEAK VELOCITY: 2 CM2
ECHO AV MEAN GRADIENT: 10 MMHG
ECHO AV MEAN VELOCITY: 1.5 M/S
ECHO AV PEAK GRADIENT: 16 MMHG
ECHO AV PEAK GRADIENT: 20 MMHG
ECHO AV PEAK VELOCITY: 2 M/S
ECHO AV PEAK VELOCITY: 2.3 M/S
ECHO AV VTI: 56.8 CM
ECHO BSA: 2.15 M2
ECHO LA DIAMETER INDEX: 2.04 CM/M2
ECHO LA DIAMETER: 4.3 CM
ECHO LA TO AORTIC ROOT RATIO: 0.93
ECHO LV FRACTIONAL SHORTENING: 24 % (ref 28–44)
ECHO LV INTERNAL DIMENSION DIASTOLE INDEX: 1.75 CM/M2
ECHO LV INTERNAL DIMENSION DIASTOLIC: 3.7 CM (ref 4.2–5.9)
ECHO LV INTERNAL DIMENSION SYSTOLIC INDEX: 1.33 CM/M2
ECHO LV INTERNAL DIMENSION SYSTOLIC: 2.8 CM
ECHO LV IVSD: 1.2 CM (ref 0.6–1)
ECHO LV MASS 2D: 156.7 G (ref 88–224)
ECHO LV MASS INDEX 2D: 74.3 G/M2 (ref 49–115)
ECHO LV POSTERIOR WALL DIASTOLIC: 1.3 CM (ref 0.6–1)
ECHO LV RELATIVE WALL THICKNESS RATIO: 0.7
ECHO LVOT AREA: 4.2 CM2
ECHO LVOT DIAM: 2.3 CM
ECHO LVOT PEAK GRADIENT: 3 MMHG
ECHO LVOT PEAK VELOCITY: 0.9 M/S
ECHO PV MAX VELOCITY: 0.7 M/S
ECHO PV PEAK GRADIENT: 2 MMHG
ECHO RV FREE WALL PEAK S': 9 CM/S
EKG ATRIAL RATE: 241 BPM
EKG DIAGNOSIS: NORMAL
EKG Q-T INTERVAL: 464 MS
EKG QRS DURATION: 86 MS
EKG QTC CALCULATION (BAZETT): 493 MS
EKG R AXIS: 70 DEGREES
EKG T AXIS: 5 DEGREES
EKG VENTRICULAR RATE: 68 BPM
EOSINOPHIL # BLD: 0.2 K/UL (ref 0–0.4)
EOSINOPHIL NFR BLD: 5 % (ref 0–7)
ERYTHROCYTE [DISTWIDTH] IN BLOOD BY AUTOMATED COUNT: 15.9 % (ref 11.5–14.5)
GLUCOSE BLD STRIP.AUTO-MCNC: 106 MG/DL (ref 65–117)
GLUCOSE BLD STRIP.AUTO-MCNC: 111 MG/DL (ref 65–117)
GLUCOSE BLD STRIP.AUTO-MCNC: 117 MG/DL (ref 74–106)
GLUCOSE BLD STRIP.AUTO-MCNC: 125 MG/DL (ref 65–117)
GLUCOSE BLD STRIP.AUTO-MCNC: 98 MG/DL (ref 65–117)
GLUCOSE SERPL-MCNC: 115 MG/DL (ref 65–100)
HCO3 BLDA-SCNC: 23 MMOL/L
HCT VFR BLD AUTO: 32.1 % (ref 36.6–50.3)
HGB BLD-MCNC: 10.1 G/DL (ref 12.1–17)
IMM GRANULOCYTES # BLD AUTO: 0 K/UL (ref 0–0.04)
IMM GRANULOCYTES NFR BLD AUTO: 0 % (ref 0–0.5)
LYMPHOCYTES # BLD: 1.9 K/UL (ref 0.8–3.5)
LYMPHOCYTES NFR BLD: 42 % (ref 12–49)
MAGNESIUM SERPL-MCNC: 1.5 MG/DL (ref 1.6–2.4)
MCH RBC QN AUTO: 29 PG (ref 26–34)
MCHC RBC AUTO-ENTMCNC: 31.5 G/DL (ref 30–36.5)
MCV RBC AUTO: 92.2 FL (ref 80–99)
MONOCYTES # BLD: 0.4 K/UL (ref 0–1)
MONOCYTES NFR BLD: 10 % (ref 5–13)
NEUTS SEG # BLD: 1.8 K/UL (ref 1.8–8)
NEUTS SEG NFR BLD: 41 % (ref 32–75)
NRBC # BLD: 0 K/UL (ref 0–0.01)
NRBC BLD-RTO: 0 PER 100 WBC
PCO2 BLD: 37.1 MMHG (ref 35–45)
PH BLD: 7.4 (ref 7.35–7.45)
PLATELET # BLD AUTO: 275 K/UL (ref 150–400)
PMV BLD AUTO: 10.2 FL (ref 8.9–12.9)
POTASSIUM BLD-SCNC: 3.9 MMOL/L (ref 3.5–5.5)
POTASSIUM SERPL-SCNC: 3.3 MMOL/L (ref 3.5–5.1)
RBC # BLD AUTO: 3.48 M/UL (ref 4.1–5.7)
SERVICE CMNT-IMP: ABNORMAL
SERVICE CMNT-IMP: NORMAL
SODIUM BLD-SCNC: 142 MMOL/L (ref 136–145)
SODIUM SERPL-SCNC: 138 MMOL/L (ref 136–145)
WBC # BLD AUTO: 4.5 K/UL (ref 4.1–11.1)

## 2024-02-21 PROCEDURE — 85025 COMPLETE CBC W/AUTO DIFF WBC: CPT

## 2024-02-21 PROCEDURE — 6370000000 HC RX 637 (ALT 250 FOR IP): Performed by: INTERNAL MEDICINE

## 2024-02-21 PROCEDURE — 6360000002 HC RX W HCPCS: Performed by: INTERNAL MEDICINE

## 2024-02-21 PROCEDURE — 2580000003 HC RX 258: Performed by: INTERNAL MEDICINE

## 2024-02-21 PROCEDURE — 80048 BASIC METABOLIC PNL TOTAL CA: CPT

## 2024-02-21 PROCEDURE — 6360000004 HC RX CONTRAST MEDICATION: Performed by: INTERNAL MEDICINE

## 2024-02-21 PROCEDURE — C8929 TTE W OR WO FOL WCON,DOPPLER: HCPCS

## 2024-02-21 PROCEDURE — 71275 CT ANGIOGRAPHY CHEST: CPT

## 2024-02-21 PROCEDURE — 2060000000 HC ICU INTERMEDIATE R&B

## 2024-02-21 PROCEDURE — 83735 ASSAY OF MAGNESIUM: CPT

## 2024-02-21 PROCEDURE — 6370000000 HC RX 637 (ALT 250 FOR IP): Performed by: HOSPITALIST

## 2024-02-21 PROCEDURE — 82962 GLUCOSE BLOOD TEST: CPT

## 2024-02-21 PROCEDURE — 93005 ELECTROCARDIOGRAM TRACING: CPT | Performed by: STUDENT IN AN ORGANIZED HEALTH CARE EDUCATION/TRAINING PROGRAM

## 2024-02-21 PROCEDURE — 36415 COLL VENOUS BLD VENIPUNCTURE: CPT

## 2024-02-21 RX ORDER — POTASSIUM CHLORIDE 750 MG/1
10 TABLET, FILM COATED, EXTENDED RELEASE ORAL ONCE
Status: COMPLETED | OUTPATIENT
Start: 2024-02-21 | End: 2024-02-21

## 2024-02-21 RX ORDER — TRAMADOL HYDROCHLORIDE 50 MG/1
50 TABLET ORAL ONCE
Status: COMPLETED | OUTPATIENT
Start: 2024-02-21 | End: 2024-02-21

## 2024-02-21 RX ORDER — MAGNESIUM SULFATE IN WATER 40 MG/ML
2000 INJECTION, SOLUTION INTRAVENOUS ONCE
Status: COMPLETED | OUTPATIENT
Start: 2024-02-21 | End: 2024-02-21

## 2024-02-21 RX ORDER — LANOLIN ALCOHOL/MO/W.PET/CERES
6 CREAM (GRAM) TOPICAL NIGHTLY PRN
Status: DISCONTINUED | OUTPATIENT
Start: 2024-02-21 | End: 2024-02-23 | Stop reason: HOSPADM

## 2024-02-21 RX ADMIN — METRONIDAZOLE 500 MG: 250 TABLET ORAL at 20:23

## 2024-02-21 RX ADMIN — MELATONIN 6 MG: at 02:30

## 2024-02-21 RX ADMIN — PANTOPRAZOLE SODIUM 40 MG: 40 TABLET, DELAYED RELEASE ORAL at 06:36

## 2024-02-21 RX ADMIN — APIXABAN 10 MG: 5 TABLET, FILM COATED ORAL at 20:27

## 2024-02-21 RX ADMIN — METRONIDAZOLE 500 MG: 250 TABLET ORAL at 13:48

## 2024-02-21 RX ADMIN — ACETAMINOPHEN 1000 MG: 500 TABLET ORAL at 08:48

## 2024-02-21 RX ADMIN — SODIUM CHLORIDE: 9 INJECTION, SOLUTION INTRAVENOUS at 06:51

## 2024-02-21 RX ADMIN — ENOXAPARIN SODIUM 40 MG: 100 INJECTION SUBCUTANEOUS at 08:46

## 2024-02-21 RX ADMIN — MELATONIN 6 MG: at 20:22

## 2024-02-21 RX ADMIN — MAGNESIUM SULFATE HEPTAHYDRATE 2000 MG: 40 INJECTION, SOLUTION INTRAVENOUS at 10:07

## 2024-02-21 RX ADMIN — ASPIRIN 81 MG: 81 TABLET, CHEWABLE ORAL at 08:49

## 2024-02-21 RX ADMIN — ACETAMINOPHEN 1000 MG: 500 TABLET ORAL at 20:22

## 2024-02-21 RX ADMIN — POTASSIUM BICARBONATE 40 MEQ: 782 TABLET, EFFERVESCENT ORAL at 10:50

## 2024-02-21 RX ADMIN — IOPAMIDOL 100 ML: 755 INJECTION, SOLUTION INTRAVENOUS at 19:10

## 2024-02-21 RX ADMIN — PERFLUTREN 1.5 ML: 6.52 INJECTION, SUSPENSION INTRAVENOUS at 15:56

## 2024-02-21 RX ADMIN — TRAMADOL HYDROCHLORIDE 50 MG: 50 TABLET ORAL at 00:48

## 2024-02-21 RX ADMIN — ACETAMINOPHEN 650 MG: 325 TABLET ORAL at 17:44

## 2024-02-21 RX ADMIN — ATORVASTATIN CALCIUM 20 MG: 20 TABLET, FILM COATED ORAL at 08:47

## 2024-02-21 RX ADMIN — ONDANSETRON 4 MG: 2 INJECTION INTRAMUSCULAR; INTRAVENOUS at 17:13

## 2024-02-21 RX ADMIN — POTASSIUM CHLORIDE 10 MEQ: 750 TABLET, FILM COATED, EXTENDED RELEASE ORAL at 02:30

## 2024-02-21 RX ADMIN — LEVOFLOXACIN 750 MG: 750 TABLET, FILM COATED ORAL at 08:49

## 2024-02-21 RX ADMIN — METRONIDAZOLE 500 MG: 250 TABLET ORAL at 08:47

## 2024-02-21 ASSESSMENT — PAIN SCALES - GENERAL
PAINLEVEL_OUTOF10: 0
PAINLEVEL_OUTOF10: 7

## 2024-02-21 ASSESSMENT — PAIN DESCRIPTION - LOCATION: LOCATION: BACK

## 2024-02-21 ASSESSMENT — PAIN DESCRIPTION - DESCRIPTORS: DESCRIPTORS: ACHING

## 2024-02-21 ASSESSMENT — PAIN DESCRIPTION - ORIENTATION: ORIENTATION: POSTERIOR

## 2024-02-21 NOTE — PROGRESS NOTES
-- -- 80 14 98 % -- --   02/21/24 1200 95/61 -- -- 68 11 96 % -- --   02/21/24 1045 105/68 98 °F (36.7 °C) Oral 78 12 97 % -- --   02/21/24 0849 103/77 -- -- 78 16 98 % -- --   02/21/24 0800 105/60 97.3 °F (36.3 °C) Oral 77 14 93 % -- --   02/21/24 0725 93/74 97.3 °F (36.3 °C) Oral 77 13 97 % -- --   02/21/24 0401 95/63 -- -- 81 14 94 % -- --   02/21/24 0224 92/65 98.4 °F (36.9 °C) Oral 78 15 96 % -- --   02/21/24 0048 -- -- -- -- 16 -- -- --   02/20/24 2347 107/67 97.7 °F (36.5 °C) Oral 81 17 97 % -- --   02/20/24 2023 123/79 98 °F (36.7 °C) Oral 86 18 96 % -- --   02/20/24 1949 123/79 97.8 °F (36.6 °C) Oral 86 18 96 % -- --   02/20/24 1916 (!) 117/94 -- -- 80 16 95 % -- --   02/20/24 1845 116/78 97.6 °F (36.4 °C) Oral 75 16 96 % -- --         Intake/Output Summary (Last 24 hours) at 2/21/2024 1818  Last data filed at 2/21/2024 1549  Gross per 24 hour   Intake --   Output 402 ml   Net -402 ml        I had a face to face encounter and independently examined this patient on 2/21/2024, as outlined below:  PHYSICAL EXAM:  General: Alert, cooperative  EENT:  EOMI. Anicteric sclerae.  Resp:  CTA bilaterally, no wheezing or rales.  No accessory muscle use  CV:  Regular  rhythm,  No edema  GI:  Soft, Non distended, Non tender.  +Bowel sounds  Neurologic:  Confused normal speech,   Psych:   Not agitated skin:  No rashes.  No jaundice    Reviewed most current lab test results and cultures  YES  Reviewed most current radiology test results   YES  Review and summation of old records today    NO  Reviewed patient's current orders and MAR    YES  PMH/SH reviewed - no change compared to H&P    Procedures: see electronic medical records for all procedures/Xrays and details which were not copied into this note but were reviewed prior to creation of Plan.      LABS:  I reviewed today's most current labs and imaging studies.  Pertinent labs include:  Recent Labs     02/20/24  0948 02/21/24  0059   WBC 4.5 4.5   HGB 10.7* 10.1*   HCT  34.4* 32.1*    275     Recent Labs     02/20/24  0948 02/20/24  1315 02/21/24  0059     --  138   K 4.1  --  3.3*   *  --  110*   CO2 24  --  22   GLUCOSE 168*  --  115*   BUN 13  --  11   CREATININE 1.28 1.0 0.97   CALCIUM 8.0*  --  7.8*   MG 1.7  --  1.5*   LABALBU 2.3*  --   --    BILITOT 0.3  --   --    AST 14*  --   --    ALT 10*  --   --        Signed: Tory Zavala MD

## 2024-02-21 NOTE — H&P
Hospitalist Admission Note    NAME:   Karson Rodriguez   : 1940   MRN: [id number]     Date/Time: 2024 7:06 PM    Patient PCP: Sybil Manuel APRN - NP    ______________________________________________________________________  Given the patient's current clinical presentation, I have a high level of concern for decompensation if discharged from the emergency department.  Complex decision making was performed, which includes reviewing the patient's available past medical records, laboratory results, and x-ray films.       My assessment of this patient's clinical condition and my plan of care is as follows.    Assessment / Plan:  Syncopal episode   A-fib on the EKG  Hypotension  Will admit to stepdown unit, EKG showed A-fib which is new compared to EKG from December.  Patient has a history of A-fib per chart, not on any blood thinners  Will hold home dose of metoprolol for now, restart once blood pressure within acceptable limit  Heart rate around 75  Will check 2D echo, troponin is within normal limit  Will consult cardiology  Patient is already on aspirin and atorvastatin  Will check D-dimer, if elevated, will get CTA of the chest  Diverticulitis  Patient is treated with p.o. Cipro and Flagyl as outpatient, will continue.  Starting day  for 10 days    CKD stage IV  Creatinine at baseline    Hypertension  Hold BP meds  Hyperlipidemia  Continue with statin  Diabetes mellitus  Hold oral diabetic meds, start sliding scale insulin    History of  whipple procedure  Mild dementia  Medical Decision Making:   I personally reviewed labs: CBC BMP  I personally reviewed imaging: Chest x-ray  I personally reviewed EKG: EKG which showed A-fib  Toxic drug monitoring: CBC while on Lovenox, blood sugar while on sliding scale insulin  Discussed case with: ED provider. After discussion I am in agreement that acuity of patient's medical condition necessitates hospital stay.      Code Status: Full code  DVT  was achieved through use of a standardized protocol tailored for this examination and automatic exposure control for dose modulation. FINDINGS: LOWER THORAX: Cardiomegaly. Coronary artery calcifications. Aortic valve plane calcifications. Mitral annulus calcifications. Small right pleural effusion. LIVER: Steatosis. No mass. Biliary, expected postoperatively. BILIARY TREE: Cholecystectomy. CBD is not dilated. SPLEEN: within normal limits. PANCREAS: Status post Whipple. No mass or ductal dilatation. ADRENALS: Unremarkable. KIDNEYS: No mass, calculus, or hydronephrosis. Several bilateral cysts and subcentimeter hypodensities which are too small to accurately characterize; no dedicated follow-up recommended. STOMACH: Distal gastrectomy with Billroth I gastrojejunostomy. SMALL BOWEL: No dilatation or wall thickening. COLON: No dilatation or wall thickening. Diverticulosis. Subtle fat stranding adjacent to the descending colon, could reflect mild acute diverticulitis. APPENDIX: Normal. PERITONEUM: No ascites or pneumoperitoneum. RETROPERITONEUM: No lymphadenopathy. Atherosclerosis with stable 3.6 cm x 3.4 cm infrarenal abdominal aortic aneurysm. REPRODUCTIVE ORGANS: Prostatomegaly. URINARY BLADDER: No mass or calculus. BONES: Healing lateral right 6-7 rib fractures. Bilateral femoral head avascular necrosis, without definite subchondral collapse. Degenerative changes.. ABDOMINAL WALL: No mass or hernia. ADDITIONAL COMMENTS: N/A     1.  Findings suggestive of mild acute descending colonic diverticulitis. 2.  Healing right 6-7 rib fractures. 3.  Small right pleural effusion. 4.  Incidental findings as above.     XR RIBS RIGHT INCLUDE CHEST (MIN 3 VIEWS)    Result Date: 2/15/2024  EXAM: XR RIBS RIGHT INCLUDE CHEST (MIN 3 VIEWS) INDICATION: chest pain COMPARISON: Chest radiograph 12/14/2023. TECHNIQUE: Chest, one view; right RIBS, 3 views FINDINGS: Cardiac mediastinal silhouette is upper limits of normal in size. Lungs and

## 2024-02-21 NOTE — PROGRESS NOTES
1930 TRANSFER - IN REPORT:    Verbal report received from Dilcia RAE on Karson Rodriguez  being received from Amos RAE for routine progression of patient care      Report consisted of patient's Situation, Background, Assessment and   Recommendations(SBAR).     Information from the following report(s) Nurse Handoff Report, MAR, Recent Results, and Cardiac Rhythm NSR/A.Fib  was reviewed with the receiving nurse.    Opportunity for questions and clarification was provided.      2030 Pt arrived via stretcher with ER staff Dilcia. Assessment completed upon patient's arrival to unit and care assumed.     2130 Sent the D. Dimer sample.    0000 Pt requesting for medicine to make him sleep and for pain in back and leg. Dr. Judd informed (Pain meds ordered).    0212 Latest potassium is 3.3. Dr. Judd informed (New orders given and carried out.    End of Shift Note    Bedside shift change report given to Dion RAE (oncoming nurse) by Amos Hernandez RN (offgoing nurse).  Report included the following information SBAR, MAR, Recent Results, and Cardiac Rhythm NSR/A.Fib    Shift worked:  7pm-7am     Shift summary and any significant changes:     Morning bloods collected and sent to lab    See above   Concerns for physician to address:        Zone phone for oncoming shift:           Activity:     Number times ambulated in hallways past shift: 0  Number of times OOB to chair past shift: 0    Cardiac:   Cardiac Monitoring: Yes           Access:  Current line(s): PIV     Genitourinary:   Urinary status: voiding       GI:     Current diet:  ADULT DIET; Regular; Low Fat/Low Chol/High Fiber/DARLIN  Passing flatus: YES  Tolerating current diet: YES       Pain Management:   Patient states pain is manageable on current regimen: YES    Skin:     Interventions: specialty bed, float heels, increase time out of bed, foam dressing, PT/OT consult, limit briefs, internal/external urinary devices, and nutritional support    Patient Safety:  Fall Score:

## 2024-02-21 NOTE — CONSULTS
artifact, probably sinus rhythm with PACs versus atrial fibrillation    Echocardiogram:  Pending    Other cardiac testing:    Other imaging:        Signed:  Bello Mendoza MD  Interventional Cardiology  2/21/2024

## 2024-02-21 NOTE — ED NOTES
TRANSFER - OUT REPORT:    Verbal report given to KYRA Trinidad on Karson Rodriguez  being transferred to [facility] for routine progression of patient care       Report consisted of patient's Situation, Background, Assessment and   Recommendations(SBAR).     Information from the following report(s) Nurse Handoff Report, ED Encounter Summary, ED SBAR, and MAR was reviewed with the receiving nurse.    Dolores Fall Assessment:    Presents to emergency department  because of falls (Syncope, seizure, or loss of consciousness): Yes  Age > 70: Yes  Altered Mental Status, Intoxication with alcohol or substance confusion (Disorientation, impaired judgment, poor safety awaremess, or inability to follow instructions): Yes  Impaired Mobility: Ambulates or transfers with assistive devices or assistance; Unable to ambulate or transer.: Yes  Nursing Judgement: Yes          Lines:   Peripheral IV Left Antecubital (Active)        Opportunity for questions and clarification was provided.      Patient transported with:  Monitor and Registered Nurse

## 2024-02-21 NOTE — CARE COORDINATION
Care Management Initial Assessment       RUR:21%  Readmission? No  1st IM letter given? Yes   1st  letter given: No     02/21/24 1721   Service Assessment   Patient Orientation Other (see comment)  (spoke with patient's son)   Cognition Other (see comment)   History Provided By Child/Family   Primary Caregiver Other (Comment)  (custodial)   Support Systems Children   Patient's Healthcare Decision Maker is: Legal Next of Kin   PCP Verified by CM Yes   Last Visit to PCP Within last 3 months   Prior Functional Level Assistance with the following:;Bathing;Dressing;Toileting;Feeding;Cooking;Housework;Shopping;Mobility   Current Functional Level Assistance with the following:;Bathing;Dressing;Toileting;Feeding;Cooking;Housework;Shopping;Mobility   Can patient return to prior living arrangement Yes   Ability to make needs known: Good   Family able to assist with home care needs: Yes   Would you like for me to discuss the discharge plan with any other family members/significant others, and if so, who? Yes  (son)   Financial Resources Medicare   Community Resources Assisted Living   Social/Functional History   Lives With Other (comment)  (EVELYN)   Type of Home Assisted living   Home Equipment Wheelchair-manual;Walker, rolling;Hospital bed   Receives Help From Other (comment)  (custodial)   ADL Assistance Needs assistance   Toileting Needs assistance   Homemaking Assistance Needs assistance   Homemaking Responsibilities No   Ambulation Assistance Needs assistance   Transfer Assistance Needs assistance   Active  No   Mode of Transportation Van   Occupation Retired   Discharge Planning   Type of Residence Assisted living   Living Arrangements Other (Comment)  (EVELYN)   Current Services Prior To Admission None   Potential Assistance Needed N/A   DME Ordered? No   Potential Assistance Purchasing Medications No   Type of Home Care Services None   Patient expects to be discharged to: Assisted living   History of falls? 0   Services

## 2024-02-21 NOTE — PLAN OF CARE
Problem: Discharge Planning  Goal: Discharge to home or other facility with appropriate resources  Outcome: Progressing     Problem: Skin/Tissue Integrity  Goal: Absence of new skin breakdown  Description: 1.  Monitor for areas of redness and/or skin breakdown  2.  Assess vascular access sites hourly  3.  Every 4-6 hours minimum:  Change oxygen saturation probe site  4.  Every 4-6 hours:  If on nasal continuous positive airway pressure, respiratory therapy assess nares and determine need for appliance change or resting period.  Outcome: Progressing     Problem: ABCDS Injury Assessment  Goal: Absence of physical injury  Outcome: Progressing     Problem: Safety - Adult  Goal: Free from fall injury  Outcome: Progressing     Problem: Confusion  Goal: Confusion, delirium, dementia, or psychosis is improved or at baseline  Description: INTERVENTIONS:  1. Assess for possible contributors to thought disturbance, including medications, impaired vision or hearing, underlying metabolic abnormalities, dehydration, psychiatric diagnoses, and notify attending LIP  2. Spiro high risk fall precautions, as indicated  3. Provide frequent short contacts to provide reality reorientation, refocusing and direction  4. Decrease environmental stimuli, including noise as appropriate  5. Monitor and intervene to maintain adequate nutrition, hydration, elimination, sleep and activity  6. If unable to ensure safety without constant attention obtain sitter and review sitter guidelines with assigned personnel  7. Initiate Psychosocial CNS and Spiritual Care consult, as indicated  Outcome: Progressing

## 2024-02-22 ENCOUNTER — APPOINTMENT (OUTPATIENT)
Facility: HOSPITAL | Age: 84
End: 2024-02-22
Payer: MEDICARE

## 2024-02-22 LAB
ECHO BSA: 2.15 M2
GLUCOSE BLD STRIP.AUTO-MCNC: 100 MG/DL (ref 65–117)
GLUCOSE BLD STRIP.AUTO-MCNC: 104 MG/DL (ref 65–117)
GLUCOSE BLD STRIP.AUTO-MCNC: 141 MG/DL (ref 65–117)
GLUCOSE BLD STRIP.AUTO-MCNC: 93 MG/DL (ref 65–117)
SERVICE CMNT-IMP: ABNORMAL
SERVICE CMNT-IMP: NORMAL

## 2024-02-22 PROCEDURE — 97530 THERAPEUTIC ACTIVITIES: CPT

## 2024-02-22 PROCEDURE — 6370000000 HC RX 637 (ALT 250 FOR IP): Performed by: HOSPITALIST

## 2024-02-22 PROCEDURE — 6360000002 HC RX W HCPCS: Performed by: INTERNAL MEDICINE

## 2024-02-22 PROCEDURE — 6370000000 HC RX 637 (ALT 250 FOR IP): Performed by: INTERNAL MEDICINE

## 2024-02-22 PROCEDURE — 97162 PT EVAL MOD COMPLEX 30 MIN: CPT

## 2024-02-22 PROCEDURE — 2060000000 HC ICU INTERMEDIATE R&B

## 2024-02-22 PROCEDURE — 82962 GLUCOSE BLOOD TEST: CPT

## 2024-02-22 PROCEDURE — 97165 OT EVAL LOW COMPLEX 30 MIN: CPT

## 2024-02-22 PROCEDURE — 93970 EXTREMITY STUDY: CPT

## 2024-02-22 PROCEDURE — 2580000003 HC RX 258: Performed by: INTERNAL MEDICINE

## 2024-02-22 RX ORDER — CASTOR OIL AND BALSAM, PERU 788; 87 MG/G; MG/G
OINTMENT TOPICAL 2 TIMES DAILY
Status: DISCONTINUED | OUTPATIENT
Start: 2024-02-22 | End: 2024-02-23 | Stop reason: HOSPADM

## 2024-02-22 RX ORDER — SODIUM CHLORIDE 9 MG/ML
INJECTION, SOLUTION INTRAVENOUS CONTINUOUS
Status: ACTIVE | OUTPATIENT
Start: 2024-02-22 | End: 2024-02-23

## 2024-02-22 RX ADMIN — Medication: at 14:33

## 2024-02-22 RX ADMIN — PANTOPRAZOLE SODIUM 40 MG: 40 TABLET, DELAYED RELEASE ORAL at 06:30

## 2024-02-22 RX ADMIN — ATORVASTATIN CALCIUM 20 MG: 20 TABLET, FILM COATED ORAL at 09:02

## 2024-02-22 RX ADMIN — ONDANSETRON 4 MG: 2 INJECTION INTRAMUSCULAR; INTRAVENOUS at 21:56

## 2024-02-22 RX ADMIN — Medication: at 20:27

## 2024-02-22 RX ADMIN — APIXABAN 10 MG: 5 TABLET, FILM COATED ORAL at 20:28

## 2024-02-22 RX ADMIN — METRONIDAZOLE 500 MG: 250 TABLET ORAL at 20:28

## 2024-02-22 RX ADMIN — SODIUM CHLORIDE: 9 INJECTION, SOLUTION INTRAVENOUS at 15:45

## 2024-02-22 RX ADMIN — ASPIRIN 81 MG: 81 TABLET, CHEWABLE ORAL at 09:02

## 2024-02-22 RX ADMIN — MELATONIN 6 MG: at 21:56

## 2024-02-22 RX ADMIN — APIXABAN 10 MG: 5 TABLET, FILM COATED ORAL at 09:02

## 2024-02-22 RX ADMIN — METRONIDAZOLE 500 MG: 250 TABLET ORAL at 14:33

## 2024-02-22 RX ADMIN — ACETAMINOPHEN 1000 MG: 500 TABLET ORAL at 09:02

## 2024-02-22 RX ADMIN — LEVOFLOXACIN 750 MG: 750 TABLET, FILM COATED ORAL at 09:02

## 2024-02-22 RX ADMIN — ACETAMINOPHEN 1000 MG: 500 TABLET ORAL at 20:28

## 2024-02-22 RX ADMIN — METRONIDAZOLE 500 MG: 250 TABLET ORAL at 09:02

## 2024-02-22 ASSESSMENT — PAIN SCALES - GENERAL
PAINLEVEL_OUTOF10: 0

## 2024-02-22 NOTE — PLAN OF CARE
Problem: Physical Therapy - Adult  Goal: By Discharge: Performs mobility at highest level of function for planned discharge setting.  See evaluation for individualized goals.  Description: FUNCTIONAL STATUS PRIOR TO ADMISSION: Per chart, as pt is poor historian, patient ambulated with assist with use of RW but also has a wc if needed    HOME SUPPORT PRIOR TO ADMISSION: Patient resides at [facility] with assist for mobility and ADLs    Physical Therapy Goals  Initiated 2/22/2024  1.  Patient will move from supine to sit and sit to supine in bed with contact guard assist within 7 day(s).    2.  Patient will perform sit to stand with contact guard assist within 7 day(s).  3.  Patient will transfer from bed to chair and chair to bed with contact guard assist using the least restrictive device within 7 day(s).  4.  Patient will ambulate with contact guard assist for 100 feet with the least restrictive device within 7 day(s).     Outcome: Progressing     PHYSICAL THERAPY EVALUATION    Patient: Karson Rodriguez (83 y.o. male)  Date: 2/22/2024  Primary Diagnosis: Syncope and collapse [R55]       Precautions: Restrictions/Precautions: Fall Risk                      ASSESSMENT :   DEFICITS/IMPAIRMENTS:   The patient is limited by decreased functional mobility, strength, body mechanics, activity tolerance, endurance, safety awareness, balance, posture. Patient is a 82 y/o male who was admitted to the hospital due to syncopal episode. Patient is being treated for A-fib and R rib fx. Patient received laying in bed pleasantly confused and agreeable to activity. Patient educated on the purpose and benefits of skilled PT and goals to be addressed with pt verbalizing limited understanding due to cognition. Patient requires continued redirection to task during session. Pt directed in supine to sit with instruction for sequencing. Pt sitting EOB and directed to scoot fwd EOB to place feet on floor with pt not following direction. Pt returned

## 2024-02-22 NOTE — CARE COORDINATION
Transition of Care Plan:    RUR: 21%  Prior Level of Functioning: Needs assistance with: Bathing;Dressing;Toileting;Feeding;Cooking;Housework;Shopping;Mobility   Disposition: senior care - [facility]  If SNF or IPR: Date FOC offered: N/A  Follow up appointments: PCP needed due to high RUR  DME needed: None  Transportation at discharge: TBD - CM to confirm whether [facility] can assist  IM/IMM Medicare/ letter given: Will need 2nd IM after 2/22 @ 5:51 PM  Is patient a  and connected with [facility]? No   If yes, was  transfer form completed and [facility] notified? N/A  Caregiver Contact: [facility]: [phone]   Discharge Caregiver contacted prior to discharge? CM to coordinate with senior care upon discharge  Care Conference needed? No  Barriers to discharge:  Medical stability     IDR: CM discussed pt's discharge plan with interdisciplinary team in IDR at 10:00 AM. MD reports that pt is still experiencing some confusion today. Pt's anticipated discharge is set for tomorrow, 2/23/24.    Elvira Marlow LMSW,   [phone]

## 2024-02-22 NOTE — PROGRESS NOTES
At approximately 1400. patient bladder scanned due to patient not urinating since 7am when this RN took over. Bladder scan volume -210cc as documented. Patient has been eating and drinking very minimally. Dr. Mendoza made aware and orders placed by Dr. Mendoza for IVF. IVF infused as ordered (see MAR). Patient had two episodes of incontinence(urine, unable to measure but was a moderate amount of urine) around 1645 and 1800.

## 2024-02-22 NOTE — PROGRESS NOTES
Progress Note      2/22/2024 7:22 AM  NAME: Karson Rodriguez   MRN:  [id number]   Admit Diagnosis: Syncope and collapse [R55]                    IP Cardiology Consult        Date of consult:  02/21/24  Date of admission: 2/20/2024  Primary Cardiologist: LAURIE  Physician Requesting consult: Dr Snyder         Assessment:     Problem list:   Possible syncopal episode, with low blood pressure  Hypotension on presentation  Recent diverticulitis, on antibiotic  Paroxysmal atrial fibrillation, not on anticoagulation likely due to fall risk and low atrial fibrillation burden  CKD   Hypertension  Hyperlipidemia  Diabetes  Dementia     Unclear etiology of syncope, but did have low blood pressure and recent diverticulitis.  That may be contributing.  EKG with atrial fibrillation versus possible sinus and PACs.  Currently patient is in sinus rhythm.     Echo 2/20- EF 65-70%. Normal RV      Recommendations:     Continue aspirin  IV hydration  Check orthostatic  Continue to hold lisinopril and metoprolol  Continue statin  Echocardiogram showed normal LVEF  Likely not on anticoagulation due to fall risk and low A-fib burden  Will consider event monitor as outpatient to evaluate for bradycardia and A-fib burden     Continue to monitor on telemetry   No further IP cardiac eval            Subjective:     HPI:   Hard of hearing   No CP or SOB      ROS: No CP, SOB, Abd pain, nausea, vomiting, syncope, palpitations, new focal neurological symptoms     Objective:      Physical Exam:    Last 24hrs VS reviewed since prior progress note. Most recent are:    /72   Pulse 79   Temp 98.4 °F (36.9 °C) (Oral)   Resp 17   Ht 1.778 m (5' 10\")   Wt 93.4 kg (205 lb 14.6 oz)   SpO2 94%   BMI 29.54 kg/m²     Intake/Output Summary (Last 24 hours) at 2/22/2024 0722  Last data filed at 2/21/2024 2352  Gross per 24 hour   Intake --   Output 102 ml   Net -102 ml         General: Alert and oriented x3, no acute distress   Neck: Supple

## 2024-02-22 NOTE — PLAN OF CARE
Problem: Occupational Therapy - Adult  Goal: By Discharge: Performs self-care activities at highest level of function for planned discharge setting.  See evaluation for individualized goals.  Description: FUNCTIONAL STATUS PRIOR TO ADMISSION:  Pt is a poor historian. He reports having a walker and wheelchair at home. Resides at [facility] where he receives assistance for ADLs.       Occupational Therapy Goals:  Initiated 2/22/2024  1.  Patient will perform grooming with Set-up in sitting within 7 day(s).  2.  Patient will perform upper body dressing with Set-up within 7 day(s).  3.  Patient will perform toilet transfers with Moderate Assist  within 7 day(s).  4.  Patient will perform all aspects of toileting with Moderate Assist within 7 day(s).  5.  Patient will participate in upper extremity therapeutic exercise/activities with Supervision for 5 minutes within 7 day(s).    Outcome: Progressing  OCCUPATIONAL THERAPY EVALUATION    Patient: Karson Rodriguez (83 y.o. male)  Date: 2/22/2024  Primary Diagnosis: Syncope and collapse [R55]         Precautions: Fall Risk                  ASSESSMENT :  The patient is limited by decreased functional mobility, independence in ADLs, ROM, strength, activity tolerance, safety awareness, cognition, command following, attention/concentration, and balance following admission for syncope and collapse. Pt resides at [facility] significant for dementia. He was received semisupine in bed, oriented to self. He required frequent redirection to task, repetition of instruction and multimodal cueing for participation throughout session. He transferred supine>sit with max A x2 and increased time, demo'd fair sitting balance with posterior lean. After a few minutes EOB pt requested to return to supine, required mod A x2 for sit>supine and total A to scoot up in bed. Pt does demo ability to bridge. He remained in bed at end of session with needs met and VSS. Pt will benefit from continued therapy  Control: Occasional accidents or needs help with device  Bladder Control: Occasional accidents or needs help with device  Toilet Transfers: Cannot perform activity  Chair/Bed Trannsfers: Able to sit, but needs maximum assistance to transfer  Ambulation: Cannot perform activity  Stairs: Cannot perform activity  Total Barthel Index Score: 20       The Barthel ADL Index: Guidelines  1. The index should be used as a record of what a patient does, not as a record of what a patient could do.  2. The main aim is to establish degree of independence from any help, physical or verbal, however minor and for whatever reason.  3. The need for supervision renders the patient not independent.  4. A patient's performance should be established using the best available evidence. Asking the patient, friends/relatives and nurses are the usual sources, but direct observation and common sense are also important. However direct testing is not needed.  5. Usually the patient's performance over the preceding 24-48 hours is important, but occasionally longer periods will be relevant.  6. Middle categories imply that the patient supplies over 50 per cent of the effort.  7. Use of aids to be independent is allowed.    Score Interpretation (from Steph 1997)    Independent   60-79 Minimally independent   40-59 Partially dependent   20-39 Very dependent   <20 Totally dependent     -Ela Fuller., Barthel, D.W. (1965). Functional evaluation: the Barthel Index. Md State Med J 142.  -MARVIN Choi, KENNA Lui (1997). The Barthel activities of daily living index: self-reporting versus actual performance in the old (> or = 75 years). Journal of American Geriatric Society 45(7), 832-836.   -BERENICE WaltersF, ROSALINDA Martins., DAMIÁN Jones., Vic, A.J. (1999). Measuring the change in disability after inpatient rehabilitation; comparison of the responsiveness of the Barthel Index and Functional [place] Measure. Journal of Neurology,

## 2024-02-22 NOTE — PLAN OF CARE
Problem: Discharge Planning  Goal: Discharge to home or other facility with appropriate resources  Outcome: Progressing     Problem: Skin/Tissue Integrity  Goal: Absence of new skin breakdown  Description: 1.  Monitor for areas of redness and/or skin breakdown  2.  Assess vascular access sites hourly  3.  Every 4-6 hours minimum:  Change oxygen saturation probe site  4.  Every 4-6 hours:  If on nasal continuous positive airway pressure, respiratory therapy assess nares and determine need for appliance change or resting period.  Outcome: Progressing     Problem: ABCDS Injury Assessment  Goal: Absence of physical injury  Outcome: Progressing     Problem: Safety - Adult  Goal: Free from fall injury  Outcome: Progressing     Problem: Confusion  Goal: Confusion, delirium, dementia, or psychosis is improved or at baseline  Description: INTERVENTIONS:  1. Assess for possible contributors to thought disturbance, including medications, impaired vision or hearing, underlying metabolic abnormalities, dehydration, psychiatric diagnoses, and notify attending LIP  2. [place] high risk fall precautions, as indicated  3. Provide frequent short contacts to provide reality reorientation, refocusing and direction  4. Decrease environmental stimuli, including noise as appropriate  5. Monitor and intervene to maintain adequate nutrition, hydration, elimination, sleep and activity  6. If unable to ensure safety without constant attention obtain sitter and review sitter guidelines with assigned personnel  7. Initiate Psychosocial CNS and Spiritual Care consult, as indicated  Outcome: Progressing     Problem: Pain  Goal: Verbalizes/displays adequate comfort level or baseline comfort level  Outcome: Progressing     Problem: Chronic Conditions and Co-morbidities  Goal: Patient's chronic conditions and co-morbidity symptoms are monitored and maintained or improved  Outcome: Progressing

## 2024-02-22 NOTE — PROGRESS NOTES
Hospitalist Progress Note    NAME:   Karson Rodriguez   : 1940   MRN: [id number]     Date/Time: 2024 11:37 AM  Patient PCP: Sybil Manuel APRN - NP    Estimated discharge date:   Barriers: Doppler lower extremity, PT/OT eval      Assessment / Plan:    Syncopal episode   A-fib on the EKG  Hypotension  Pulmonary embolism, new   EKG showed A-fib which is new compared to EKG from December.  Patient has a history of A-fib per chart, not on any blood thinners  Appreciate cardiology consult  Continue aspirin, IV fluid  Orthostatic pending  Continue to hold lisinopril and metoprolol  Echo with normal EF, normal RV      CTA chest showed bilateral PE  Denies any prior history of PE  Placed on Eliquis yesterday  Check Doppler of lower extremity          Diverticulitis  Patient is treated with p.o. Cipro and Flagyl as outpatient, will continue.  Starting day  for 10 days     CKD stage IV  Creatinine at baseline     Hypertension  Hold BP meds  Hyperlipidemia  Continue with statin  Diabetes mellitus  Hold oral diabetic meds, start sliding scale insulin     History of  whipple procedure  Mild dementia          Medical Decision Making:   I personally reviewed labs: CBC BMP  I personally reviewed imaging: X-ray  I personally reviewed EKG:  Toxic drug monitoring:   Discussed case with: Patient RN IDR        Code Status: Full code  DVT Prophylaxis: Lovenox  GI Prophylaxis:    Subjective:     Chief Complaint / Reason for Physician Visit  \" Followed up for syncope.  Patient denies any dizziness patient patient appears confused\".  Discussed with RN events overnight.   Feels okay, says has not walked around in the room yet no prior history of DVT or PE.  No recent trauma    Objective:     VITALS:   Last 24hrs VS reviewed since prior progress note. Most recent are:  Patient Vitals for the past 24 hrs:   BP Temp Temp src Pulse Resp SpO2 Height Weight   24 1115 137/68 -- -- 78 21 98 % -- --   24 0800

## 2024-02-22 NOTE — PLAN OF CARE
Problem: Discharge Planning  Goal: Discharge to home or other facility with appropriate resources  2/22/2024 0942 by Elisa Panda RN  Outcome: Progressing  Flowsheets (Taken 2/22/2024 0800)  Discharge to home or other facility with appropriate resources:   Identify barriers to discharge with patient and caregiver   Arrange for needed discharge resources and transportation as appropriate   Identify discharge learning needs (meds, wound care, etc)  2/21/2024 2136 by Amos Hernandez RN  Outcome: Progressing     Problem: Skin/Tissue Integrity  Goal: Absence of new skin breakdown  Description: 1.  Monitor for areas of redness and/or skin breakdown  2.  Assess vascular access sites hourly  3.  Every 4-6 hours minimum:  Change oxygen saturation probe site  4.  Every 4-6 hours:  If on nasal continuous positive airway pressure, respiratory therapy assess nares and determine need for appliance change or resting period.  2/22/2024 0942 by Elisa Panda RN  Outcome: Progressing  2/21/2024 2136 by Amos Hernandez RN  Outcome: Progressing     Problem: ABCDS Injury Assessment  Goal: Absence of physical injury  2/21/2024 2136 by Amos Hernandez RN  Outcome: Progressing     Problem: Safety - Adult  Goal: Free from fall injury  2/22/2024 0942 by Elisa Panda RN  Outcome: Progressing  2/21/2024 2136 by Amos Hernandez RN  Outcome: Progressing     Problem: Confusion  Goal: Confusion, delirium, dementia, or psychosis is improved or at baseline  Description: INTERVENTIONS:  1. Assess for possible contributors to thought disturbance, including medications, impaired vision or hearing, underlying metabolic abnormalities, dehydration, psychiatric diagnoses, and notify attending LIP  2. [place] high risk fall precautions, as indicated  3. Provide frequent short contacts to provide reality reorientation, refocusing and direction  4. Decrease environmental stimuli, including noise as appropriate  5. Monitor and intervene to  maintain adequate nutrition, hydration, elimination, sleep and activity  6. If unable to ensure safety without constant attention obtain sitter and review sitter guidelines with assigned personnel  7. Initiate Psychosocial CNS and Spiritual Care consult, as indicated  2/22/2024 0942 by Elisa Panda RN  Outcome: Progressing  Flowsheets (Taken 2/22/2024 0800)  Effect of thought disturbance (confusion, delirium, dementia, or psychosis) are managed with adequate functional status:   Assess for contributors to thought disturbance, including medications, impaired vision or hearing, underlying metabolic abnormalities, dehydration, psychiatric diagnoses, notify LIP   Decrease environmental stimuli, including noise as appropriate  2/21/2024 2136 by Amos Hernandez RN  Outcome: Progressing     Problem: Pain  Goal: Verbalizes/displays adequate comfort level or baseline comfort level  2/22/2024 0942 by Elisa Panda RN  Outcome: Progressing  2/21/2024 2136 by Amos Hernandez RN  Outcome: Progressing     Problem: Chronic Conditions and Co-morbidities  Goal: Patient's chronic conditions and co-morbidity symptoms are monitored and maintained or improved  2/22/2024 0942 by Elisa Panda RN  Outcome: Progressing  Flowsheets (Taken 2/22/2024 0800)  Care Plan - Patient's Chronic Conditions and Co-Morbidity Symptoms are Monitored and Maintained or Improved:   Monitor and assess patient's chronic conditions and comorbid symptoms for stability, deterioration, or improvement   Collaborate with multidisciplinary team to address chronic and comorbid conditions and prevent exacerbation or deterioration  2/21/2024 2136 by Amos Hernandez RN  Outcome: Progressing

## 2024-02-22 NOTE — PROGRESS NOTES
1900 Bedside and Verbal shift change report given to Amos RAE (oncoming nurse) by Dion RAE (offgoing nurse). Report included the following information Nurse Handoff Report, MAR, Recent Results, and Cardiac Rhythm NSR/A.Fib .  Pt out of room, Pt currently in CT scan.    2015 Pt came back from CT scan and placed comfortably on bed    End of Shift Note    Bedside shift change report given to Elisa RAE (oncoming nurse) by Amos Hernandez RN (offgoing nurse).  Report included the following information SBAR, MAR, Recent Results, and Cardiac Rhythm NSR/A.Fib    Shift worked:  7pm-7am     Shift summary and any significant changes:    Kept comfortable and needs attended       Concerns for physician to address:        Zone phone for oncoming shift:           Activity:     Number times ambulated in hallways past shift: 0  Number of times OOB to chair past shift: 0    Cardiac:   Cardiac Monitoring: Yes           Access:  Current line(s): PIV     Genitourinary:   Urinary status: voiding    GI:     Current diet:  ADULT DIET; Regular; Low Fat/Low Chol/High Fiber/DARLIN  Passing flatus: YES  Tolerating current diet: YES       Pain Management:   Patient states pain is manageable on current regimen: YES    Skin:     Interventions: specialty bed, float heels, increase time out of bed, foam dressing, PT/OT consult, limit briefs, internal/external urinary devices, and nutritional support    Patient Safety:  Fall Score:    Interventions: bed/chair alarm, assistive device (walker, cane. etc), gripper socks, pt to call before getting OOB, stay with me (per policy), and gait belt       Length of Stay:  Expected LOS: 3  Actual LOS: 1      Amos Hernandez RN

## 2024-02-23 VITALS
RESPIRATION RATE: 15 BRPM | BODY MASS INDEX: 29.48 KG/M2 | DIASTOLIC BLOOD PRESSURE: 76 MMHG | SYSTOLIC BLOOD PRESSURE: 122 MMHG | TEMPERATURE: 97.8 F | HEART RATE: 83 BPM | HEIGHT: 70 IN | WEIGHT: 205.91 LBS | OXYGEN SATURATION: 98 %

## 2024-02-23 LAB
ANION GAP SERPL CALC-SCNC: 5 MMOL/L (ref 5–15)
BUN SERPL-MCNC: 6 MG/DL (ref 6–20)
BUN/CREAT SERPL: 6 (ref 12–20)
CALCIUM SERPL-MCNC: 7.7 MG/DL (ref 8.5–10.1)
CHLORIDE SERPL-SCNC: 110 MMOL/L (ref 97–108)
CO2 SERPL-SCNC: 23 MMOL/L (ref 21–32)
CREAT SERPL-MCNC: 0.95 MG/DL (ref 0.7–1.3)
EKG DIAGNOSIS: NORMAL
EKG Q-T INTERVAL: 438 MS
EKG QRS DURATION: 96 MS
EKG QTC CALCULATION (BAZETT): 495 MS
EKG R AXIS: 54 DEGREES
EKG T AXIS: 62 DEGREES
EKG VENTRICULAR RATE: 77 BPM
ERYTHROCYTE [DISTWIDTH] IN BLOOD BY AUTOMATED COUNT: 15.9 % (ref 11.5–14.5)
GLUCOSE BLD STRIP.AUTO-MCNC: 128 MG/DL (ref 65–117)
GLUCOSE BLD STRIP.AUTO-MCNC: 94 MG/DL (ref 65–117)
GLUCOSE SERPL-MCNC: 97 MG/DL (ref 65–100)
HCT VFR BLD AUTO: 31.6 % (ref 36.6–50.3)
HGB BLD-MCNC: 9.9 G/DL (ref 12.1–17)
MAGNESIUM SERPL-MCNC: 1.7 MG/DL (ref 1.6–2.4)
MCH RBC QN AUTO: 29 PG (ref 26–34)
MCHC RBC AUTO-ENTMCNC: 31.3 G/DL (ref 30–36.5)
MCV RBC AUTO: 92.7 FL (ref 80–99)
NRBC # BLD: 0 K/UL (ref 0–0.01)
NRBC BLD-RTO: 0 PER 100 WBC
PLATELET # BLD AUTO: 259 K/UL (ref 150–400)
PMV BLD AUTO: 9.9 FL (ref 8.9–12.9)
POTASSIUM SERPL-SCNC: 3.7 MMOL/L (ref 3.5–5.1)
RBC # BLD AUTO: 3.41 M/UL (ref 4.1–5.7)
SERVICE CMNT-IMP: ABNORMAL
SERVICE CMNT-IMP: NORMAL
SODIUM SERPL-SCNC: 138 MMOL/L (ref 136–145)
WBC # BLD AUTO: 4.4 K/UL (ref 4.1–11.1)

## 2024-02-23 PROCEDURE — 80048 BASIC METABOLIC PNL TOTAL CA: CPT

## 2024-02-23 PROCEDURE — 85027 COMPLETE CBC AUTOMATED: CPT

## 2024-02-23 PROCEDURE — 83735 ASSAY OF MAGNESIUM: CPT

## 2024-02-23 PROCEDURE — 6370000000 HC RX 637 (ALT 250 FOR IP): Performed by: INTERNAL MEDICINE

## 2024-02-23 PROCEDURE — 6360000002 HC RX W HCPCS: Performed by: INTERNAL MEDICINE

## 2024-02-23 PROCEDURE — 82962 GLUCOSE BLOOD TEST: CPT

## 2024-02-23 PROCEDURE — 36415 COLL VENOUS BLD VENIPUNCTURE: CPT

## 2024-02-23 RX ADMIN — LEVOFLOXACIN 750 MG: 750 TABLET, FILM COATED ORAL at 08:18

## 2024-02-23 RX ADMIN — ACETAMINOPHEN 1000 MG: 500 TABLET ORAL at 08:17

## 2024-02-23 RX ADMIN — ASPIRIN 81 MG: 81 TABLET, CHEWABLE ORAL at 08:17

## 2024-02-23 RX ADMIN — ATORVASTATIN CALCIUM 20 MG: 20 TABLET, FILM COATED ORAL at 08:17

## 2024-02-23 RX ADMIN — ONDANSETRON 4 MG: 2 INJECTION INTRAMUSCULAR; INTRAVENOUS at 08:46

## 2024-02-23 RX ADMIN — METRONIDAZOLE 500 MG: 250 TABLET ORAL at 08:18

## 2024-02-23 RX ADMIN — Medication: at 08:18

## 2024-02-23 RX ADMIN — METRONIDAZOLE 500 MG: 250 TABLET ORAL at 13:01

## 2024-02-23 RX ADMIN — PANTOPRAZOLE SODIUM 40 MG: 40 TABLET, DELAYED RELEASE ORAL at 06:45

## 2024-02-23 RX ADMIN — APIXABAN 10 MG: 5 TABLET, FILM COATED ORAL at 08:17

## 2024-02-23 ASSESSMENT — PAIN SCALES - GENERAL
PAINLEVEL_OUTOF10: 0
PAINLEVEL_OUTOF10: 0

## 2024-02-23 NOTE — PROGRESS NOTES
tablet  Commonly known as: ELIQUIS  Take 1 tablet by mouth 2 times daily  Start taking on: February 27, 2024           * This list has 2 medication(s) that are the same as other medications prescribed for you. Read the directions carefully, and ask your doctor or other care provider to review them with you.                CONTINUE taking these medications      acetaminophen 500 MG tablet  Commonly known as: TYLENOL  Take 2 tablets by mouth in the morning and at bedtime     aspirin 81 MG chewable tablet  Take 1 tablet by mouth daily     ciprofloxacin 500 MG tablet  Commonly known as: Cipro  Take 1 tablet by mouth 2 times daily for 10 days     metroNIDAZOLE 500 MG tablet  Commonly known as: FLAGYL  Take 1 tablet by mouth 3 times daily for 10 days     omeprazole 20 MG delayed release capsule  Commonly known as: PRILOSEC  Take 1 capsule by mouth daily     pioglitazone 15 MG tablet  Commonly known as: ACTOS  Take 2 tablets by mouth daily     simvastatin 40 MG tablet  Commonly known as: ZOCOR  Take 1 tablet by mouth nightly            STOP taking these medications      lisinopril 10 MG tablet  Commonly known as: PRINIVIL;ZESTRIL     metoprolol tartrate 25 MG tablet  Commonly known as: LOPRESSOR               Where to Get Your Medications        Information about where to get these medications is not yet available    Ask your nurse or doctor about these medications  apixaban 5 MG Tabs tablet  apixaban 5 MG Tabs tablet             DISPOSITION:    Home with Family:       Home with HH/PT/OT/RN:    SNF/LTC: x   ALEJANDRO:    OTHER:          Follow up with:   PCP : Sybil Manuel APRN - NP  No follow-up provider specified.        Total time in minutes spent coordinating this discharge (includes going over instructions, follow-up, prescriptions, and preparing report for sign off to her PCP) :  35 minutes

## 2024-02-23 NOTE — CARE COORDINATION
Transition of Care Plan:     RUR: 21%  Prior Level of Functioning: Needs assistance with: Bathing;Dressing;Toileting;Feeding;Cooking;Housework;Shopping;Mobility   Disposition: [facility] - [facility]  If SNF or IPR: Date FOC offered: N/A  Follow up appointments: PCP needed due to high RUR  DME needed: None  Transportation at discharge: TBD - CM to confirm whether Moises Coffey can assist  IM/IMM Medicare/ letter given: Will need 2nd IM after 2/22 @ 5:51 PM  Is patient a  and connected with [facility]? No              If yes, was  transfer form completed and [facility] notified? N/A  [facility]: [phone]   Discharge Caregiver contacted prior to discharge? CM to coordinate with [facility] upon discharge    12:30pm  Per MD patient will be stable tomorrow. CM received return call from Maddy,  at [facility] who reported they are unable to accept patients back on the weekend and will have to wait until Monday. CM has notified MD.       12:07pm  [facility], informed DON is not in the office yet. CM transferred to  (Raad LOMELI, message left and waiting on return call.       10:15am  CM contacted [facility] [phone] who reported that their DON will not be in until 12pm today. CM will need to call back around 12pm to discuss hospital d/c with DON prior to any further d/c planning.       SHMUEL Hernandez, CM  [phone]

## 2024-02-23 NOTE — PLAN OF CARE
Problem: Discharge Planning  Goal: Discharge to home or other facility with appropriate resources  2/22/2024 2241 by Amos Hernandez RN  Outcome: Progressing  2/22/2024 0942 by Elisa Panda RN  Outcome: Progressing  Flowsheets (Taken 2/22/2024 0800)  Discharge to home or other facility with appropriate resources:   Identify barriers to discharge with patient and caregiver   Arrange for needed discharge resources and transportation as appropriate   Identify discharge learning needs (meds, wound care, etc)     Problem: Skin/Tissue Integrity  Goal: Absence of new skin breakdown  Description: 1.  Monitor for areas of redness and/or skin breakdown  2.  Assess vascular access sites hourly  3.  Every 4-6 hours minimum:  Change oxygen saturation probe site  4.  Every 4-6 hours:  If on nasal continuous positive airway pressure, respiratory therapy assess nares and determine need for appliance change or resting period.  2/22/2024 2241 by Amos Hernandez RN  Outcome: Progressing  2/22/2024 0942 by Elisa Panda RN  Outcome: Progressing     Problem: ABCDS Injury Assessment  Goal: Absence of physical injury  Outcome: Progressing     Problem: Safety - Adult  Goal: Free from fall injury  2/22/2024 2241 by Amos Hernandez RN  Outcome: Progressing  2/22/2024 0942 by Elisa Panda RN  Outcome: Progressing     Problem: Confusion  Goal: Confusion, delirium, dementia, or psychosis is improved or at baseline  Description: INTERVENTIONS:  1. Assess for possible contributors to thought disturbance, including medications, impaired vision or hearing, underlying metabolic abnormalities, dehydration, psychiatric diagnoses, and notify attending LIP  2. [place] high risk fall precautions, as indicated  3. Provide frequent short contacts to provide reality reorientation, refocusing and direction  4. Decrease environmental stimuli, including noise as appropriate  5. Monitor and intervene to maintain adequate nutrition, hydration,  also has a wc if needed    HOME SUPPORT PRIOR TO ADMISSION: Patient resides at [facility] with assist for mobility and ADLs    Physical Therapy Goals  Initiated 2/22/2024  1.  Patient will move from supine to sit and sit to supine in bed with contact guard assist within 7 day(s).    2.  Patient will perform sit to stand with contact guard assist within 7 day(s).  3.  Patient will transfer from bed to chair and chair to bed with contact guard assist using the least restrictive device within 7 day(s).  4.  Patient will ambulate with contact guard assist for 100 feet with the least restrictive device within 7 day(s).     2/22/2024 1321 by Mitra Foley, PT  Outcome: Progressing     Problem: Occupational Therapy - Adult  Goal: By Discharge: Performs self-care activities at highest level of function for planned discharge setting.  See evaluation for individualized goals.  Description: FUNCTIONAL STATUS PRIOR TO ADMISSION:  Pt is a poor historian. He reports having a walker and wheelchair at home. Resides at [facility] where he receives assistance for ADLs.       Occupational Therapy Goals:  Initiated 2/22/2024  1.  Patient will perform grooming with Set-up in sitting within 7 day(s).  2.  Patient will perform upper body dressing with Set-up within 7 day(s).  3.  Patient will perform toilet transfers with Moderate Assist  within 7 day(s).  4.  Patient will perform all aspects of toileting with Moderate Assist within 7 day(s).  5.  Patient will participate in upper extremity therapeutic exercise/activities with Supervision for 5 minutes within 7 day(s).    2/22/2024 1332 by Maddy Altamirano, OT  Outcome: Progressing

## 2024-02-23 NOTE — PROGRESS NOTES
1900 Bedside and Verbal shift change report given to Amos RAE (oncoming nurse) by Nayely RAE (offgoing nurse). Report included the following information Nurse Handoff Report, MAR, Recent Results, and Cardiac Rhythm NSR/A.Fib .     0329 Calcium is 7.7. Dr. Judd informed(Seen @ 0414, no orders given).    0400 Right and left Pedal pulses heard (+2) using doppler ultrasound    0415 IV cannula in left antecubital area was removed and inserted G22 @ left hand.    End of Shift Note    Bedside shift change report given to Elisa RAE (oncoming nurse) by Amos Hernandez RN (offgoing nurse).  Report included the following information SBAR, MAR, Recent Results, and Cardiac Rhythm NSR/A.Fib    Shift worked:  7pm-7am     Shift summary and any significant changes:    Morning bloods collected and sent to lab      See above   Concerns for physician to address:        Zone phone for oncoming shift:           Activity:     Number times ambulated in hallways past shift: 0  Number of times OOB to chair past shift: 0    Cardiac:   Cardiac Monitoring: Yes           Access:  Current line(s): PIV     Genitourinary:   Urinary status: voiding    GI:     Current diet:  ADULT DIET; Regular; Low Fat/Low Chol/High Fiber/DARLIN  Passing flatus: YES  Tolerating current diet: YES       Pain Management:   Patient states pain is manageable on current regimen: YES    Skin:     Interventions: specialty bed, float heels, increase time out of bed, foam dressing, PT/OT consult, limit briefs, internal/external urinary devices, and nutritional support    Patient Safety:  Fall Score:    Interventions: bed/chair alarm, assistive device (walker, cane. etc), gripper socks, pt to call before getting OOB, stay with me (per policy), and gait belt       Length of Stay:  Expected LOS: 3  Actual LOS: 2      Amos Hernandez, RN

## 2024-02-23 NOTE — DISCHARGE SUMMARY
BP meds for now given borderline low BP.  Will have to re-evalute outpt and restart meds as needed.    Today patient is seen and examined, his vital signs are stable, his lab work is stable and he was cleared by all consultant parties including cardiology to be discharged for outpatient follow-up.     Diverticulitis  Patient is treated with p.o. Cipro and Flagyl as outpatient, will continue to complete 10 days total.     CKD stage IV  Creatinine at baseline     Hypertension  BP had been low, slowly improving.  Low BP could have contribute to his syncopal event.  Had been taking off ACEI and BB since admission.  Re-evaluate outpt and resume as needed.      Hyperlipidemia  Continue with statin    Diabetes mellitus  Resume home medications.      History of  whipple procedure  Mild dementia        Discharge Exam:  Patient seen and examined by me on discharge day.  Pertinent Findings:  Patient Vitals for the past 24 hrs:   BP Temp Temp src Pulse Resp SpO2   02/23/24 1109 122/76 97.8 °F (36.6 °C) Oral 83 15 98 %   02/23/24 0815 117/83 98.8 °F (37.1 °C) Oral 85 14 94 %   02/23/24 0231 (!) 141/86 98.8 °F (37.1 °C) Oral 88 16 98 %   02/22/24 2239 (!) 106/91 98.5 °F (36.9 °C) Oral 85 16 97 %   02/22/24 1930 (!) 144/49 98.4 °F (36.9 °C) Oral 83 16 98 %   02/22/24 1440 (!) 153/73 98.4 °F (36.9 °C) Oral 83 15 --       Gen:    Not in distress  Chest: Clear lungs  CVS:   Regular rhythm.  No edema  Abd:  Soft, not distended, not tender  Neuro:  awake, moving all exts    Discharge/Recent Laboratory Results:  Recent Labs     02/23/24  0138      K 3.7   *   CO2 23   BUN 6   CREATININE 0.95   GLUCOSE 97   CALCIUM 7.7*   MG 1.7     Recent Labs     02/23/24  0138   HGB 9.9*   HCT 31.6*   WBC 4.4          Discharge Medications:     Medication List        START taking these medications      * apixaban 5 MG Tabs tablet  Commonly known as: ELIQUIS  Take 2 tablets by mouth 2 times daily for 5 doses     * apixaban 5 MG Tabs

## 2024-02-23 NOTE — PROGRESS NOTES
1507 Patient to discharge to facility, awaiting transport.  Will send discharge paperwork with transport as patient is AxOx1/2.  Informed by CM that I didn't need to call report to the facility.

## 2024-02-24 NOTE — PROGRESS NOTES
Progress Note      2/23/2024 8:47 PM  NAME: Karson Rodriguez   MRN:  [id number]   Admit Diagnosis: Syncope and collapse [R55]                    IP Cardiology Consult        Date of consult:  02/21/24  Date of admission: 2/20/2024  Primary Cardiologist: LAURIE  Physician Requesting consult: Dr Snyder        Pt seen this am      Assessment:     Problem list:   Possible syncopal episode, with low blood pressure  Pulmonary embolism by CTA   Hypotension on presentation  Recent diverticulitis, on antibiotic  Paroxysmal atrial fibrillation, not on anticoagulation likely due to fall risk and low atrial fibrillation burden  CKD   Hypertension  Hyperlipidemia  Diabetes  Dementia     Syncope, low BP      Echo 2/20- EF 65-70%. Normal RV    CTA - PE         Recommendations:     Started on apixaban for PE   Continue to hold lisinopril and metoprolol  Continue statin  Since syncope is in the setting of PE, no need for event monitor     No further IP cardiac eval            Subjective:     HPI:   Hard of hearing   No CP or SOB      ROS: No CP, SOB, Abd pain, nausea, vomiting, syncope, palpitations, new focal neurological symptoms     Objective:      Physical Exam:    Last 24hrs VS reviewed since prior progress note. Most recent are:    /76   Pulse 83   Temp 97.8 °F (36.6 °C) (Oral)   Resp 15   Ht 1.778 m (5' 10\")   Wt 93.4 kg (205 lb 14.6 oz)   SpO2 98%   BMI 29.54 kg/m²   No intake or output data in the 24 hours ending 02/23/24 2047        General  no acute distress   Neck: Supple   Respiratory: No respiratory distress, clear lung sound   Cardiovascular: Regular rate rhythm, S1S2, no murmur   Abdomen: soft, non tender, non distended   Neuro: moves all extremities, oriented x3   Skin: warm and dry   Extremity: no edema, warm to touch        Data Review    Telemetry: normal sinus rhythm          Lab Data Personally Reviewed:    Recent Labs     02/21/24  0059 02/23/24  0138   WBC 4.5 4.4   HGB 10.1* 9.9*   HCT 32.1*  31.6*    259     No results for input(s): \"INR\", \"APTT\" in the last 72 hours.    Invalid input(s): \"PTP\"   Recent Labs     02/21/24  0059 02/23/24  0138    138   K 3.3* 3.7   * 110*   CO2 22 23   BUN 11 6   MG 1.5* 1.7     No results for input(s): \"CPK\" in the last 72 hours.    Invalid input(s): \"CPKMB\", \"CKNDX\", \"TROIQ\"  No results found for: \"CHOL\", \"CHOLX\", \"CHLST\", \"CHOLV\", \"HDL\", \"HDLC\", \"LDL\", \"LDLC\", \"TGLX\", \"TRIGL\"    No results for input(s): \"TP\", \"ALB\", \"GLOB\", \"GGT\", \"AML\" in the last 72 hours.    Invalid input(s): \"SGOT\", \"GPT\", \"AP\", \"TBIL\", \"AMYP\", \"LPSE\", \"HLPSE\"    No results for input(s): \"PH\", \"PCO2\", \"PO2\" in the last 72 hours.    Medications Personally Reviewed:    No current facility-administered medications for this encounter.     Current Outpatient Medications   Medication Sig    apixaban (ELIQUIS) 5 MG TABS tablet Take 2 tablets by mouth 2 times daily for 5 doses    [START ON 2/27/2024] apixaban (ELIQUIS) 5 MG TABS tablet Take 1 tablet by mouth 2 times daily    ciprofloxacin (CIPRO) 500 MG tablet Take 1 tablet by mouth 2 times daily for 10 days    metroNIDAZOLE (FLAGYL) 500 MG tablet Take 1 tablet by mouth 3 times daily for 10 days    aspirin 81 MG chewable tablet Take 1 tablet by mouth daily    pioglitazone (ACTOS) 15 MG tablet Take 2 tablets by mouth daily    simvastatin (ZOCOR) 40 MG tablet Take 1 tablet by mouth nightly    acetaminophen (TYLENOL) 500 MG tablet Take 2 tablets by mouth in the morning and at bedtime    omeprazole (PRILOSEC) 20 MG delayed release capsule Take 1 capsule by mouth daily              Mago Mendoza MD

## 2024-02-25 ENCOUNTER — APPOINTMENT (OUTPATIENT)
Facility: HOSPITAL | Age: 84
End: 2024-02-25
Payer: MEDICARE

## 2024-02-25 ENCOUNTER — HOSPITAL ENCOUNTER (INPATIENT)
Facility: HOSPITAL | Age: 84
LOS: 5 days | Discharge: NURSING, REHAB OR LONG-TERM CARE | End: 2024-03-01
Attending: STUDENT IN AN ORGANIZED HEALTH CARE EDUCATION/TRAINING PROGRAM | Admitting: STUDENT IN AN ORGANIZED HEALTH CARE EDUCATION/TRAINING PROGRAM
Payer: MEDICARE

## 2024-02-25 DIAGNOSIS — J90 PLEURAL EFFUSION: ICD-10-CM

## 2024-02-25 DIAGNOSIS — J18.9 COMMUNITY ACQUIRED PNEUMONIA, UNSPECIFIED LATERALITY: Primary | ICD-10-CM

## 2024-02-25 DIAGNOSIS — R55 SYNCOPE AND COLLAPSE: ICD-10-CM

## 2024-02-25 DIAGNOSIS — R55 SYNCOPE, UNSPECIFIED SYNCOPE TYPE: ICD-10-CM

## 2024-02-25 PROBLEM — E86.0 DEHYDRATION: Status: ACTIVE | Noted: 2024-02-25

## 2024-02-25 LAB
ALBUMIN SERPL-MCNC: 2.5 G/DL (ref 3.5–5)
ALBUMIN/GLOB SERPL: 0.7 (ref 1.1–2.2)
ALP SERPL-CCNC: 88 U/L (ref 45–117)
ALT SERPL-CCNC: 14 U/L (ref 12–78)
ANION GAP BLD CALC-SCNC: 11 (ref 10–20)
ANION GAP SERPL CALC-SCNC: 7 MMOL/L (ref 5–15)
APPEARANCE UR: CLEAR
AST SERPL-CCNC: 31 U/L (ref 15–37)
BACTERIA SPEC CULT: NORMAL
BACTERIA SPEC CULT: NORMAL
BACTERIA URNS QL MICRO: NEGATIVE /HPF
BASE DEFICIT BLD-SCNC: 3.4 MMOL/L
BASOPHILS # BLD: 0.1 K/UL (ref 0–0.1)
BASOPHILS NFR BLD: 1 % (ref 0–1)
BILIRUB SERPL-MCNC: 0.3 MG/DL (ref 0.2–1)
BILIRUB UR QL: NEGATIVE
BUN SERPL-MCNC: 6 MG/DL (ref 6–20)
BUN/CREAT SERPL: 6 (ref 12–20)
CA-I BLD-MCNC: 1.11 MMOL/L (ref 1.12–1.32)
CALCIUM SERPL-MCNC: 7.9 MG/DL (ref 8.5–10.1)
CHLORIDE BLD-SCNC: 102 MMOL/L (ref 100–108)
CHLORIDE SERPL-SCNC: 106 MMOL/L (ref 97–108)
CO2 BLD-SCNC: 21 MMOL/L (ref 19–24)
CO2 SERPL-SCNC: 23 MMOL/L (ref 21–32)
COLOR UR: ABNORMAL
COMMENT:: NORMAL
CREAT SERPL-MCNC: 1.03 MG/DL (ref 0.7–1.3)
CREAT UR-MCNC: 0.7 MG/DL (ref 0.6–1.3)
DIFFERENTIAL METHOD BLD: ABNORMAL
EOSINOPHIL # BLD: 0.3 K/UL (ref 0–0.4)
EOSINOPHIL NFR BLD: 5 % (ref 0–7)
EPITH CASTS URNS QL MICRO: ABNORMAL /LPF
ERYTHROCYTE [DISTWIDTH] IN BLOOD BY AUTOMATED COUNT: 16.4 % (ref 11.5–14.5)
FLUAV AG NPH QL IA: NEGATIVE
FLUBV AG NOSE QL IA: NEGATIVE
GLOBULIN SER CALC-MCNC: 3.5 G/DL (ref 2–4)
GLUCOSE BLD STRIP.AUTO-MCNC: 110 MG/DL (ref 65–117)
GLUCOSE BLD STRIP.AUTO-MCNC: 83 MG/DL (ref 74–106)
GLUCOSE SERPL-MCNC: 89 MG/DL (ref 65–100)
GLUCOSE UR STRIP.AUTO-MCNC: NEGATIVE MG/DL
HCO3 BLDA-SCNC: 22 MMOL/L
HCT VFR BLD AUTO: 37.6 % (ref 36.6–50.3)
HGB BLD-MCNC: 11.8 G/DL (ref 12.1–17)
HGB UR QL STRIP: NEGATIVE
HYALINE CASTS URNS QL MICRO: ABNORMAL /LPF (ref 0–2)
IMM GRANULOCYTES # BLD AUTO: 0 K/UL (ref 0–0.04)
IMM GRANULOCYTES NFR BLD AUTO: 1 % (ref 0–0.5)
KETONES UR QL STRIP.AUTO: ABNORMAL MG/DL
LACTATE BLD-SCNC: 0.96 MMOL/L (ref 0.4–2)
LACTATE SERPL-SCNC: 1.8 MMOL/L (ref 0.4–2)
LEUKOCYTE ESTERASE UR QL STRIP.AUTO: NEGATIVE
LYMPHOCYTES # BLD: 1.8 K/UL (ref 0.8–3.5)
LYMPHOCYTES NFR BLD: 37 % (ref 12–49)
MCH RBC QN AUTO: 30.2 PG (ref 26–34)
MCHC RBC AUTO-ENTMCNC: 31.4 G/DL (ref 30–36.5)
MCV RBC AUTO: 96.2 FL (ref 80–99)
MONOCYTES # BLD: 0.6 K/UL (ref 0–1)
MONOCYTES NFR BLD: 13 % (ref 5–13)
NEUTS SEG # BLD: 2.2 K/UL (ref 1.8–8)
NEUTS SEG NFR BLD: 43 % (ref 32–75)
NITRITE UR QL STRIP.AUTO: NEGATIVE
NRBC # BLD: 0 K/UL (ref 0–0.01)
NRBC BLD-RTO: 0 PER 100 WBC
NT PRO BNP: 1724 PG/ML
PCO2 BLDV: 37.6 MMHG (ref 41–51)
PH BLDV: 7.37 (ref 7.32–7.42)
PH UR STRIP: 6 (ref 5–8)
PLATELET # BLD AUTO: 269 K/UL (ref 150–400)
PMV BLD AUTO: 10.2 FL (ref 8.9–12.9)
PO2 BLDV: 30 MMHG (ref 25–40)
POTASSIUM BLD-SCNC: 4.9 MMOL/L (ref 3.5–5.5)
POTASSIUM SERPL-SCNC: 3.4 MMOL/L (ref 3.5–5.1)
PROCALCITONIN SERPL-MCNC: <0.05 NG/ML
PROT SERPL-MCNC: 6 G/DL (ref 6.4–8.2)
PROT UR STRIP-MCNC: NEGATIVE MG/DL
RBC # BLD AUTO: 3.91 M/UL (ref 4.1–5.7)
RBC #/AREA URNS HPF: ABNORMAL /HPF (ref 0–5)
SAO2 % BLD: 55 %
SARS-COV-2 RDRP RESP QL NAA+PROBE: NOT DETECTED
SERVICE CMNT-IMP: NORMAL
SODIUM BLD-SCNC: 134 MMOL/L (ref 136–145)
SODIUM SERPL-SCNC: 136 MMOL/L (ref 136–145)
SOURCE: NORMAL
SP GR UR REFRACTOMETRY: 1.01
SPECIMEN HOLD: NORMAL
SPECIMEN SITE: ABNORMAL
TROPONIN I SERPL HS-MCNC: 9 NG/L (ref 0–76)
URINE CULTURE IF INDICATED: ABNORMAL
UROBILINOGEN UR QL STRIP.AUTO: 0.2 EU/DL (ref 0.2–1)
WBC # BLD AUTO: 5 K/UL (ref 4.1–11.1)
WBC URNS QL MICRO: ABNORMAL /HPF (ref 0–4)

## 2024-02-25 PROCEDURE — 1100000000 HC RM PRIVATE

## 2024-02-25 PROCEDURE — 82962 GLUCOSE BLOOD TEST: CPT

## 2024-02-25 PROCEDURE — 93005 ELECTROCARDIOGRAM TRACING: CPT | Performed by: STUDENT IN AN ORGANIZED HEALTH CARE EDUCATION/TRAINING PROGRAM

## 2024-02-25 PROCEDURE — 82947 ASSAY GLUCOSE BLOOD QUANT: CPT

## 2024-02-25 PROCEDURE — 2580000003 HC RX 258: Performed by: STUDENT IN AN ORGANIZED HEALTH CARE EDUCATION/TRAINING PROGRAM

## 2024-02-25 PROCEDURE — 99285 EMERGENCY DEPT VISIT HI MDM: CPT

## 2024-02-25 PROCEDURE — 82803 BLOOD GASES ANY COMBINATION: CPT

## 2024-02-25 PROCEDURE — 85025 COMPLETE CBC W/AUTO DIFF WBC: CPT

## 2024-02-25 PROCEDURE — 87635 SARS-COV-2 COVID-19 AMP PRB: CPT

## 2024-02-25 PROCEDURE — 70450 CT HEAD/BRAIN W/O DYE: CPT

## 2024-02-25 PROCEDURE — 80053 COMPREHEN METABOLIC PANEL: CPT

## 2024-02-25 PROCEDURE — 87040 BLOOD CULTURE FOR BACTERIA: CPT

## 2024-02-25 PROCEDURE — 82330 ASSAY OF CALCIUM: CPT

## 2024-02-25 PROCEDURE — 84132 ASSAY OF SERUM POTASSIUM: CPT

## 2024-02-25 PROCEDURE — 83605 ASSAY OF LACTIC ACID: CPT

## 2024-02-25 PROCEDURE — 83880 ASSAY OF NATRIURETIC PEPTIDE: CPT

## 2024-02-25 PROCEDURE — 6360000004 HC RX CONTRAST MEDICATION: Performed by: STUDENT IN AN ORGANIZED HEALTH CARE EDUCATION/TRAINING PROGRAM

## 2024-02-25 PROCEDURE — 6370000000 HC RX 637 (ALT 250 FOR IP): Performed by: STUDENT IN AN ORGANIZED HEALTH CARE EDUCATION/TRAINING PROGRAM

## 2024-02-25 PROCEDURE — 71045 X-RAY EXAM CHEST 1 VIEW: CPT

## 2024-02-25 PROCEDURE — 84145 PROCALCITONIN (PCT): CPT

## 2024-02-25 PROCEDURE — 96360 HYDRATION IV INFUSION INIT: CPT

## 2024-02-25 PROCEDURE — 84484 ASSAY OF TROPONIN QUANT: CPT

## 2024-02-25 PROCEDURE — 36415 COLL VENOUS BLD VENIPUNCTURE: CPT

## 2024-02-25 PROCEDURE — 6360000002 HC RX W HCPCS: Performed by: STUDENT IN AN ORGANIZED HEALTH CARE EDUCATION/TRAINING PROGRAM

## 2024-02-25 PROCEDURE — 84295 ASSAY OF SERUM SODIUM: CPT

## 2024-02-25 PROCEDURE — 74177 CT ABD & PELVIS W/CONTRAST: CPT

## 2024-02-25 PROCEDURE — 81001 URINALYSIS AUTO W/SCOPE: CPT

## 2024-02-25 PROCEDURE — 71275 CT ANGIOGRAPHY CHEST: CPT

## 2024-02-25 PROCEDURE — 87804 INFLUENZA ASSAY W/OPTIC: CPT

## 2024-02-25 RX ORDER — SODIUM CHLORIDE 9 MG/ML
INJECTION, SOLUTION INTRAVENOUS PRN
Status: DISCONTINUED | OUTPATIENT
Start: 2024-02-25 | End: 2024-03-01 | Stop reason: HOSPADM

## 2024-02-25 RX ORDER — ATORVASTATIN CALCIUM 20 MG/1
20 TABLET, FILM COATED ORAL NIGHTLY
Status: DISCONTINUED | OUTPATIENT
Start: 2024-02-25 | End: 2024-03-01 | Stop reason: HOSPADM

## 2024-02-25 RX ORDER — ASPIRIN 81 MG/1
81 TABLET, CHEWABLE ORAL DAILY
Status: DISCONTINUED | OUTPATIENT
Start: 2024-02-25 | End: 2024-03-01 | Stop reason: HOSPADM

## 2024-02-25 RX ORDER — SODIUM CHLORIDE 9 MG/ML
INJECTION, SOLUTION INTRAVENOUS CONTINUOUS
Status: DISCONTINUED | OUTPATIENT
Start: 2024-02-25 | End: 2024-02-29

## 2024-02-25 RX ORDER — 0.9 % SODIUM CHLORIDE 0.9 %
1000 INTRAVENOUS SOLUTION INTRAVENOUS ONCE
Status: DISCONTINUED | OUTPATIENT
Start: 2024-02-25 | End: 2024-02-25

## 2024-02-25 RX ORDER — AZITHROMYCIN 250 MG/1
250 TABLET, FILM COATED ORAL DAILY
Status: COMPLETED | OUTPATIENT
Start: 2024-02-26 | End: 2024-02-29

## 2024-02-25 RX ORDER — AZITHROMYCIN 250 MG/1
500 TABLET, FILM COATED ORAL
Status: COMPLETED | OUTPATIENT
Start: 2024-02-25 | End: 2024-02-25

## 2024-02-25 RX ORDER — 0.9 % SODIUM CHLORIDE 0.9 %
30 INTRAVENOUS SOLUTION INTRAVENOUS ONCE
Status: COMPLETED | OUTPATIENT
Start: 2024-02-25 | End: 2024-02-25

## 2024-02-25 RX ORDER — POTASSIUM CHLORIDE 20 MEQ/1
40 TABLET, EXTENDED RELEASE ORAL PRN
Status: DISCONTINUED | OUTPATIENT
Start: 2024-02-25 | End: 2024-02-28

## 2024-02-25 RX ORDER — INSULIN LISPRO 100 [IU]/ML
0-8 INJECTION, SOLUTION INTRAVENOUS; SUBCUTANEOUS
Status: DISCONTINUED | OUTPATIENT
Start: 2024-02-25 | End: 2024-03-01 | Stop reason: HOSPADM

## 2024-02-25 RX ORDER — ONDANSETRON 4 MG/1
4 TABLET, ORALLY DISINTEGRATING ORAL EVERY 8 HOURS PRN
Status: DISCONTINUED | OUTPATIENT
Start: 2024-02-25 | End: 2024-03-01 | Stop reason: HOSPADM

## 2024-02-25 RX ORDER — POLYETHYLENE GLYCOL 3350 17 G/17G
17 POWDER, FOR SOLUTION ORAL DAILY PRN
Status: DISCONTINUED | OUTPATIENT
Start: 2024-02-25 | End: 2024-03-01 | Stop reason: HOSPADM

## 2024-02-25 RX ORDER — ONDANSETRON 2 MG/ML
4 INJECTION INTRAMUSCULAR; INTRAVENOUS EVERY 6 HOURS PRN
Status: DISCONTINUED | OUTPATIENT
Start: 2024-02-25 | End: 2024-03-01 | Stop reason: HOSPADM

## 2024-02-25 RX ORDER — SODIUM CHLORIDE 0.9 % (FLUSH) 0.9 %
5-40 SYRINGE (ML) INJECTION EVERY 12 HOURS SCHEDULED
Status: DISCONTINUED | OUTPATIENT
Start: 2024-02-25 | End: 2024-03-01 | Stop reason: HOSPADM

## 2024-02-25 RX ORDER — DEXTROSE MONOHYDRATE 100 MG/ML
INJECTION, SOLUTION INTRAVENOUS CONTINUOUS PRN
Status: DISCONTINUED | OUTPATIENT
Start: 2024-02-25 | End: 2024-03-01 | Stop reason: HOSPADM

## 2024-02-25 RX ORDER — SODIUM CHLORIDE 0.9 % (FLUSH) 0.9 %
5-40 SYRINGE (ML) INJECTION PRN
Status: DISCONTINUED | OUTPATIENT
Start: 2024-02-25 | End: 2024-03-01 | Stop reason: HOSPADM

## 2024-02-25 RX ORDER — ACETAMINOPHEN 650 MG/1
650 SUPPOSITORY RECTAL EVERY 6 HOURS PRN
Status: DISCONTINUED | OUTPATIENT
Start: 2024-02-25 | End: 2024-03-01 | Stop reason: HOSPADM

## 2024-02-25 RX ORDER — ENOXAPARIN SODIUM 100 MG/ML
40 INJECTION SUBCUTANEOUS DAILY
Status: DISCONTINUED | OUTPATIENT
Start: 2024-02-25 | End: 2024-02-25

## 2024-02-25 RX ORDER — POTASSIUM CHLORIDE 7.45 MG/ML
10 INJECTION INTRAVENOUS PRN
Status: DISCONTINUED | OUTPATIENT
Start: 2024-02-25 | End: 2024-02-28

## 2024-02-25 RX ORDER — INSULIN LISPRO 100 [IU]/ML
0-4 INJECTION, SOLUTION INTRAVENOUS; SUBCUTANEOUS NIGHTLY
Status: DISCONTINUED | OUTPATIENT
Start: 2024-02-25 | End: 2024-03-01 | Stop reason: HOSPADM

## 2024-02-25 RX ORDER — ACETAMINOPHEN 325 MG/1
650 TABLET ORAL EVERY 6 HOURS PRN
Status: DISCONTINUED | OUTPATIENT
Start: 2024-02-25 | End: 2024-03-01 | Stop reason: HOSPADM

## 2024-02-25 RX ORDER — PANTOPRAZOLE SODIUM 40 MG/1
40 TABLET, DELAYED RELEASE ORAL DAILY
Status: DISCONTINUED | OUTPATIENT
Start: 2024-02-25 | End: 2024-03-01 | Stop reason: HOSPADM

## 2024-02-25 RX ORDER — GLUCAGON 1 MG/ML
1 KIT INJECTION PRN
Status: DISCONTINUED | OUTPATIENT
Start: 2024-02-25 | End: 2024-03-01 | Stop reason: HOSPADM

## 2024-02-25 RX ORDER — MAGNESIUM SULFATE IN WATER 40 MG/ML
2000 INJECTION, SOLUTION INTRAVENOUS PRN
Status: DISCONTINUED | OUTPATIENT
Start: 2024-02-25 | End: 2024-03-01 | Stop reason: HOSPADM

## 2024-02-25 RX ORDER — POTASSIUM CHLORIDE 1.5 G/1.58G
40 POWDER, FOR SOLUTION ORAL PRN
Status: DISCONTINUED | OUTPATIENT
Start: 2024-02-25 | End: 2024-02-28

## 2024-02-25 RX ADMIN — SODIUM CHLORIDE: 9 INJECTION, SOLUTION INTRAVENOUS at 19:07

## 2024-02-25 RX ADMIN — ACETAMINOPHEN 650 MG: 325 TABLET ORAL at 21:13

## 2024-02-25 RX ADMIN — IOPAMIDOL 100 ML: 755 INJECTION, SOLUTION INTRAVENOUS at 12:54

## 2024-02-25 RX ADMIN — APIXABAN 10 MG: 5 TABLET, FILM COATED ORAL at 20:10

## 2024-02-25 RX ADMIN — ASPIRIN 81 MG: 81 TABLET, CHEWABLE ORAL at 19:04

## 2024-02-25 RX ADMIN — SODIUM CHLORIDE 1000 MG: 900 INJECTION INTRAVENOUS at 20:13

## 2024-02-25 RX ADMIN — SODIUM CHLORIDE, PRESERVATIVE FREE 10 ML: 5 INJECTION INTRAVENOUS at 20:11

## 2024-02-25 RX ADMIN — SODIUM CHLORIDE 2802 ML: 9 INJECTION, SOLUTION INTRAVENOUS at 12:02

## 2024-02-25 RX ADMIN — ATORVASTATIN CALCIUM 20 MG: 20 TABLET, FILM COATED ORAL at 20:10

## 2024-02-25 RX ADMIN — AZITHROMYCIN 500 MG: 250 TABLET, FILM COATED ORAL at 19:04

## 2024-02-25 ASSESSMENT — PAIN - FUNCTIONAL ASSESSMENT: PAIN_FUNCTIONAL_ASSESSMENT: PREVENTS OR INTERFERES SOME ACTIVE ACTIVITIES AND ADLS

## 2024-02-25 ASSESSMENT — PAIN DESCRIPTION - LOCATION
LOCATION: GROIN
LOCATION: BACK

## 2024-02-25 ASSESSMENT — PAIN DESCRIPTION - ORIENTATION: ORIENTATION: LOWER

## 2024-02-25 ASSESSMENT — PAIN DESCRIPTION - DESCRIPTORS: DESCRIPTORS: ACHING

## 2024-02-25 ASSESSMENT — PAIN SCALES - GENERAL
PAINLEVEL_OUTOF10: 6
PAINLEVEL_OUTOF10: 7

## 2024-02-25 ASSESSMENT — PAIN DESCRIPTION - PAIN TYPE: TYPE: ACUTE PAIN

## 2024-02-25 NOTE — ED TRIAGE NOTES
Pt is BIBA  from [facility] hypotension/ams-- Pt was recently discharged from this facility 2 days ago for same issue.   Pt has dementia at baseline--on arrival is alert to person, place--confused to situation, events.     Pt was hypotensive for EMS--reports BP was 80/50s-- EMS gave approx 800cc of 0.9 NS% IV en route.

## 2024-02-25 NOTE — PROGRESS NOTES
Advised RN there is no hospital transport to bring pt.  RN stated she will see if night shift can bring pt as she is still trying to complete other orders on this patient.  Gave her CT phone # to follow up.

## 2024-02-25 NOTE — H&P
hypokalemia  Supplement potassium    Sigmoid diverticulosis  Status post Whipple's procedure  Recent history of diverticulitis  Current CT scan does not show any diverticulitis.  Will hold off on antibiotics for diverticulitis.    Chronic history of diabetes, hypertension, hyperlipidemia mild dementia  Patient was taken off ACE inhibitors and beta-blocker in the last admission-need to hold off for now  Patient appears to be on pioglitazone for diabetes -will hold off oral medication and place the patient on insulin sliding scale    Medical Decision Making:   I personally reviewed labs: CBC BMP  I personally reviewed imaging: CT- no increased clot burden  I personally reviewed EKG:   Toxic drug monitoring: Bleeding while on Eliquis  Discussed case with: ED provider. After discussion I am in agreement that acuity of patient's medical condition necessitates hospital stay.      Code Status: Full  DVT Prophylaxis: Eliquis  Baseline: Independent now wheelchair-bound for mobility since last admission    Subjective:   CHIEF COMPLAINT: Possible syncope    HISTORY OF PRESENT ILLNESS:     Karson Rodriguez is a 83 y.o.  male with PMHx significant as below was brought to the ED for evaluation after the past patient was found to have passed out in his wheelchair.  Per his son patient was apparently all right till yesterday.  This a.m. patient was on his wheelchair , when he was found to be passed out-9 11 was called after that.  During the evaluation by EMS his blood pressure was found to be very low around 80 /60.  He needed IV fluid bolus.  On evaluation with the patient patient does not remember why he is brought to the hospital at this moment.  He however does not complain of pain at  any site.  He does not complain of difficulty breathing.  No complaints of burning with urination.    Review of systems-difficult to obtain-patient has baseline dementia.    In the ED, patient received ceftriaxone and azithromycin.  He also received  abdominal aortic aneurysm. REPRODUCTIVE ORGANS: Prostatomegaly. URINARY BLADDER: No mass or calculus. BONES: Healing lateral right 6-7 rib fractures. Bilateral femoral head avascular necrosis, without definite subchondral collapse. Degenerative changes.. ABDOMINAL WALL: No mass or hernia. ADDITIONAL COMMENTS: N/A     1.  Findings suggestive of mild acute descending colonic diverticulitis. 2.  Healing right 6-7 rib fractures. 3.  Small right pleural effusion. 4.  Incidental findings as above.     XR RIBS RIGHT INCLUDE CHEST (MIN 3 VIEWS)    Result Date: 2/15/2024  EXAM: XR RIBS RIGHT INCLUDE CHEST (MIN 3 VIEWS) INDICATION: chest pain COMPARISON: Chest radiograph 12/14/2023. TECHNIQUE: Chest, one view; right RIBS, 3 views FINDINGS: Cardiac mediastinal silhouette is upper limits of normal in size. Lungs and pleural spaces are grossly clear. Suspected lateral right 5th-7th rib fractures.     Suspected lateral right 5th-7th rib fractures.      _______________________________________________________________________    TOTAL TIME:  76 Minutes    Critical Care Provided     Minutes non procedure based    Signed: Marcy Ng MD    Procedures: see electronic medical records for all procedures/Xrays and details which were not copied into this note but were reviewed prior to creation of Plan.

## 2024-02-25 NOTE — ED PROVIDER NOTES
[facility] 1 NEUROSCIENCE TELEMETRY  EMERGENCY DEPARTMENT ENCOUNTER       Pt Name: Karson Rodriguez  MRN: [id number]  Birthdate 1940  Date of evaluation: 2/25/2024  Provider: Santos Acuña MD   PCP: Sybil Manuel APRN - NP  Note Started: 6:22 PM EST 2/25/24     CHIEF COMPLAINT       Chief Complaint   Patient presents with    Hypotension     HISTORY OF PRESENT ILLNESS: 1 or more elements      History From: EMS and SNF records, History limited by: Ams/dementia     Karson Rodriguez is a 83 y.o. male who presents to the emergency department from skilled nursing facility for hypotension.  He was found to have systolic blood pressures in the 70s.  He recently was admitted to hospital for syncope and was found to have bilateral pulmonary emboli.  He was started on blood thinners.  Is unclear per his SNF paperwork if these blood Thinners have been continued although he thinks that they have.  Patient has not had any fever, cough.  He does report some mild LLQ pain.       Please See MDM for Additional Details of the HPI/PMH  Nursing Notes were all reviewed and agreed with or any disagreements were addressed in the HPI.     REVIEW OF SYSTEMS        Positives and Pertinent negatives as per HPI.    PAST HISTORY     Past Medical History:  Past Medical History:   Diagnosis Date    Aneurysm (HCC)     aortic    Arrhythmia     A-fib    Calculus of kidney     Cancer (HCC)     Chronic kidney disease     stone    Diabetes (HCC)     Fast heart beat     Former tobacco use     Hypertension     Muscle weakness     Prostate disorder     Stomach pain        Past Surgical History:  Past Surgical History:   Procedure Laterality Date    ORTHOPEDIC SURGERY      neck surgery    OTHER SURGICAL HISTORY  1/6/2015    Whipple procedure, G tube, J tube for duodenal cancer    FL UNLISTED PROCEDURE ABDOMEN PERITONEUM & OMENTUM      Whipple Procedure    UPPER GI ENDOSCOPY,BALL DIL,30MM  12/2/2014         UPPER GI ENDOSCOPY,BIOPSY  12/2/2014         UPPER  IV ceftriaxone and p.o. azithromycin.  Patient was admitted to hospital medicine service for further management    Amount and/or Complexity of Data Reviewed  Labs: ordered. Decision-making details documented in ED Course.  Radiology: ordered and independent interpretation performed. Decision-making details documented in ED Course.  ECG/medicine tests: ordered and independent interpretation performed. Decision-making details documented in ED Course.    Risk  Prescription drug management.  Decision regarding hospitalization.      ED Course as of 02/25/24 1822   Sun Feb 25, 2024   1122 Recent discharge two days ago.  He had AMS at [facility] and was found to have systolic BP in 80s.   [WB]   1128 EKG is interpreted by me with sinus rhythm, heart rate 70, normal axis, prolonged Qtc to 518, QRS narrow [WB]   1131 No longer hypertensive.  Do not think TNK needs to be given empirically.  Will check CTA for progression of embolic disease as I do not see that he has been on anticoagluation at his SNF. [WB]   1153 Spoke with Dr. Christensen with IR who agreed with plan regarding obtaining CTA to evaluate worsened clots[WB]   1504 No worsened clot burden on CTA.  Unsure of reason of hypotension.  Will treat for possible community acquired pneumonia with evidence of airspace disease on my interpretation of CT imaging.  Discussed ED workup, diagnosis, and management with Dr. Ng who [facility] [WB]      ED Course User Index  [WB] Santos Acuña MD       FINAL IMPRESSION     1. Community acquired pneumonia, unspecified laterality    2. Pleural effusion          DISPOSITION/PLAN   Karson Rodriguez's  results have been reviewed with him.  He has been counseled regarding his diagnosis, treatment, and plan.  He verbally conveys understanding and agreement of the signs, symptoms, diagnosis, treatment and prognosis and additionally agrees to follow up as discussed.  He also agrees

## 2024-02-25 NOTE — PROGRESS NOTES
Pt was transported to floor before CT was performed. Called unit @ #[id number] to have RN bring pt to CT and no answer. Will attempt to call back.

## 2024-02-26 LAB
BACTERIA SPEC CULT: NORMAL
BACTERIA SPEC CULT: NORMAL
EKG ATRIAL RATE: 70 BPM
EKG DIAGNOSIS: NORMAL
EKG Q-T INTERVAL: 480 MS
EKG QRS DURATION: 80 MS
EKG QTC CALCULATION (BAZETT): 518 MS
EKG R AXIS: 44 DEGREES
EKG T AXIS: 66 DEGREES
EKG VENTRICULAR RATE: 70 BPM
GLUCOSE BLD STRIP.AUTO-MCNC: 107 MG/DL (ref 65–117)
GLUCOSE BLD STRIP.AUTO-MCNC: 231 MG/DL (ref 65–117)
SERVICE CMNT-IMP: ABNORMAL
SERVICE CMNT-IMP: NORMAL

## 2024-02-26 PROCEDURE — 1100000000 HC RM PRIVATE

## 2024-02-26 PROCEDURE — 82962 GLUCOSE BLOOD TEST: CPT

## 2024-02-26 PROCEDURE — 6370000000 HC RX 637 (ALT 250 FOR IP): Performed by: STUDENT IN AN ORGANIZED HEALTH CARE EDUCATION/TRAINING PROGRAM

## 2024-02-26 PROCEDURE — 99222 1ST HOSP IP/OBS MODERATE 55: CPT | Performed by: INTERNAL MEDICINE

## 2024-02-26 PROCEDURE — 2580000003 HC RX 258: Performed by: STUDENT IN AN ORGANIZED HEALTH CARE EDUCATION/TRAINING PROGRAM

## 2024-02-26 PROCEDURE — 6360000002 HC RX W HCPCS: Performed by: STUDENT IN AN ORGANIZED HEALTH CARE EDUCATION/TRAINING PROGRAM

## 2024-02-26 RX ADMIN — AZITHROMYCIN 250 MG: 250 TABLET, FILM COATED ORAL at 09:43

## 2024-02-26 RX ADMIN — PANTOPRAZOLE SODIUM 40 MG: 40 TABLET, DELAYED RELEASE ORAL at 09:43

## 2024-02-26 RX ADMIN — APIXABAN 10 MG: 5 TABLET, FILM COATED ORAL at 09:44

## 2024-02-26 RX ADMIN — ATORVASTATIN CALCIUM 20 MG: 20 TABLET, FILM COATED ORAL at 20:15

## 2024-02-26 RX ADMIN — SODIUM CHLORIDE: 9 INJECTION, SOLUTION INTRAVENOUS at 19:03

## 2024-02-26 RX ADMIN — ACETAMINOPHEN 650 MG: 325 TABLET ORAL at 12:38

## 2024-02-26 RX ADMIN — ASPIRIN 81 MG: 81 TABLET, CHEWABLE ORAL at 09:45

## 2024-02-26 RX ADMIN — SODIUM CHLORIDE, PRESERVATIVE FREE 10 ML: 5 INJECTION INTRAVENOUS at 09:45

## 2024-02-26 RX ADMIN — SODIUM CHLORIDE, PRESERVATIVE FREE 10 ML: 5 INJECTION INTRAVENOUS at 20:15

## 2024-02-26 RX ADMIN — SODIUM CHLORIDE 1000 MG: 900 INJECTION INTRAVENOUS at 16:36

## 2024-02-26 RX ADMIN — INSULIN LISPRO 2 UNITS: 100 INJECTION, SOLUTION INTRAVENOUS; SUBCUTANEOUS at 11:21

## 2024-02-26 RX ADMIN — APIXABAN 10 MG: 5 TABLET, FILM COATED ORAL at 21:00

## 2024-02-26 ASSESSMENT — PAIN SCALES - GENERAL: PAINLEVEL_OUTOF10: 8

## 2024-02-26 ASSESSMENT — PAIN DESCRIPTION - LOCATION: LOCATION: LEG

## 2024-02-26 ASSESSMENT — PAIN DESCRIPTION - ORIENTATION: ORIENTATION: RIGHT;LEFT

## 2024-02-26 NOTE — PROGRESS NOTES
Hospitalist Progress Note    NAME:   Karson Rodriguez   : 1940   MRN: [id number]     Date/Time: 2024 4:17 PM  Patient PCP: Sybil Manuel APRN - NP    Estimated discharge date:   Barriers:       Assessment / Plan:    Syncope - possibly related to orthostatic hypotension vs hypovolemia   CT chest ordered-revealed similar pulmonary embolism, no increasing clot burden.  Per son-the rehab told him that his father had passed out on the wheelchair.  Per the notes from EMS patient was found to be hypotensive around 80/50 -needing bolus fluid  Will continue on gentle hydration with normal saline   Measure orthostatic vitals every shift  Fall precautions  CT head: No acute changes   continue telemetry monitoring  Cardiology/neurology eval as needed  Follow-up urinalysis     Chest x-ray finding of pulmonary edema with right basilar opacity  Possible pneumonia  Stable right pleural effusion  Less likely CHF  COVID flu negative  CT-most likely atelectasis at this moment  BMP 7024  Most recent echo on 2024-ejection fraction up to 70%, left ventricle size normal.  Patient received ceftriaxone and azithromycin in the ED  Will continue that for now  blood cultures negative so far  WNL procalcitonin  Will get Legionella mycoplasma  Patient currently not needing any oxygen supplementation, continue to monitor saturation  Advised to continue incentive spirometer     Recent history of pulmonary embolism  Repeat CT does not reveal increased clot burden  Continue patient on apixaban     Mild hypokalemia  Supplement potassium     Sigmoid diverticulosis  Status post Whipple's procedure  Recent history of diverticulitis  Current CT scan does not show any diverticulitis.  Will hold off on antibiotics for diverticulitis.     Chronic history of diabetes, hypertension, hyperlipidemia mild dementia  Patient was taken off ACE inhibitors and beta-blocker in the last admission-need to hold off for now  Patient appears to be  and MAR    YES  PMH/SH reviewed - no change compared to H&P    Procedures: see electronic medical records for all procedures/Xrays and details which were not copied into this note but were reviewed prior to creation of Plan.      LABS:  I reviewed today's most current labs and imaging studies.  Pertinent labs include:  Recent Labs     02/25/24  1200   WBC 5.0   HGB 11.8*   HCT 37.6        Recent Labs     02/25/24  1200 02/25/24  1203     --    K 3.4*  --      --    CO2 23  --    GLUCOSE 89  --    BUN 6  --    CREATININE 1.03 0.7   CALCIUM 7.9*  --    LABALBU 2.5*  --    BILITOT 0.3  --    AST 31  --    ALT 14  --        Signed: Shanna Lundberg MD

## 2024-02-26 NOTE — CONSULTS
ASSESSMENT:   General: [x] Oriented x3  [] Well appearing  [] Intubated  []Ill appearing  []Other: obese built   Mental Status: [] Normal mental status exam  [] Drowsy  [] Confused  []Other:  Cardiovascular: [] Regular rate/rhythm  [] Arrhythmia  [] Other:  Chest: [x] Effort normal  []Lungs clear  [] Respiratory distress  []Tachypnea  [] Other:  Abdomen: [] Soft/non-tender  [] Normal appearance  [] Distended  [] Ascites  [] Other:  Neurological: [x] Normal speech  [] Normal sensation  []Deficits present: processing slow   Extremity: [] Normal skin color/temp  [] Clubbing/cyanosis  [] No edema  [] Other: some bruises at lower extremity .    Wt Readings from Last 15 Encounters:   02/25/24 100.8 kg (222 lb 3.6 oz)   02/21/24 93.4 kg (205 lb 14.6 oz)   02/15/24 96.2 kg (212 lb)   12/14/23 94.6 kg (208 lb 8.9 oz)   08/23/23 98.7 kg (217 lb 9.5 oz)        Current Diet: ADULT DIET; Regular       PSYCHOSOCIAL/SPIRITUAL SCREENING:   Palliative IDT has assessed this patient for cultural preferences / practices and a referral made as appropriate to needs (Cultural Services, Patient Advocacy, Ethics, etc.)    Spiritual Affiliation: [employer]ian    Any spiritual / Zoroastrian concerns:  [] Yes /  [x] No   If \"Yes\" to discuss with pastoral care during IDT     Does caregiver feel burdened by caring for their loved one:   [] Yes /  [x] No /  [] No Caregiver Present/Available [] No Caregiver [] Pt Lives at Facility  If \"Yes\" to discuss with social work during IDT    Anticipatory grief assessment:   [x] Normal  / [] Maladaptive     If \"Maladaptive\" to discuss with social work during IDT    ESAS Anxiety: Anxiety Score: Not anxious    ESAS Depression:          LAB AND IMAGING FINDINGS:   Objective data reviewed:  labs, images, records, medication use, vitals, and chart     FINAL COMMENTS   Thank you for allowing Palliative Medicine to participate in the care of Karson Rodriguez.    Only check if applicable and billing time based rather than

## 2024-02-26 NOTE — PROGRESS NOTES
End of Shift Note    Bedside shift change report given to KYRA Mayen (oncoming nurse) by Nella Blount RN (offgoing nurse).  Report included the following information Nurse Handoff Report, ED SBAR, MAR, Recent Results, Quality Measures, and Neuro Assessment      Shift worked:  Days   Shift summary and any significant changes:     Patient admitted late this evening from the ER.  Urine sent, Lactic Acid sent.  Abx administered.        Concerns for physician to address:  Plan   Zone phone for oncoming shift:        Patient Information  Karson Rodriguez  83 y.o.  2/25/2024 11:21 AM by Marcy Ng MD. 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    Diabetes mellitus (HCC) 02/06/2015    Hyponatremia 11/26/2014     Past Medical History:   Diagnosis Date    Aneurysm (HCC)     aortic    Arrhythmia     A-fib    Calculus of kidney     Cancer (HCC)     Chronic kidney disease     stone    Diabetes (HCC)     Fast heart beat     Former tobacco use     Hypertension     Muscle weakness     Prostate disorder     Stomach pain        Core Measures:  CVA: No No  CHF:No No  PNA:Sanjeev    Activity:     Number times ambulated in hallways past shift: 0  Number of times OOB to chair past shift: 0    Cardiac:   Cardiac Monitoring: Yes           Access:   Current line(s): PIV  Central Line? No    Genitourinary:   Urinary status: voiding , incontinent, and external catheter  Urinary Catheter? No     Respiratory:   O2 Device: None (Room air)  Chronic home O2 use?: no  Incentive spirometer at bedside: no       GI:  Last BM (including prior to admit):  (Patient thinks it was 2/24/2024)  Current diet:  ADULT DIET; Regular  Passing flatus:  yes  Tolerating current diet: yes       Pain Management:   Patient states pain is manageable on current regimen: N/A    Skin:  Eulalio Scale Score: 17  Interventions: turn team, specialty bed, float heels, PT/OT consult, limit briefs, internal/external urinary devices, and nutritional support    Patient Safety:  Fall Score: Bragg Total Score: 85  Interventions: bed/chair alarm, assistive devices (walker, cane, etc.), gripper socks, pt to call before getting OOB, and stay with me (per policy)       DVT prophylaxis:  DVT prophylaxis Med- yes  DVT prophylaxis SCD or ROWENA- no     Wounds: (If Applicable)  Wounds- yes  Location--Scattered bruises, crack to the crack with a tiny maybe qaurter size red spot that is not really blanchable, dry skin, old scars (Zinc has been applied for protection)    Active Consults:  IP CONSULT TO PALLIATIVE CARE    Length of Stay:  Expected LOS: 2  Actual LOS: 0  Discharge Plan: yes       Nella Blount RN

## 2024-02-26 NOTE — PROGRESS NOTES
Brief note ( full note to follow ).    Met with patient who is able to engaged in conversation, comprehension is slow, with patient permission, I spoke to his son, Ronald who was driving and asked me to call tomorrow at 9:30 am to be included in goals of care conversation with patient .    Thank you allowing us to be part of [facility] .

## 2024-02-26 NOTE — CARE COORDINATION
LUIS F:     RUR: Calculating      02/26/24 1542   Readmission Assessment   Number of Days since last admission? 1-7 days   Previous Disposition Other (comment)  ([facility])   Who is being Interviewed Patient   What was the patient's/caregiver's perception as to why they think they needed to return back to the hospital? Other (Comment)  (hypotension)   Did you visit your Primary Care Physician after you left the hospital, before you returned this time? No   Why weren't you able to visit your PCP? Other (Comment)  (Patient discharged from [facility] yesterday)   Did you see a specialist, such as Cardiac, Pulmonary, Orthopedic Physician, etc. after you left the hospital? No   Who advised the patient to return to the hospital? Other (Comment)  ([facility] staff)   Does the patient report anything that got in the way of taking their medications? No  (Unclear)   In our efforts to provide the best possible care to you and others like you, can you think of anything that we could have done to help you after you left the hospital the first time, so that you might not have needed to return so soon? Other (Comment)  (Clarify discharge medications and complete thorough medical assessment)     Full CM assessment to follow.    Elvira Marlow LMSW,   [phone]

## 2024-02-26 NOTE — CARE COORDINATION
LUIS F:    RUR: \"Calculating\"    Pt lives at [facility]. Pt is a readmit due to hypotension. Pt was discharged from [facility] yesterday, 2/25/24.     Pt requires assistance with completing ADLs. He has a rolling walker, manual w/c, and hospital bed at home. Pt's son is supportive and involved in his care.    CM to follow for discharge planning.     02/26/24 0415   Service Assessment   Patient Orientation Other (see comment)  (Pt unavailable to assess due to being asleep. CM was uable to get in touch with pt's son via phone. Assessment completed after review of the chart (review of previous assessment completed on 2/21/24).)   Cognition Other (see comment)  (N/A)   History Provided By Child/Family;Medical Record   Primary Caregiver Other (Comment)  (Staff at [facility])   Accompanied By/Relationship None   Support Systems Children;Other (Comment)  ([facility] staff)   Patient's Healthcare Decision Maker is: Legal Next of Kin   PCP Verified by CM Yes   Last Visit to PCP Within last 3 months   Prior Functional Level Assistance with the following:;Bathing;Dressing;Feeding;Cooking;Housework;Shopping;Mobility   Current Functional Level Assistance with the following:;Bathing;Dressing;Feeding;Cooking;Housework;Shopping;Mobility   Can patient return to prior living arrangement Yes   Ability to make needs known: Other (see comment)  (Unable at this time due to being asleep)   Family able to assist with home care needs: Yes  (Son)   Would you like for me to discuss the discharge plan with any other family members/significant others, and if so, who? Yes  (Son, Ronald Rodriguez, [phone])   Financial Resources Medicare   Community Resources Assisted Living   CM/SW Referral Other (see comment)  (Discharge planning: outpatient follow up and disposition planning)   Social/Functional History   Lives With Alone   Type of Home Assisted living   Discharge Planning   Type of Residence Assisted living   Living

## 2024-02-26 NOTE — PROGRESS NOTES
Patient would like his PCP notified of his admission.  Office currently closed at time of admission to floor.    Dr. Noemi Goodwin office off of [facility]

## 2024-02-26 NOTE — PROGRESS NOTES
Regular  Passing flatus: yes  Tolerating current diet: yes       Pain Management:   Patient states pain is manageable on current regimen: N/A    Skin:  Eulalio Scale Score: 17  Interventions: turn team, specialty bed, float heels, PT/OT consult, limit briefs, internal/external urinary devices, and nutritional support    Patient Safety:  Fall Score: Bragg Total Score: 85  Interventions: bed/chair alarm, assistive devices (walker, cane, etc.), gripper socks, pt to call before getting OOB, and stay with me (per policy)       DVT prophylaxis:  DVT prophylaxis Med- yes  DVT prophylaxis SCD or ROWENA- no     Wounds: (If Applicable)  Wounds- yes  Location--Scattered bruises, crack to the crack with a tiny maybe qaurter size red spot that is not really blanchable, dry skin, old scars (Zinc has been applied for protection)    Active Consults:  IP CONSULT TO PALLIATIVE CARE    Length of Stay:  Expected LOS: 2  Actual LOS: 1  Discharge Plan: yes       Gabrielle Cobos RN

## 2024-02-27 ENCOUNTER — APPOINTMENT (OUTPATIENT)
Facility: HOSPITAL | Age: 84
End: 2024-02-27
Attending: STUDENT IN AN ORGANIZED HEALTH CARE EDUCATION/TRAINING PROGRAM
Payer: MEDICARE

## 2024-02-27 PROBLEM — R53.1 WEAKNESS: Status: ACTIVE | Noted: 2024-02-27

## 2024-02-27 PROBLEM — M79.605 BILATERAL LOWER EXTREMITY PAIN: Status: ACTIVE | Noted: 2024-02-27

## 2024-02-27 PROBLEM — Z51.5 PALLIATIVE CARE ENCOUNTER: Status: ACTIVE | Noted: 2024-02-27

## 2024-02-27 PROBLEM — M79.604 BILATERAL LOWER EXTREMITY PAIN: Status: ACTIVE | Noted: 2024-02-27

## 2024-02-27 LAB
ANION GAP SERPL CALC-SCNC: 7 MMOL/L (ref 5–15)
BASOPHILS # BLD: 0 K/UL (ref 0–0.1)
BASOPHILS NFR BLD: 1 % (ref 0–1)
BUN SERPL-MCNC: 4 MG/DL (ref 6–20)
BUN/CREAT SERPL: 5 (ref 12–20)
CALCIUM SERPL-MCNC: 7.9 MG/DL (ref 8.5–10.1)
CHLORIDE SERPL-SCNC: 107 MMOL/L (ref 97–108)
CO2 SERPL-SCNC: 25 MMOL/L (ref 21–32)
CREAT SERPL-MCNC: 0.77 MG/DL (ref 0.7–1.3)
DIFFERENTIAL METHOD BLD: ABNORMAL
ECHO AV AREA PEAK VELOCITY: 1.4 CM2
ECHO AV AREA VTI: 1.6 CM2
ECHO AV AREA/BSA VTI: 0.7 CM2/M2
ECHO AV CUSP MM: 1.4 CM
ECHO AV MEAN GRADIENT: 11 MMHG
ECHO AV MEAN VELOCITY: 1.4 M/S
ECHO AV PEAK GRADIENT: 20 MMHG
ECHO AV PEAK GRADIENT: 20 MMHG
ECHO AV PEAK VELOCITY: 2.2 M/S
ECHO AV PEAK VELOCITY: 2.2 M/S
ECHO AV VTI: 38.9 CM
ECHO BSA: 2.23 M2
ECHO LV E' LATERAL VELOCITY: 5 CM/S
ECHO LV E' SEPTAL VELOCITY: 7 CM/S
ECHO LVOT AREA: 3.5 CM2
ECHO LVOT AV VTI INDEX: 0.48
ECHO LVOT DIAM: 2.1 CM
ECHO LVOT MEAN GRADIENT: 3 MMHG
ECHO LVOT PEAK GRADIENT: 4 MMHG
ECHO LVOT PEAK GRADIENT: 4 MMHG
ECHO LVOT PEAK VELOCITY: 0.9 M/S
ECHO LVOT PEAK VELOCITY: 1 M/S
ECHO LVOT STROKE VOLUME INDEX: 29.5 ML/M2
ECHO LVOT SV: 64.4 ML
ECHO LVOT VTI: 18.6 CM
ECHO MV AREA VTI: 2.8 CM2
ECHO MV E DECELERATION TIME (DT): 100.7 MS
ECHO MV E VELOCITY: 1.01 M/S
ECHO MV E/E' LATERAL: 20.2
ECHO MV E/E' RATIO (AVERAGED): 17.31
ECHO MV LVOT VTI INDEX: 1.26
ECHO MV MAX VELOCITY: 1.2 M/S
ECHO MV MEAN GRADIENT: 4 MMHG
ECHO MV MEAN VELOCITY: 0.9 M/S
ECHO MV PEAK GRADIENT: 6 MMHG
ECHO MV VTI: 23.4 CM
ECHO PV MAX VELOCITY: 1.5 M/S
ECHO PV PEAK GRADIENT: 8 MMHG
ECHO RV FREE WALL PEAK S': 12 CM/S
ECHO RV INTERNAL DIMENSION: 3.6 CM
ECHO RVOT PEAK GRADIENT: 3 MMHG
ECHO RVOT PEAK VELOCITY: 0.8 M/S
ECHO TV REGURGITANT MAX VELOCITY: 1.37 M/S
ECHO TV REGURGITANT PEAK GRADIENT: 8 MMHG
EOSINOPHIL # BLD: 0.5 K/UL (ref 0–0.4)
EOSINOPHIL NFR BLD: 11 % (ref 0–7)
ERYTHROCYTE [DISTWIDTH] IN BLOOD BY AUTOMATED COUNT: 16 % (ref 11.5–14.5)
GLUCOSE BLD STRIP.AUTO-MCNC: 112 MG/DL (ref 65–117)
GLUCOSE BLD STRIP.AUTO-MCNC: 117 MG/DL (ref 65–117)
GLUCOSE BLD STRIP.AUTO-MCNC: 119 MG/DL (ref 65–117)
GLUCOSE BLD STRIP.AUTO-MCNC: 126 MG/DL (ref 65–117)
GLUCOSE SERPL-MCNC: 116 MG/DL (ref 65–100)
HCT VFR BLD AUTO: 34.9 % (ref 36.6–50.3)
HGB BLD-MCNC: 11.4 G/DL (ref 12.1–17)
IMM GRANULOCYTES # BLD AUTO: 0 K/UL (ref 0–0.04)
IMM GRANULOCYTES NFR BLD AUTO: 0 % (ref 0–0.5)
LYMPHOCYTES # BLD: 1.9 K/UL (ref 0.8–3.5)
LYMPHOCYTES NFR BLD: 38 % (ref 12–49)
MAGNESIUM SERPL-MCNC: 1.6 MG/DL (ref 1.6–2.4)
MCH RBC QN AUTO: 30.3 PG (ref 26–34)
MCHC RBC AUTO-ENTMCNC: 32.7 G/DL (ref 30–36.5)
MCV RBC AUTO: 92.8 FL (ref 80–99)
MONOCYTES # BLD: 0.4 K/UL (ref 0–1)
MONOCYTES NFR BLD: 9 % (ref 5–13)
NEUTS SEG # BLD: 2 K/UL (ref 1.8–8)
NEUTS SEG NFR BLD: 41 % (ref 32–75)
NRBC # BLD: 0 K/UL (ref 0–0.01)
NRBC BLD-RTO: 0 PER 100 WBC
PLATELET # BLD AUTO: 258 K/UL (ref 150–400)
PMV BLD AUTO: 10.3 FL (ref 8.9–12.9)
POTASSIUM SERPL-SCNC: 3 MMOL/L (ref 3.5–5.1)
RBC # BLD AUTO: 3.76 M/UL (ref 4.1–5.7)
SERVICE CMNT-IMP: ABNORMAL
SERVICE CMNT-IMP: ABNORMAL
SERVICE CMNT-IMP: NORMAL
SERVICE CMNT-IMP: NORMAL
SODIUM SERPL-SCNC: 139 MMOL/L (ref 136–145)
WBC # BLD AUTO: 4.9 K/UL (ref 4.1–11.1)

## 2024-02-27 PROCEDURE — 93306 TTE W/DOPPLER COMPLETE: CPT

## 2024-02-27 PROCEDURE — 83735 ASSAY OF MAGNESIUM: CPT

## 2024-02-27 PROCEDURE — 97162 PT EVAL MOD COMPLEX 30 MIN: CPT

## 2024-02-27 PROCEDURE — 6360000002 HC RX W HCPCS: Performed by: STUDENT IN AN ORGANIZED HEALTH CARE EDUCATION/TRAINING PROGRAM

## 2024-02-27 PROCEDURE — 6370000000 HC RX 637 (ALT 250 FOR IP): Performed by: STUDENT IN AN ORGANIZED HEALTH CARE EDUCATION/TRAINING PROGRAM

## 2024-02-27 PROCEDURE — 97535 SELF CARE MNGMENT TRAINING: CPT

## 2024-02-27 PROCEDURE — 97530 THERAPEUTIC ACTIVITIES: CPT

## 2024-02-27 PROCEDURE — 6370000000 HC RX 637 (ALT 250 FOR IP): Performed by: NURSE PRACTITIONER

## 2024-02-27 PROCEDURE — 82962 GLUCOSE BLOOD TEST: CPT

## 2024-02-27 PROCEDURE — 85025 COMPLETE CBC W/AUTO DIFF WBC: CPT

## 2024-02-27 PROCEDURE — 36415 COLL VENOUS BLD VENIPUNCTURE: CPT

## 2024-02-27 PROCEDURE — 1100000000 HC RM PRIVATE

## 2024-02-27 PROCEDURE — 97166 OT EVAL MOD COMPLEX 45 MIN: CPT

## 2024-02-27 PROCEDURE — 80048 BASIC METABOLIC PNL TOTAL CA: CPT

## 2024-02-27 PROCEDURE — 2580000003 HC RX 258: Performed by: STUDENT IN AN ORGANIZED HEALTH CARE EDUCATION/TRAINING PROGRAM

## 2024-02-27 RX ORDER — HYDROXYZINE HYDROCHLORIDE 10 MG/1
10 TABLET, FILM COATED ORAL ONCE
Status: COMPLETED | OUTPATIENT
Start: 2024-02-27 | End: 2024-02-27

## 2024-02-27 RX ADMIN — POTASSIUM BICARBONATE 40 MEQ: 782 TABLET, EFFERVESCENT ORAL at 04:20

## 2024-02-27 RX ADMIN — SODIUM CHLORIDE 1000 MG: 900 INJECTION INTRAVENOUS at 16:45

## 2024-02-27 RX ADMIN — HYDROXYZINE HYDROCHLORIDE 10 MG: 10 TABLET ORAL at 20:49

## 2024-02-27 RX ADMIN — SODIUM CHLORIDE: 9 INJECTION, SOLUTION INTRAVENOUS at 14:14

## 2024-02-27 RX ADMIN — ASPIRIN 81 MG: 81 TABLET, CHEWABLE ORAL at 09:30

## 2024-02-27 RX ADMIN — APIXABAN 5 MG: 5 TABLET, FILM COATED ORAL at 20:49

## 2024-02-27 RX ADMIN — ACETAMINOPHEN 650 MG: 325 TABLET ORAL at 04:28

## 2024-02-27 RX ADMIN — APIXABAN 5 MG: 5 TABLET, FILM COATED ORAL at 09:30

## 2024-02-27 RX ADMIN — PANTOPRAZOLE SODIUM 40 MG: 40 TABLET, DELAYED RELEASE ORAL at 09:31

## 2024-02-27 RX ADMIN — ATORVASTATIN CALCIUM 20 MG: 20 TABLET, FILM COATED ORAL at 20:49

## 2024-02-27 RX ADMIN — AZITHROMYCIN 250 MG: 250 TABLET, FILM COATED ORAL at 09:30

## 2024-02-27 ASSESSMENT — PAIN DESCRIPTION - LOCATION: LOCATION: HEAD

## 2024-02-27 ASSESSMENT — PAIN SCALES - GENERAL
PAINLEVEL_OUTOF10: 0
PAINLEVEL_OUTOF10: 0
PAINLEVEL_OUTOF10: 3

## 2024-02-27 ASSESSMENT — PAIN - FUNCTIONAL ASSESSMENT: PAIN_FUNCTIONAL_ASSESSMENT: ACTIVITIES ARE NOT PREVENTED

## 2024-02-27 ASSESSMENT — PAIN DESCRIPTION - DESCRIPTORS: DESCRIPTORS: ACHING

## 2024-02-27 NOTE — PROGRESS NOTES
End of Shift Note    Bedside shift change report given to KYRA Luz (oncoming nurse) by Lynsey Simmons RN (offgoing nurse).  Report included the following information Nurse Handoff Report, MAR, Recent Results, Med Rec Status, Cardiac Rhythm NSR, Quality Measures, and Neuro Assessment      Shift worked:  7P-7A   Shift summary and any significant changes:    Turned Q2, no acute changes overnight. Potassium of 3.0 replaced.       Concerns for physician to address:     Zone phone for oncoming shift:   [phone]     Patient Information  Karson Rodriguez  83 y.o.  2/25/2024 11:21 AM by Marcy Ng MD. 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    Diabetes mellitus (HCC) 02/06/2015    Hyponatremia 11/26/2014     Past Medical History:   Diagnosis Date    Aneurysm (HCC)     aortic    Arrhythmia     A-fib    Calculus of kidney     Cancer (HCC)     Chronic kidney disease     stone    Diabetes (HCC)     Fast heart beat     Former tobacco use     Hypertension     Muscle weakness     Prostate disorder     Stomach pain        Core Measures:  CVA: No  CHF:No No  PNA:NoNo    Activity:  Activity: In bed  Number times ambulated in hallways past shift: 0  Number of times OOB to chair past shift: 0    Cardiac:   Cardiac Monitoring: Yes      Cardiac Rhythm: Sinus rhythm    Access:   Current line(s): PIV  Central Line? No Placement date  Reason Medically Necessary   PICC LINE? No Placement date Reason Medically Necessary     Genitourinary:   Urinary status: voiding , incontinent, and external catheter  Urinary Catheter? No Placement Date  Reason Medically Necessary     Respiratory:   O2 Device: None (Room air)  Chronic home O2 use?:

## 2024-02-27 NOTE — CONSULTS
Palliative Medicine  Patient Name: Karson Rodriguez  YOB: 1940  MRN: [id number]  Age: 83 y.o.  Gender: male    Date of Initial Consult: 2/25/24  Date of Service: 2/27/2024  Time: 10:21 AM  Provider: Laura Giles MD  Hospital Day: 3  Admit Date: 2/25/2024  Referring Provider: Santos Casarez MD      Reasons for Consultation:  Goals of Care and Overwhelming Symptoms    HISTORY OF PRESENT ILLNESS (HPI):   Karson Rodriguez is a 83 y.o. male with a past medical history of recent pulmonary embolism on Eliquis sigmoid diverticulosis s/p Whipple's procedure, diabetes, hypertension, hyperlipidemia and mild dementia who was admitted on 2/25/2024 from diagnosis  of current syncope possibly orthostasis versus hypovolemia, CT scan shows old PE no increasing clot burden.  Patient was found to have passed out in the wheelchair in rehab found to be hypotensive around 80/ 50 notes from EMS required IV bolus.  Patient was recently discharged from [facility].  On 2/23/2024, presented with syncopal episode found to have bilateral PEs no DVT he was placed on Eliquis, discharged to rehab.  Patient on I/V Fluids currently.  per cardiology syncope most likely due to low blood pressure cardiology suggested IV hydration, event monitor as outpatient to evaluate for bradycardia   Echo Normal left ventricular systolic function with a visually estimated EF of 65 - 70%. Left ventricle size is normal, normal systolic function .  Psychosocial: Patient lives in long term care, basically bed bund due to lower extremity weakness,  he has 2 son, Ronald lives close to him, and seems to be more involved.    Advanced directive: None  His 2 sons are legal next of kin    PALLIATIVE DIAGNOSES:    Palliative care encounter    Generalized weakness    Lower extremity pain    Dementia with Limited cognition/comprehension( Mri of brain 10/21(Tiny subacute infarct in the right cerebellum. Stable sellar/suprasellar pituitary macroadenoma  Medicine to participate in the care of Karson ARLENE Rodriguez.    Only check if applicable and billing time based rather than MDM  [] The total encounter time on this service date was __55 mins__ minutes which was spent performing a face-to-face encounter and personally completing the provider-level activities documented in the note. This includes time spent prior to the visit and after the visit in direct care of the patient. This time does not include time spent in any separately reportable services.    Electronically signed by   Laura Giles MD  Palliative Care Team  on 2/27/2024 at 10:21 AM

## 2024-02-27 NOTE — PROGRESS NOTES
End of Shift Note    Bedside shift change report given to KYRA Menon (oncoming nurse) by Kelly Anderson RN (offgoing nurse).  Report included the following information Nurse Handoff Report, MAR, Recent Results, Med Rec Status, Cardiac Rhythm NSR, Quality Measures, and Neuro Assessment      Shift worked: Days   Shift summary and any significant changes:    Q2 turns documented  No acute changes  Case management following      Concerns for physician to address:     Zone phone for oncoming shift:   [phone]     Patient Information  Karson Rodriguez  83 y.o.  2/25/2024 11:21 AM by Marcy Ng MD. Karson Rodriguez was admitted from Home    Problem List  Patient Active Problem List    Diagnosis Date Noted    Palliative care encounter 02/27/2024    Bilateral lower extremity pain 02/27/2024    Weakness 02/27/2024    Dehydration 02/25/2024    Syncope and collapse 02/20/2024    Severe sepsis (HCC) 12/14/2023    Altered mental status 06/12/2018    Fever 09/19/2017    Metastatic melanoma (HCC) 04/07/2016    Carcinoma of duodenum (HCC) 02/25/2015    Diabetes mellitus (HCC) 02/06/2015    Hyponatremia 11/26/2014     Past Medical History:   Diagnosis Date    Aneurysm (HCC)     aortic    Arrhythmia     A-fib    Calculus of kidney     Cancer (HCC)     Chronic kidney disease     stone    Diabetes (HCC)     Fast heart beat     Former tobacco use     Hypertension     Muscle weakness     Prostate disorder     Stomach pain        Core Measures:  CVA: No  CHF:No No  PNA:NoNo    Activity:  Activity: In bed  Number times ambulated in hallways past shift: 0  Number of times OOB to chair past shift: 0    Cardiac:   Cardiac Monitoring: Yes      Cardiac Rhythm: Sinus rhythm    Access:   Current line(s): PIV  Central Line? No Placement date  Reason Medically Necessary   PICC LINE? No Placement date Reason Medically Necessary     Genitourinary:   Urinary status: voiding , incontinent, and external catheter  Urinary Catheter? No Placement Date   Reason Medically Necessary     Respiratory:   O2 Device: None (Room air)  Chronic home O2 use?: no  Incentive spirometer at bedside: no       GI:  Last BM (including prior to admit): 02/27/24  Current diet:  ADULT DIET; Regular  Passing flatus:   Tolerating current diet: yes       Pain Management:   Patient states pain is manageable on current regimen: yes    Skin:  Eulalio Scale Score: 14  Interventions: float heels, increase time out of bed, PT/OT consult, limit briefs, and internal/external urinary devices    Patient Safety:  Fall Score: Bragg Total Score: 75  Interventions: bed/chair alarm, gripper socks, pt to call before getting OOB, and stay with me (per policy)     @Rollbelt  @dexterity to release roll belt  Yes/No ( must document dexterity  here by stating Yes or No here, otherwise this is a restraint and must follow restraint documentation policy.)    DVT prophylaxis:  DVT prophylaxis Med- yes  DVT prophylaxis SCD or ROWENA- no     Wounds: (If Applicable)  Wounds- no    Active Consults:  IP CONSULT TO PALLIATIVE CARE  IP CONSULT TO CARDIOLOGY    Length of Stay:  Expected LOS: 3  Actual LOS: 2  Discharge Plan: LUCIANO Anderson RN

## 2024-02-27 NOTE — PLAN OF CARE
Problem: Occupational Therapy - Adult  Goal: By Discharge: Performs self-care activities at highest level of function for planned discharge setting.  See evaluation for individualized goals.  Description: FUNCTIONAL STATUS PRIOR TO ADMISSION:  Patient unreliable historian. Per chart review, patient admitted from SNF rehab.    ,  ,  ,  ,  ,  ,  ,  ,  ,  ,       HOME SUPPORT: Patient lived in correction.    Occupational Therapy Goals:  Initiated 2/27/2024  1.  Patient will perform grooming sitting EOB with Contact Guard Assist within 7 day(s).  2.  Patient will perform upper body dressing with Minimal Assist within 7 day(s).  3.  Patient will perform supine > sit to prepare for OOB ADLs with Moderate Assist within 7 day(s).  4.  Patient will perform toilet transfers to [facility] with Maximal Assist  within 7 day(s).  5.  Patient will perform all aspects of toileting with Moderate Assist within 7 day(s).  6.  Patient will participate in upper extremity therapeutic exercise/activities with Stand by Assist for 3 minutes within 7 day(s).    Outcome: Progressing   OCCUPATIONAL THERAPY EVALUATION    Patient: Karson Rodriguez (83 y.o. male)  Date: 2/27/2024  Primary Diagnosis: Dehydration [E86.0]  Pleural effusion [J90]  Community acquired pneumonia, unspecified laterality [J18.9]         Precautions: Fall Risk                  ASSESSMENT :  The patient is limited by decreased functional mobility, independence in ADLs, ROM, strength, sensation, body mechanics, activity tolerance, endurance, safety awareness, cognition, attention/concentration, coordination, balance, posture, orthostatic hypotension.    Based on the impairments listed above patient is functioning below his baseline for ADLs and functional mobility. He is now completing ADLs with set-up to total assist and functional mobility with mod to max assist x2. Patient received semisupine in bed and cleared for therapy by nursing. He was oriented to self and place only and very

## 2024-02-27 NOTE — PROGRESS NOTES
Hospitalist Progress Note    NAME:   Karson Rodriguez   : 1940   MRN: [id number]     Date/Time: 2024 11:50 AM  Patient PCP: Sybil Manuel APRN - NP    Estimated discharge date:   Barriers: echo,       Assessment / Plan:    Syncope - possibly related to orthostatic hypotension vs hypovolemia   CT chest ordered-revealed similar pulmonary embolism, no increasing clot burden.  Per son-the rehab told him that his father had passed out on the wheelchair.  Per the notes from EMS patient was found to be hypotensive around 80/50 -needing bolus fluid  Will continue on gentle hydration with normal saline   Measure orthostatic vitals every shift  Fall precautions  CT head: No acute changes   continue telemetry monitoring  Will get echo  Cardio consult      Chest x-ray finding of pulmonary edema with right basilar opacity  Possible pneumonia  Stable right pleural effusion  Less likely CHF  COVID flu negative  CT-most likely atelectasis at this moment  BMP 7024  Most recent echo on 2024-ejection fraction up to 70%, left ventricle size normal.  Patient received ceftriaxone and azithromycin in the ED  Will continue that for now  blood cultures negative so far  WNL procalcitonin  Will get Legionella mycoplasma  Patient currently not needing any oxygen supplementation, continue to monitor saturation  Advised to continue incentive spirometer     Recent history of pulmonary embolism  Repeat CT does not reveal increased clot burden  Continue patient on apixaban     Mild hypokalemia  Supplement potassium     Sigmoid diverticulosis  Status post Whipple's procedure  Recent history of diverticulitis  Current CT scan does not show any diverticulitis.  Will hold off on antibiotics for diverticulitis.     Chronic history of diabetes, hypertension, hyperlipidemia mild dementia  Patient was taken off ACE inhibitors and beta-blocker in the last admission-need to hold off for now  Patient appears to be on pioglitazone for

## 2024-02-27 NOTE — PLAN OF CARE
Problem: Physical Therapy - Adult  Goal: By Discharge: Performs mobility at highest level of function for planned discharge setting.  See evaluation for individualized goals.  Description: Description: FUNCTIONAL STATUS PRIOR TO ADMISSION: Per chart, as pt is poor historian, patient ambulated with assist with use of RW but also has a wc if needed. Pt was admitted from SNF after recently d/c from [facility] on 2/24 and readmitted 2/25.    HOME SUPPORT PRIOR TO ADMISSION: Patient resides at [facility] with assist for mobility and ADLs. Pt admitted from SNF after being there for what seems like ~24 hours      Physical Therapy Goals  Initiated 2/27/2024  1.  Patient will move from supine to sit and sit to supine and roll side to side in bed with moderate assistance within 7 day(s).    2.  Patient will perform sit to stand with maximal assistance within 7 day(s).  3.  Patient will transfer from bed to chair and chair to bed with maximal assistance using the least restrictive device within 7 day(s).  4.  Patient will ambulate with maximal assistance for 3 feet with the least restrictive device within 7 day(s).       Outcome: Not Progressing   PHYSICAL THERAPY EVALUATION    Patient: Karson Rodriguez (83 y.o. male)  Date: 2/27/2024  Primary Diagnosis: Dehydration [E86.0]  Pleural effusion [J90]  Community acquired pneumonia, unspecified laterality [J18.9]       Precautions: Restrictions/Precautions: Fall Risk                      ASSESSMENT :   DEFICITS/IMPAIRMENTS:   The patient is limited by decreased functional mobility, ROM, strength, activity tolerance, cognition, command following, balance, gait, s/p re-admission on 2/25 from SNF for syncopal episode      Based on the impairments listed above pt required max A x 2 supine to sit EOB and fair seated EOB balance. Pt's BP stable during session. Pt requested to return to supine position. Pt argumentative during session as well as hard of hearing and baseline cognitive deficits making  Raven Miranda Frederick S. Frost, Alan M. Jette.  Validity of the AM-PAC “6-Clicks” Inpatient Daily Activity and Basic Mobility Short Forms. Physical Therapy Mar 2014, 94 (3) 379-391; DOI: 10.2522/ptj.29000861  2. Candido Baumann, Kirk BRADSHAW, Mike BRADSHAW. Association of AM-PAC \"6-Clicks\" Basic Mobility and Daily Activity Scores With Discharge Destination. Phys Ther. 2021 4;101(4):ndkd941. doi: 10.1093/ptj/tuvo356. PMID: 90978321.  3. Franc BRADSHAW, Batsheva WALLIS, Felicity S, Kate K, Matteo S. Activity Measure for Post-Acute Care \"6-Clicks\" Basic Mobility Scores Predict Discharge Destination After Acute Care Hospitalization in Select Patient Groups: A Retrospective, Observational Study. Arch Rehabil Res Clin Transl. 2022 16;4(3):717576. doi: 10.1016/j.arrct..716354. PMID: 79893964; PMCID: CIC5128052.  4. Ty SINGH, Geri S, Michaela W, Nanette P. AM-PAC Short Forms Manual 4.0. Revised 2020.                                                                                                                                                                                                                               Pain Ratin/10   Pain Intervention(s):       Activity Tolerance:   Fair     After treatment:   Patient left in no apparent distress in bed, Call bell within reach, Bed/ chair alarm activated, and Side rails x3    COMMUNICATION/EDUCATION:   The patient's plan of care was discussed with: occupational therapist and registered nurse    Patient Education  Education Given To: Patient  Education Provided: Role of Therapy;Plan of Care  Education Method: Verbal  Barriers to Learning: Cognition;Hearing  Education Outcome: Continued education needed    Thank you for this referral.  Sandra Calero PT, DPT  Minutes: 22

## 2024-02-27 NOTE — CARE COORDINATION
Transition of Care Plan:    RUR: 21%  Prior Level of Functioning: Needs assistance  Disposition: [facility]  If SNF or IPR: Date FOC offered: Patient is a current resident of [facility]  Date FOC received: N/A  Follow up appointments: Defer to SNF  DME needed: TBD  Transportation at discharge: [facility] vs Family  IM/IMM Medicare/ letter given: Will need 2nd IMM upon discharge  Is patient a  and connected with [facility]? No   If yes, was [place] transfer form completed and [facility] notified? N/A  Caregiver Contact: [facility] Staff  Discharge Caregiver contacted prior to discharge? Yes - CM will contact  Care Conference needed? Not at this time  Barriers to discharge:  Medical stability     CM received a call from Gabriel with [facility] [phone]. Gabriel told CM that pt is currently enrolled in HH services with them for skilled nursing and PT. Pt is not currently receiving OT services. [facility] will need resumption of care orders upon discharge.    CM to discuss with PT/OT in IDR in the AM.    Elvira Marlow LMSW,   [phone]

## 2024-02-27 NOTE — PROGRESS NOTES
Nursing contacted Nocturnist/cross cover provider and notified patient lab result of k 3.0. VSS. No other reported concerns at this time. Ordered kcl 40meq po x1. Patient denies any further complaints or concerns. No acute distress reported. Nursing to notify Hospitalist for further/continued concerns. Will remain available overnight for further concerns if nursing/patient needs. Will defer further evaluation/management to the day shift primary care team.    Non-billable note.

## 2024-02-27 NOTE — CONSULTS
[facility]        Consult    NAME: Karson Rodriguez   :  1940   MRN:  [id number]     Date/Time:  2024 11:25 AM    Patient PCP: Sybil Manuel APRN - NP  ________________________________________________________________________     Assessment:     Possible Syncope vs Orthostatic Hypotension  PAF  History of PE  Hypertension  Type 2 Diabetes  Mild Dementia  Hypokalemia  Hyperlipidemia  History of Whipple  Acute Diverticulitis  Anemia        Plan:   Further plan per Dr Pozo.   TTE with LVEF 65-70%, Mild MAC, trace MR, TR. Check orthostatics. Sounds likely  related to oral intake. EKG with SR and first degree block. Careful with IVF as BNP elevated. May need some Midodrine if orthostatic. He was taken off ACE and BB last admission.  Eliquis for PE history and PAF - has frequent falls  Agree with palliative consult     [x]       High complexity decision making was performed in this patient at high risk for decompensation with multiple organ involvement.      Subjective:   CHIEF COMPLAINT: Presyncope vs Syncope/ Orthostasis    HISTORY OF PRESENT ILLNESS:     Karson is a 83 y.o.   male who was recently discharged after possible syncopal episode versus orthostasis and was found to be in Afib with hypotension. He was undergoing treatment for diverticulitis . Work up included TTE and discontinuation of blood pressure medications. .  Today, He is a poor historian. He has no complaints. He says he has not been out of bed. Would be helpful to have PT eval.    We were asked to consult for work up and evaluation of the above problems.     Past Medical History:   Diagnosis Date    Aneurysm (HCC)     aortic    Arrhythmia     A-fib    Calculus of kidney     Cancer (HCC)     Chronic kidney disease     stone    Diabetes (HCC)     Fast heart beat     Former tobacco use     Hypertension     Muscle weakness     Prostate disorder     Stomach pain       Past Surgical History:   Procedure  NP   omeprazole (PRILOSEC) 20 MG delayed release capsule Take 1 capsule by mouth daily 1/30/24 3/30/24  Mitra Crespo APRN - NP       Recent Results (from the past 24 hour(s))   POCT Glucose    Collection Time: 02/26/24  8:29 PM   Result Value Ref Range    POC Glucose 107 65 - 117 mg/dL    Performed by: Angelo Hatfield PCT    Basic Metabolic Panel w/ Reflex to MG    Collection Time: 02/27/24  2:04 AM   Result Value Ref Range    Sodium 139 136 - 145 mmol/L    Potassium 3.0 (L) 3.5 - 5.1 mmol/L    Chloride 107 97 - 108 mmol/L    CO2 25 21 - 32 mmol/L    Anion Gap 7 5 - 15 mmol/L    Glucose 116 (H) 65 - 100 mg/dL    BUN 4 (L) 6 - 20 MG/DL    Creatinine 0.77 0.70 - 1.30 MG/DL    Bun/Cre Ratio 5 (L) 12 - 20      Est, Glom Filt Rate >60 >60 ml/min/1.73m2    Calcium 7.9 (L) 8.5 - 10.1 MG/DL   CBC with Auto Differential    Collection Time: 02/27/24  2:04 AM   Result Value Ref Range    WBC 4.9 4.1 - 11.1 K/uL    RBC 3.76 (L) 4.10 - 5.70 M/uL    Hemoglobin 11.4 (L) 12.1 - 17.0 g/dL    Hematocrit 34.9 (L) 36.6 - 50.3 %    MCV 92.8 80.0 - 99.0 FL    MCH 30.3 26.0 - 34.0 PG    MCHC 32.7 30.0 - 36.5 g/dL    RDW 16.0 (H) 11.5 - 14.5 %    Platelets 258 150 - 400 K/uL    MPV 10.3 8.9 - 12.9 FL    Nucleated RBCs 0.0 0  WBC    nRBC 0.00 0.00 - 0.01 K/uL    Neutrophils % 41 32 - 75 %    Lymphocytes % 38 12 - 49 %    Monocytes % 9 5 - 13 %    Eosinophils % 11 (H) 0 - 7 %    Basophils % 1 0 - 1 %    Immature Granulocytes 0 0.0 - 0.5 %    Neutrophils Absolute 2.0 1.8 - 8.0 K/UL    Lymphocytes Absolute 1.9 0.8 - 3.5 K/UL    Monocytes Absolute 0.4 0.0 - 1.0 K/UL    Eosinophils Absolute 0.5 (H) 0.0 - 0.4 K/UL    Basophils Absolute 0.0 0.0 - 0.1 K/UL    Absolute Immature Granulocyte 0.0 0.00 - 0.04 K/UL    Differential Type AUTOMATED     Magnesium    Collection Time: 02/27/24  2:04 AM   Result Value Ref Range    Magnesium 1.6 1.6 - 2.4 mg/dL   POCT Glucose    Collection Time: 02/27/24  6:13 AM   Result Value Ref Range    POC Glucose

## 2024-02-28 PROBLEM — Z71.89 ENCOUNTER FOR COUNSELING REGARDING ADVANCE DIRECTIVES: Status: ACTIVE | Noted: 2024-02-27

## 2024-02-28 PROBLEM — Z78.9 FULL CODE STATUS: Status: ACTIVE | Noted: 2024-02-28

## 2024-02-28 LAB
ANION GAP SERPL CALC-SCNC: 7 MMOL/L (ref 5–15)
BASOPHILS # BLD: 0 K/UL (ref 0–0.1)
BASOPHILS NFR BLD: 1 % (ref 0–1)
BUN SERPL-MCNC: 5 MG/DL (ref 6–20)
BUN/CREAT SERPL: 6 (ref 12–20)
CALCIUM SERPL-MCNC: 7.6 MG/DL (ref 8.5–10.1)
CHLORIDE SERPL-SCNC: 106 MMOL/L (ref 97–108)
CO2 SERPL-SCNC: 23 MMOL/L (ref 21–32)
CREAT SERPL-MCNC: 0.83 MG/DL (ref 0.7–1.3)
DIFFERENTIAL METHOD BLD: ABNORMAL
EOSINOPHIL # BLD: 0.4 K/UL (ref 0–0.4)
EOSINOPHIL NFR BLD: 8 % (ref 0–7)
ERYTHROCYTE [DISTWIDTH] IN BLOOD BY AUTOMATED COUNT: 16 % (ref 11.5–14.5)
GLUCOSE BLD STRIP.AUTO-MCNC: 100 MG/DL (ref 65–117)
GLUCOSE BLD STRIP.AUTO-MCNC: 110 MG/DL (ref 65–117)
GLUCOSE BLD STRIP.AUTO-MCNC: 113 MG/DL (ref 65–117)
GLUCOSE BLD STRIP.AUTO-MCNC: 129 MG/DL (ref 65–117)
GLUCOSE SERPL-MCNC: 104 MG/DL (ref 65–100)
HCT VFR BLD AUTO: 33.7 % (ref 36.6–50.3)
HGB BLD-MCNC: 10.8 G/DL (ref 12.1–17)
IMM GRANULOCYTES # BLD AUTO: 0 K/UL (ref 0–0.04)
IMM GRANULOCYTES NFR BLD AUTO: 0 % (ref 0–0.5)
LYMPHOCYTES # BLD: 2.1 K/UL (ref 0.8–3.5)
LYMPHOCYTES NFR BLD: 40 % (ref 12–49)
MAGNESIUM SERPL-MCNC: 1.4 MG/DL (ref 1.6–2.4)
MCH RBC QN AUTO: 29.1 PG (ref 26–34)
MCHC RBC AUTO-ENTMCNC: 32 G/DL (ref 30–36.5)
MCV RBC AUTO: 90.8 FL (ref 80–99)
MONOCYTES # BLD: 0.5 K/UL (ref 0–1)
MONOCYTES NFR BLD: 9 % (ref 5–13)
NEUTS SEG # BLD: 2.3 K/UL (ref 1.8–8)
NEUTS SEG NFR BLD: 42 % (ref 32–75)
NRBC # BLD: 0 K/UL (ref 0–0.01)
NRBC BLD-RTO: 0 PER 100 WBC
PLATELET # BLD AUTO: 266 K/UL (ref 150–400)
PMV BLD AUTO: 10.2 FL (ref 8.9–12.9)
POTASSIUM SERPL-SCNC: 3.2 MMOL/L (ref 3.5–5.1)
RBC # BLD AUTO: 3.71 M/UL (ref 4.1–5.7)
SERVICE CMNT-IMP: ABNORMAL
SERVICE CMNT-IMP: NORMAL
SODIUM SERPL-SCNC: 136 MMOL/L (ref 136–145)
WBC # BLD AUTO: 5.4 K/UL (ref 4.1–11.1)

## 2024-02-28 PROCEDURE — 36415 COLL VENOUS BLD VENIPUNCTURE: CPT

## 2024-02-28 PROCEDURE — 85025 COMPLETE CBC W/AUTO DIFF WBC: CPT

## 2024-02-28 PROCEDURE — 2580000003 HC RX 258: Performed by: STUDENT IN AN ORGANIZED HEALTH CARE EDUCATION/TRAINING PROGRAM

## 2024-02-28 PROCEDURE — 99233 SBSQ HOSP IP/OBS HIGH 50: CPT | Performed by: INTERNAL MEDICINE

## 2024-02-28 PROCEDURE — 6360000002 HC RX W HCPCS: Performed by: STUDENT IN AN ORGANIZED HEALTH CARE EDUCATION/TRAINING PROGRAM

## 2024-02-28 PROCEDURE — 6370000000 HC RX 637 (ALT 250 FOR IP): Performed by: INTERNAL MEDICINE

## 2024-02-28 PROCEDURE — 6370000000 HC RX 637 (ALT 250 FOR IP): Performed by: STUDENT IN AN ORGANIZED HEALTH CARE EDUCATION/TRAINING PROGRAM

## 2024-02-28 PROCEDURE — 1100000000 HC RM PRIVATE

## 2024-02-28 PROCEDURE — 6360000002 HC RX W HCPCS: Performed by: INTERNAL MEDICINE

## 2024-02-28 PROCEDURE — 80048 BASIC METABOLIC PNL TOTAL CA: CPT

## 2024-02-28 PROCEDURE — 82962 GLUCOSE BLOOD TEST: CPT

## 2024-02-28 PROCEDURE — 83735 ASSAY OF MAGNESIUM: CPT

## 2024-02-28 RX ORDER — POTASSIUM CHLORIDE 750 MG/1
40 TABLET, FILM COATED, EXTENDED RELEASE ORAL ONCE
Status: COMPLETED | OUTPATIENT
Start: 2024-02-28 | End: 2024-02-28

## 2024-02-28 RX ORDER — CASTOR OIL AND BALSAM, PERU 788; 87 MG/G; MG/G
OINTMENT TOPICAL 2 TIMES DAILY
Status: DISCONTINUED | OUTPATIENT
Start: 2024-02-28 | End: 2024-03-01 | Stop reason: HOSPADM

## 2024-02-28 RX ORDER — MAGNESIUM SULFATE IN WATER 40 MG/ML
2000 INJECTION, SOLUTION INTRAVENOUS ONCE
Status: COMPLETED | OUTPATIENT
Start: 2024-02-28 | End: 2024-02-28

## 2024-02-28 RX ADMIN — APIXABAN 5 MG: 5 TABLET, FILM COATED ORAL at 08:56

## 2024-02-28 RX ADMIN — SODIUM CHLORIDE: 9 INJECTION, SOLUTION INTRAVENOUS at 06:23

## 2024-02-28 RX ADMIN — AZITHROMYCIN 250 MG: 250 TABLET, FILM COATED ORAL at 08:57

## 2024-02-28 RX ADMIN — MAGNESIUM SULFATE HEPTAHYDRATE 2000 MG: 40 INJECTION, SOLUTION INTRAVENOUS at 10:53

## 2024-02-28 RX ADMIN — PANTOPRAZOLE SODIUM 40 MG: 40 TABLET, DELAYED RELEASE ORAL at 08:57

## 2024-02-28 RX ADMIN — ASPIRIN 81 MG: 81 TABLET, CHEWABLE ORAL at 08:56

## 2024-02-28 RX ADMIN — POTASSIUM CHLORIDE 40 MEQ: 750 TABLET, FILM COATED, EXTENDED RELEASE ORAL at 10:53

## 2024-02-28 RX ADMIN — ACETAMINOPHEN 650 MG: 325 TABLET ORAL at 20:58

## 2024-02-28 RX ADMIN — POTASSIUM CHLORIDE 40 MEQ: 1500 TABLET, EXTENDED RELEASE ORAL at 02:20

## 2024-02-28 RX ADMIN — SODIUM CHLORIDE: 9 INJECTION, SOLUTION INTRAVENOUS at 08:56

## 2024-02-28 RX ADMIN — SODIUM CHLORIDE 1000 MG: 900 INJECTION INTRAVENOUS at 16:29

## 2024-02-28 RX ADMIN — SODIUM CHLORIDE, PRESERVATIVE FREE 10 ML: 5 INJECTION INTRAVENOUS at 08:58

## 2024-02-28 RX ADMIN — SODIUM CHLORIDE, PRESERVATIVE FREE 10 ML: 5 INJECTION INTRAVENOUS at 20:50

## 2024-02-28 RX ADMIN — Medication: at 20:50

## 2024-02-28 RX ADMIN — ATORVASTATIN CALCIUM 20 MG: 20 TABLET, FILM COATED ORAL at 20:50

## 2024-02-28 RX ADMIN — Medication: at 11:56

## 2024-02-28 RX ADMIN — MAGNESIUM SULFATE HEPTAHYDRATE 2000 MG: 40 INJECTION, SOLUTION INTRAVENOUS at 02:23

## 2024-02-28 RX ADMIN — APIXABAN 5 MG: 5 TABLET, FILM COATED ORAL at 20:50

## 2024-02-28 ASSESSMENT — PAIN DESCRIPTION - ORIENTATION: ORIENTATION: ANTERIOR

## 2024-02-28 ASSESSMENT — PAIN DESCRIPTION - LOCATION: LOCATION: HEAD

## 2024-02-28 ASSESSMENT — PAIN SCALES - GENERAL
PAINLEVEL_OUTOF10: 4
PAINLEVEL_OUTOF10: 0

## 2024-02-28 ASSESSMENT — PAIN DESCRIPTION - DESCRIPTORS: DESCRIPTORS: ACHING

## 2024-02-28 NOTE — PROGRESS NOTES
Nursing contacted Nocturnist/cross cover provider and notified patient having anxiety, states AMS at baseline, states pt screaming out for his son, asking for something to help with anxiety and sleep. No other concerns reported. VSS. Ordered atarax 10mg po x1. Patient denies any further complaints or concerns. No acute distress reported. Nursing to notify Hospitalist for further/continued concerns. Will remain available overnight for further concerns if nursing/patient needs. Will defer further evaluation/management to the day shift primary care team.    Non-billable note.

## 2024-02-28 NOTE — PROGRESS NOTES
Hospitalist Progress Note    NAME:   Karson Rodriguez   : 1940   MRN: [id number]     Date/Time: 2024 3:20 PM  Patient PCP: Sybil Manuel APRN - NP    Estimated discharge date: 3/1  Barriers: Ortho stasis improvement      Assessment / Plan:    Syncope - possibly related to orthostatic hypotension vs hypovolemia   CT chest ordered-revealed similar pulmonary embolism, no increasing clot burden.  2D echo shows normal ejection fraction with the sclerosis of the aortic valve cusp with and mild stenosis of aortic valve  Recheck orthostatic vitals  Cardiology recommendations noted  Telemetry monitoring.  Evaluated by cardiology and recommended to continue anticoagulation  Consider midodrine if he is orthostatic  Per cardiology no need for pacemaker  He was recently taken off of ACE inhibitor and beta-blocker due to orthostasis       Right basilar opacity on chest x-ray  Abnormal CTA chest with unchanged bilateral lower lobe pulmonary embolism and persistent small bilateral pleural effusions and atelectasis  -No leukocytosis or fever so less likely pneumonia  -Small effusions could be related to pulmonary embolism  -He is currently on room air and saturating 98%  -Blood cultures negative at 3 days  -Check CBC and BMP in a.m. tomorrow  -Check proBNP    Hypokalemia  Hypomagnesemia  -K and Mg replaced and recheck in am        Recent history of pulmonary embolism  Repeat CT does not reveal increased clot burden  Continue PTA apixaban     Mild hypokalemia  -Potassium replaced     Sigmoid diverticulosis  Status post Whipple's procedure  Recent history of diverticulitis  Current CT scan does not show any diverticulitis.      Hypertension  Dyslipidemia  History of dementia  -Recently taken off antihypertensives due to borderline low blood pressure  -Continue PTA statin    Diabetes mellitus  -Hold home pioglitazone.  Continue insulin sliding scale with blood sugar checks             Medical Decision Making:   I

## 2024-02-28 NOTE — PROGRESS NOTES
EP/ Arrhythmia/ Cardiology Progress Note    Patient ID:  Patient: Karson Rodriguez  MRN: [id number]  Age: 83 y.o.  : 1940    2024 11:53 AM  Admit Date: 2024                  Assessment:       Possible Syncope due to Orthostatic Hypotension  Paroxysmal atrial fibrillation  History of PE  Hypertension  Type 2 Diabetes  Mild Dementia  Hypokalemia  Hyperlipidemia  History of Whipple  Acute Diverticulitis  Anemia  Full code                     Plan:        TTE with LVEF 65-70%, Mild MAC, trace MR, TR.  EKG with SR and first degree block.  Tele OK, no need for pacemaker.  May need some Midodrine if orthostatic. He was taken off ACEI and BB last admission.  On Eliquis for PE history and paroxysmal atrial fibrillation - has frequent falls, definitely not ideal.  Needs anticoagulation for two reasons, so a Watchman alone wouldn't suffice.  Agree with palliative consult.       [x]       High complexity decision making was performed in this patient    Subjective:     Karson Rodriguez denies chest pain, irregular heart beat, lower extremity edema, near-syncope, and palpitations.     Review of Systems - No melena, hematemesis, nausea, vomiting, diarrhea, dysuria, gross hematuria, suprapubic or abdominal pain, rash, headache, subjective fever, chills, wheezing, pleurisy, hemoptysis, or epistaxis.    Objective:     Physical Exam:  Temp (24hrs), Av.6 °F (37 °C), Min:98.4 °F (36.9 °C), Max:98.8 °F (37.1 °C)    Patient Vitals for the past 8 hrs:   Pulse   24 0849 (!) 112    Patient Vitals for the past 8 hrs:   Resp   24 0849 18    Patient Vitals for the past 8 hrs:   BP   24 0849 (!) 148/83      No intake or output data in the 24 hours ending 24 1153    Nondiaphoretic, not in acute distress.  MMM, no jaundice, HEENT stable.  Unlabored, clear to auscultation bilaterally, symmetric air movement.  Regular rate  suppository 650 mg  650 mg Rectal Q6H PRN    apixaban (ELIQUIS) tablet 5 mg  5 mg Oral BID    aspirin chewable tablet 81 mg  81 mg Oral Daily    pantoprazole (PROTONIX) tablet 40 mg  40 mg Oral Daily    atorvastatin (LIPITOR) tablet 20 mg  20 mg Oral Nightly    0.9 % sodium chloride infusion   IntraVENous Continuous    cefTRIAXone (ROCEPHIN) 1,000 mg in sodium chloride 0.9 % 50 mL IVPB (mini-bag)  1,000 mg IntraVENous Q24H    azithromycin (ZITHROMAX) tablet 250 mg  250 mg Oral Daily    insulin lispro (HUMALOG) injection vial 0-8 Units  0-8 Units SubCUTAneous TID WC    insulin lispro (HUMALOG) injection vial 0-4 Units  0-4 Units SubCUTAneous Nightly    glucose chewable tablet 16 g  4 tablet Oral PRN    dextrose bolus 10% 125 mL  125 mL IntraVENous PRN    Or    dextrose bolus 10% 250 mL  250 mL IntraVENous PRN    glucagon injection 1 mg  1 mg SubCUTAneous PRN    dextrose 10 % infusion   IntraVENous Continuous PRN          Yannick Pozo MD  2/28/2024

## 2024-02-28 NOTE — PROGRESS NOTES
End of Shift Note    Bedside shift change report given to KYRA Mendoza (oncoming nurse) by Lynsey Simmons RN (offgoing nurse).  Report included the following information Nurse Handoff Report, MAR, Recent Results, Med Rec Status, Cardiac Rhythm NSR, Quality Measures, and Neuro Assessment      Shift worked:  7P-7A   Shift summary and any significant changes:    Restless,agitated,irritable at the start of shift,screaming out for his son. Vital signs stable,one time order for Atarax received and administered. Patient slept subsequently for about 4 hours.Turned Q 2, incontinence care provided. K of 3.2 and Mag of 1.4 replaced ( see MAR).       Concerns for physician to address: Patient requests for a sleep pill.   Zone phone for oncoming shift:   [phone]     Patient Information  Karson Rodriugez  83 y.o.  2/25/2024 11:21 AM by Marcy Ng MD. Karson Rodriguez was admitted from Home    Problem List  Patient Active Problem List    Diagnosis Date Noted    Palliative care encounter 02/27/2024    Bilateral lower extremity pain 02/27/2024    Weakness 02/27/2024    Dehydration 02/25/2024    Syncope and collapse 02/20/2024    Severe sepsis (HCC) 12/14/2023    Altered mental status 06/12/2018    Fever 09/19/2017    Metastatic melanoma (HCC) 04/07/2016    Carcinoma of duodenum (HCC) 02/25/2015    Diabetes mellitus (HCC) 02/06/2015    Hyponatremia 11/26/2014     Past Medical History:   Diagnosis Date    Aneurysm (HCC)     aortic    Arrhythmia     A-fib    Calculus of kidney     Cancer (HCC)     Chronic kidney disease     stone    Diabetes (HCC)     Fast heart beat     Former tobacco use     Hypertension     Muscle weakness     Prostate disorder     Stomach pain        Core Measures:  CVA: No  CHF:No No  PNA:NoNo    Activity:  Activity: In bed  Number times ambulated in hallways past shift: 0  Number of times OOB to chair past shift: 0    Cardiac:   Cardiac Monitoring: Yes      Cardiac Rhythm: Sinus tachy    Access:   Current

## 2024-02-28 NOTE — PROGRESS NOTES
End of Shift Note    Bedside shift change report given to KYRA Benito (oncoming nurse) by Kelly Anderson RN (offgoing nurse).  Report included the following information Nurse Handoff Report, MAR, Recent Results, Med Rec Status, Cardiac Rhythm NSR, Quality Measures, and Neuro Assessment      Shift worked:  Days   Shift summary and any significant changes:    Q 2 turns documented   Cardiology following  IV magnesium administered  PO potassium administered  Case management following for placement needs   IV ATB administered       Concerns for physician to address:    Zone phone for oncoming shift:   [phone]     Patient Information  Karson Rodriguez  83 y.o.  2/25/2024 11:21 AM by Marcy Ng MD. Karson Rodriguez was admitted from Home    Problem List  Patient Active Problem List    Diagnosis Date Noted    Full code status 02/28/2024    Palliative care encounter 02/27/2024    Bilateral lower extremity pain 02/27/2024    Weakness 02/27/2024    Dehydration 02/25/2024    Syncope and collapse 02/20/2024    Severe sepsis (HCC) 12/14/2023    Altered mental status 06/12/2018    Fever 09/19/2017    Metastatic melanoma (HCC) 04/07/2016    Carcinoma of duodenum (HCC) 02/25/2015    Diabetes mellitus (HCC) 02/06/2015    Hyponatremia 11/26/2014     Past Medical History:   Diagnosis Date    Aneurysm (HCC)     aortic    Arrhythmia     A-fib    Calculus of kidney     Cancer (HCC)     Chronic kidney disease     stone    Diabetes (HCC)     Fast heart beat     Former tobacco use     Hypertension     Muscle weakness     Prostate disorder     Stomach pain        Core Measures:  CVA: No  CHF:No No  PNA:NoNo    Activity:  Activity: In bed  Number times ambulated in hallways past shift: 0  Number of times OOB to chair past shift: 0    Cardiac:   Cardiac Monitoring: Yes      Cardiac Rhythm: Sinus tachy    Access:   Current line(s): PIV  Central Line? No Placement date  Reason Medically Necessary   PICC LINE? No Placement date Reason Medically

## 2024-02-29 LAB
ANION GAP SERPL CALC-SCNC: 4 MMOL/L (ref 5–15)
BUN SERPL-MCNC: 6 MG/DL (ref 6–20)
BUN/CREAT SERPL: 7 (ref 12–20)
CALCIUM SERPL-MCNC: 7.7 MG/DL (ref 8.5–10.1)
CHLORIDE SERPL-SCNC: 106 MMOL/L (ref 97–108)
CO2 SERPL-SCNC: 23 MMOL/L (ref 21–32)
CREAT SERPL-MCNC: 0.83 MG/DL (ref 0.7–1.3)
GLUCOSE BLD STRIP.AUTO-MCNC: 104 MG/DL (ref 65–117)
GLUCOSE BLD STRIP.AUTO-MCNC: 106 MG/DL (ref 65–117)
GLUCOSE BLD STRIP.AUTO-MCNC: 108 MG/DL (ref 65–117)
GLUCOSE BLD STRIP.AUTO-MCNC: 112 MG/DL (ref 65–117)
GLUCOSE SERPL-MCNC: 123 MG/DL (ref 65–100)
MAGNESIUM SERPL-MCNC: 2.1 MG/DL (ref 1.6–2.4)
PHOSPHATE SERPL-MCNC: 1.8 MG/DL (ref 2.6–4.7)
POTASSIUM SERPL-SCNC: 3.6 MMOL/L (ref 3.5–5.1)
SERVICE CMNT-IMP: NORMAL
SODIUM SERPL-SCNC: 133 MMOL/L (ref 136–145)

## 2024-02-29 PROCEDURE — 97530 THERAPEUTIC ACTIVITIES: CPT

## 2024-02-29 PROCEDURE — 6370000000 HC RX 637 (ALT 250 FOR IP): Performed by: INTERNAL MEDICINE

## 2024-02-29 PROCEDURE — 84100 ASSAY OF PHOSPHORUS: CPT

## 2024-02-29 PROCEDURE — 6370000000 HC RX 637 (ALT 250 FOR IP): Performed by: NURSE PRACTITIONER

## 2024-02-29 PROCEDURE — 80048 BASIC METABOLIC PNL TOTAL CA: CPT

## 2024-02-29 PROCEDURE — 83735 ASSAY OF MAGNESIUM: CPT

## 2024-02-29 PROCEDURE — 6370000000 HC RX 637 (ALT 250 FOR IP): Performed by: STUDENT IN AN ORGANIZED HEALTH CARE EDUCATION/TRAINING PROGRAM

## 2024-02-29 PROCEDURE — 1100000000 HC RM PRIVATE

## 2024-02-29 PROCEDURE — 6360000002 HC RX W HCPCS: Performed by: STUDENT IN AN ORGANIZED HEALTH CARE EDUCATION/TRAINING PROGRAM

## 2024-02-29 PROCEDURE — 82962 GLUCOSE BLOOD TEST: CPT

## 2024-02-29 PROCEDURE — 36415 COLL VENOUS BLD VENIPUNCTURE: CPT

## 2024-02-29 PROCEDURE — 2580000003 HC RX 258: Performed by: STUDENT IN AN ORGANIZED HEALTH CARE EDUCATION/TRAINING PROGRAM

## 2024-02-29 RX ORDER — LANOLIN ALCOHOL/MO/W.PET/CERES
3 CREAM (GRAM) TOPICAL NIGHTLY PRN
Status: DISCONTINUED | OUTPATIENT
Start: 2024-02-29 | End: 2024-03-01 | Stop reason: HOSPADM

## 2024-02-29 RX ADMIN — Medication: at 20:34

## 2024-02-29 RX ADMIN — SODIUM CHLORIDE 1000 MG: 900 INJECTION INTRAVENOUS at 16:36

## 2024-02-29 RX ADMIN — SODIUM CHLORIDE, PRESERVATIVE FREE 10 ML: 5 INJECTION INTRAVENOUS at 08:55

## 2024-02-29 RX ADMIN — APIXABAN 5 MG: 5 TABLET, FILM COATED ORAL at 08:55

## 2024-02-29 RX ADMIN — ATORVASTATIN CALCIUM 20 MG: 20 TABLET, FILM COATED ORAL at 20:34

## 2024-02-29 RX ADMIN — POTASSIUM & SODIUM PHOSPHATES POWDER PACK 280-160-250 MG 500 MG: 280-160-250 PACK at 08:53

## 2024-02-29 RX ADMIN — Medication: at 08:53

## 2024-02-29 RX ADMIN — APIXABAN 5 MG: 5 TABLET, FILM COATED ORAL at 20:34

## 2024-02-29 RX ADMIN — PANTOPRAZOLE SODIUM 40 MG: 40 TABLET, DELAYED RELEASE ORAL at 08:55

## 2024-02-29 RX ADMIN — ASPIRIN 81 MG: 81 TABLET, CHEWABLE ORAL at 08:54

## 2024-02-29 RX ADMIN — MELATONIN 3 MG: at 23:15

## 2024-02-29 RX ADMIN — SODIUM CHLORIDE: 9 INJECTION, SOLUTION INTRAVENOUS at 04:27

## 2024-02-29 RX ADMIN — SODIUM CHLORIDE, PRESERVATIVE FREE 10 ML: 5 INJECTION INTRAVENOUS at 20:34

## 2024-02-29 RX ADMIN — AZITHROMYCIN 250 MG: 250 TABLET, FILM COATED ORAL at 08:54

## 2024-02-29 RX ADMIN — ACETAMINOPHEN 650 MG: 325 TABLET ORAL at 16:26

## 2024-02-29 ASSESSMENT — PAIN SCALES - GENERAL
PAINLEVEL_OUTOF10: 0
PAINLEVEL_OUTOF10: 0

## 2024-02-29 NOTE — PROGRESS NOTES
End of Shift Note    Bedside shift change report given to KYRA Ruvalcaba (oncoming nurse) by Tiny Moore RN (offgoing nurse).  Report included the following information Nurse Handoff Report, MAR, Recent Results, Med Rec Status, Cardiac Rhythm NSR, Quality Measures, and Neuro Assessment      Shift worked: NIGHT   Shift summary and any significant changes:    No acute changes, q2 turns done,heelz up off the bed.     Case management following for placement. Scheduled labs done and medicine given,       Concerns for physician to address:    Zone phone for oncoming shift:   [phone]     Patient Information  Karson Rodriguez  83 y.o.  2/25/2024 11:21 AM by Marcy Ng MD. Karson Rodriguez was admitted from Home    Problem List  Patient Active Problem List    Diagnosis Date Noted    Full code status 02/28/2024    Palliative care encounter 02/27/2024    Bilateral lower extremity pain 02/27/2024    Weakness 02/27/2024    Dehydration 02/25/2024    Syncope and collapse 02/20/2024    Severe sepsis (HCC) 12/14/2023    Altered mental status 06/12/2018    Fever 09/19/2017    Metastatic melanoma (HCC) 04/07/2016    Carcinoma of duodenum (HCC) 02/25/2015    Diabetes mellitus (HCC) 02/06/2015    Hyponatremia 11/26/2014     Past Medical History:   Diagnosis Date    Aneurysm (HCC)     aortic    Arrhythmia     A-fib    Calculus of kidney     Cancer (HCC)     Chronic kidney disease     stone    Diabetes (HCC)     Fast heart beat     Former tobacco use     Hypertension     Muscle weakness     Prostate disorder     Stomach pain        Core Measures:  CVA: No  CHF:No No  PNA:NoNo    Activity:  Activity: In bed  Number times ambulated in hallways past shift: 0  Number of times OOB to chair past shift: 0    Cardiac:   Cardiac Monitoring: Yes      Cardiac Rhythm: Sinus tachy    Access:   Current line(s): PIV  Central Line? No Placement date  Reason Medically Necessary   PICC LINE? No Placement date Reason Medically Necessary

## 2024-02-29 NOTE — PROGRESS NOTES
EP/ Arrhythmia/ Cardiology Progress Note    Patient ID:  Patient: Karson Rodriguez  MRN: [id number]  Age: 83 y.o.  : 1940    2024 10:42 AM  Admit Date: 2024                  Assessment:       Possible Syncope due to Orthostatic Hypotension  Paroxysmal atrial fibrillation  History of PE  Hypertension  Type 2 Diabetes  Mild Dementia  Hypokalemia  Hyperlipidemia  History of Whipple   Acute Diverticulitis   Anemia   Full code                     Plan:        TTE with LVEF 65-70%, Mild MAC, trace MR, TR.  EKG with SR and first degree block.  Tele OK, no need for pacemaker.  May need some Midodrine if orthostatic. He was taken off ACEI and BB last admission.  On Eliquis for PE history and paroxysmal atrial fibrillation - has frequent falls, definitely not ideal.  Needs anticoagulation for two reasons, so a Watchman alone wouldn't suffice.  If needed to lower bleeding risk, can stop aspirin  Agree with palliative consult.     update:  Continue current plan.  He's working with PT/OT here.  Rec for PT/OT 5 days/week on discharge noted.     [x]       High complexity decision making was performed in this patient    Subjective:     Karson Rodriguez denies chest pain, irregular heart beat, lower extremity edema, near-syncope, and palpitations.     Review of Systems - No melena, hematemesis, nausea, vomiting, diarrhea, dysuria, gross hematuria, suprapubic or abdominal pain, rash, headache, subjective fever, chills, wheezing, pleurisy, hemoptysis, or epistaxis.    Objective:     Physical Exam:  Temp (24hrs), Av.3 °F (36.8 °C), Min:98.2 °F (36.8 °C), Max:98.4 °F (36.9 °C)    Patient Vitals for the past 8 hrs:   Pulse   24 0817 92    Patient Vitals for the past 8 hrs:   Resp   24 0817 18    Patient Vitals for the past 8 hrs:   BP   24 0817 (!) 170/91        Intake/Output Summary (Last 24 hours) at 2024

## 2024-02-29 NOTE — PROGRESS NOTES
Hospitalist Progress Note    NAME:   Karson Rodriguez   : 1940   MRN: [id number]     Date/Time: 2024 3:37 PM  Patient PCP: Sybil Manuel APRN - [facility]    Estimated discharge date: 3/1  Barriers: Improvement of orthostatic hypotension, phosphorus, placement      Assessment / Plan:    Syncope - possibly related to orthostatic hypotension vs hypovolemia   Repeat CT chest shows persistent pulmonary embolism from last week with no worsening  2D echo shows normal ejection fraction with the sclerosis of the aortic valve cusp with and mild stenosis of aortic valve  Pending orthostatic vitals  Cardiology recommendations noted  Telemetry monitoring.  Evaluated by cardiology and recommended to continue anticoagulation  Consider midodrine if he is orthostatic  Per cardiology no need for pacemaker  He was recently taken off of ACE inhibitor and beta-blocker due to orthostasis       Right basilar opacity on chest x-ray  Abnormal CTA chest with unchanged bilateral lower lobe pulmonary embolism and persistent small bilateral pleural effusions and atelectasis  -No leukocytosis or fever so less likely pneumonia and may be URTI  -Small effusions could be related to pulmonary embolism  -He is currently on room air and saturating 98%  -Blood cultures negative   -S/p ceftriaxone and Zithromax.  Will stop after 5 days.      Hypokalemia  Hypomagnesemia  -K and Mg normal today    Hypophosphatemia  -Phosphorus replaced.  Recheck in a.m.       Recent history of pulmonary embolism  Repeat CT does not reveal increased clot burden  Continue PTA apixaban        Sigmoid diverticulosis  Status post Whipple's procedure  Recent history of diverticulitis  Current CT scan does not show any diverticulitis.      Hypertension  Dyslipidemia  History of dementia  -Recently taken off antihypertensives due to borderline low blood pressure  -Continue PTA statin    Diabetes mellitus  -Hold home pioglitazone.  Continue insulin sliding scale with

## 2024-02-29 NOTE — PROGRESS NOTES
End of Shift Note    Bedside shift change report given to KYRA Benito (oncoming nurse) by Jordon Denney RN (offgoing nurse).  Report included the following information Nurse Handoff Report, MAR, Recent Results, Med Rec Status, Cardiac Rhythm NSR, Quality Measures, and Neuro Assessment      Shift worked: days   Shift summary and any significant changes:    No acute changes, q2 turns done,heelz up off the bed. Possible D/C to rehab tomorrow. Abx given.          Concerns for physician to address: none   Zone phone for oncoming shift:   [phone]     Patient Information  Karson Rodriguez  83 y.o.  2/25/2024 11:21 AM by Marcy Ng MD. Karson Rodriguez was admitted from Home    Problem List  Patient Active Problem List    Diagnosis Date Noted    Full code status 02/28/2024    Palliative care encounter 02/27/2024    Bilateral lower extremity pain 02/27/2024    Weakness 02/27/2024    Dehydration 02/25/2024    Syncope and collapse 02/20/2024    Severe sepsis (HCC) 12/14/2023    Altered mental status 06/12/2018    Fever 09/19/2017    Metastatic melanoma (HCC) 04/07/2016    Carcinoma of duodenum (HCC) 02/25/2015    Diabetes mellitus (HCC) 02/06/2015    Hyponatremia 11/26/2014     Past Medical History:   Diagnosis Date    Aneurysm (HCC)     aortic    Arrhythmia     A-fib    Calculus of kidney     Cancer (HCC)     Chronic kidney disease     stone    Diabetes (HCC)     Fast heart beat     Former tobacco use     Hypertension     Muscle weakness     Prostate disorder     Stomach pain        Core Measures:  CVA: No  CHF:No No  PNA:NoNo    Activity:  Activity: In bed  Number times ambulated in hallways past shift: 0  Number of times OOB to chair past shift: 0    Cardiac:   Cardiac Monitoring: Yes      Cardiac Rhythm: Sinus tachy    Access:   Current line(s): PIV  Central Line? No Placement date  Reason Medically Necessary   PICC LINE? No Placement date Reason Medically Necessary     Genitourinary:   Urinary status: voiding , incontinent,

## 2024-02-29 NOTE — CARE COORDINATION
.cmtTransition of Care Plan:    RUR: 21%  Prior Level of Functioning: Needs assistance  Disposition: SNF  If SNF or IPR: Date FOC offered: 2/29/24  Date FOC received: 2/29/24  Accepting facility: [facility]  Date authorization started with reference number: TBD  Date authorization received and expires: [facility]  Follow up appointments: Defer to SNF  DME needed: Defer to SNF  Transportation at discharge: BLS vs Family  IM/IMM Medicare/ letter given: Will need 2nd IMM upon discharge  Is patient a  and connected with VA? No   If yes, was  transfer form completed and VA notified? N/A  Caregiver Contact: [facility] staff  Discharge Caregiver contacted prior to discharge? Yes  Care Conference needed? Not at this time  Barriers to discharge:  Placement    CM attempted to call pt's son Ronald Rodriguez [phone] to discuss pt's discharge plan and FOC for SNF referrals. Ronald did not answer the phone. LUIS CARLOS left a VM requesting a call back.    LUIS CARLOS received a message from [facility] representative, Alia, [phone]. Alia requested an update on pt's current discharge plan. LUIS CARLOS called Alia back and left a VM updating her on pt's current discharge plan.    3:32 PM: CM received a call back from pt's son, Ronald. [facility]. LUIS CARLOS sent a referral to [facility] for pt via [email or web address].    If [facility] is not able to accept, FOC list should be emailed to Ronald at [email or web address].     Ronald told CM that pt was [facility]. Pt was at [facility] for about 5 weeks. Ronald is not sure how many skilled nursing Medicare days that pt has left. Ronald would like some clarification regarding how many SNF days pt is eligible for.    4:23 PM: LUIS CARLOS spoke with [facility] and Rehab liaison, Jerardo, who confirmed that pt has been approved for [facility]. They will most

## 2024-02-29 NOTE — PLAN OF CARE
Problem: Physical Therapy - Adult  Goal: By Discharge: Performs mobility at highest level of function for planned discharge setting.  See evaluation for individualized goals.  Description: Description: FUNCTIONAL STATUS PRIOR TO ADMISSION: Per chart, as pt is poor historian, patient ambulated with assist with use of RW but also has a wc if needed. Pt was admitted from SNF after recently d/c from [facility] on 2/24 and readmitted 2/25.    HOME SUPPORT PRIOR TO ADMISSION: Patient resides at [facility] with assist for mobility and ADLs. Pt admitted from SNF after being there for what seems like ~24 hours      Physical Therapy Goals  Initiated 2/27/2024  1.  Patient will move from supine to sit and sit to supine and roll side to side in bed with moderate assistance within 7 day(s).    2.  Patient will perform sit to stand with maximal assistance within 7 day(s).  3.  Patient will transfer from bed to chair and chair to bed with maximal assistance using the least restrictive device within 7 day(s).  4.  Patient will ambulate with maximal assistance for 3 feet with the least restrictive device within 7 day(s).       Outcome: Not Progressing  PHYSICAL THERAPY TREATMENT    Patient: Karson Rodriguez (83 y.o. male)  Date: 2/29/2024  Diagnosis: Dehydration [E86.0]  Pleural effusion [J90]  Community acquired pneumonia, unspecified laterality [J18.9] Dehydration      Precautions: Fall Risk                      ASSESSMENT:  Patient continues to benefit from skilled PT services and is not progressing towards goals, remaining limited by baseline dementia, agitation, self-limiting behaviors, generalized weakness, impaired motivation, and impaired activity tolerance. Pt oriented to self only, easily becoming agitated with mobility attempts, and swinging at therapist when attempting to assess BP. Pt assumed seated position EOB w/ maxAx2. Minor improvement in sitting balance however close SB/CGA maintained. Pt maintained seated position EOB x5-6  minutes before adamantly refusing further mobility, requesting return to supine position in bed. Pt most appropriate to return to SNF/LTC at discharge.          PLAN:  Patient continues to benefit from skilled intervention to address the above impairments.  Continue treatment per established plan of care.    Recommendation for discharge: (in order for the patient to meet his/her long term goals): Therapy up to 5 days/week in Skilled nursing facility    Other factors to consider for discharge:  from LTC? Dementia, falls risk, caregiver burden    IF patient discharges home will need the following DME: patient owns DME required for discharge       SUBJECTIVE:   Patient stated, \"What do you want???\"    OBJECTIVE DATA SUMMARY:   Critical Behavior:  Orientation  Overall Orientation Status: Impaired  Orientation Level: Oriented to person;Oriented to place;Disoriented to time;Disoriented to situation  Cognition  Overall Cognitive Status: Exceptions  Arousal/Alertness: Delayed responses to stimuli  Following Commands: Follows one step commands with repetition;Follows one step commands with increased time  Attention Span: Difficulty attending to directions  Memory: Decreased recall of recent events  Safety Judgement: Decreased awareness of need for assistance;Decreased awareness of need for safety  Problem Solving: Decreased awareness of errors  Insights: Decreased awareness of deficits  Initiation: Requires cues for some  Sequencing: Requires cues for some    Functional Mobility Training:  Bed Mobility:  Bed Mobility Training  Bed Mobility Training: Yes  Rolling: Maximum assistance  Supine to Sit: Maximum assistance;Assist X2  Sit to Supine: Maximum assistance;Assist X2  Scooting: Maximum assistance  Transfers:  Transfer Training  Transfer Training: No  Balance:  Balance  Sitting: Impaired  Sitting - Static: Good (unsupported)  Sitting - Dynamic: Fair (occasional)  Standing:  (not tested)   Ambulation/Gait Training:   Did

## 2024-02-29 NOTE — PLAN OF CARE
Problem: Occupational Therapy - Adult  Goal: By Discharge: Performs self-care activities at highest level of function for planned discharge setting.  See evaluation for individualized goals.  Description: FUNCTIONAL STATUS PRIOR TO ADMISSION:  Patient unreliable historian. Per chart review, patient admitted from SNF rehab.    ,  ,  ,  ,  ,  ,  ,  ,  ,  ,       HOME SUPPORT: Patient lived in [facility].    Occupational Therapy Goals:  Initiated 2/27/2024  1.  Patient will perform grooming sitting EOB with Contact Guard Assist within 7 day(s).  2.  Patient will perform upper body dressing with Minimal Assist within 7 day(s).  3.  Patient will perform supine > sit to prepare for OOB ADLs with Moderate Assist within 7 day(s).  4.  Patient will perform toilet transfers to [facility] with Maximal Assist  within 7 day(s).  5.  Patient will perform all aspects of toileting with Moderate Assist within 7 day(s).  6.  Patient will participate in upper extremity therapeutic exercise/activities with Stand by Assist for 3 minutes within 7 day(s).    Outcome: Not Progressing   OCCUPATIONAL THERAPY TREATMENT  Patient: Karson Rodriguez (83 y.o. male)  Date: 2/29/2024  Primary Diagnosis: Dehydration [E86.0]  Pleural effusion [J90]  Community acquired pneumonia, unspecified laterality [J18.9]       Precautions: Fall Risk                Chart, occupational therapy assessment, plan of care, and goals were reviewed.    ASSESSMENT  Patient continues to benefit from skilled OT services and is not progressing towards goals. Patient received semisupine in bed and agreeable for therapy with increased encouragement. Patient required total assist to don socks while semisupine. He required max assist x2 for bed mobility and demonstrated good to fair sitting balance. He tolerated sitting EOB for approx 6 mins and attempted to participate on BLE exercises with PT present. Patient remains very self limiting and difficult to redirect secondary to cognition and      Balance:  Standing:  (not tested)  Balance  Sitting: Impaired  Sitting - Static: Good (unsupported)  Sitting - Dynamic: Fair (occasional)  Standing:  (not tested)      ADL Intervention:    LE Dressing: Dependent/Total     Pain Rating:  Patient c/o back pain   Pain Intervention(s):   repositioning      Activity Tolerance:   Poor  Please refer to the flowsheet for vital signs taken during this treatment.    After treatment:   Patient left in no apparent distress in bed, Call bell within reach, Bed/ chair alarm activated, and Side rails x3    COMMUNICATION/EDUCATION:   The patient's plan of care was discussed with: physical therapist and registered nurse    Patient Education  Education Given To: Patient  Education Provided: Role of Therapy;Plan of Care  Education Method: Verbal  Barriers to Learning: Cognition  Education Outcome: Continued education needed    Thank you for this referral.  Mitra Fajardo OT  Minutes: 14

## 2024-03-01 VITALS
WEIGHT: 222 LBS | DIASTOLIC BLOOD PRESSURE: 88 MMHG | OXYGEN SATURATION: 98 % | SYSTOLIC BLOOD PRESSURE: 143 MMHG | HEIGHT: 70 IN | HEART RATE: 102 BPM | BODY MASS INDEX: 31.78 KG/M2 | TEMPERATURE: 98.4 F | RESPIRATION RATE: 18 BRPM

## 2024-03-01 LAB
ANION GAP SERPL CALC-SCNC: 6 MMOL/L (ref 5–15)
BASOPHILS # BLD: 0.1 K/UL (ref 0–0.1)
BASOPHILS NFR BLD: 1 % (ref 0–1)
BUN SERPL-MCNC: 7 MG/DL (ref 6–20)
BUN/CREAT SERPL: 9 (ref 12–20)
CALCIUM SERPL-MCNC: 7.9 MG/DL (ref 8.5–10.1)
CHLORIDE SERPL-SCNC: 103 MMOL/L (ref 97–108)
CO2 SERPL-SCNC: 23 MMOL/L (ref 21–32)
CREAT SERPL-MCNC: 0.79 MG/DL (ref 0.7–1.3)
DIFFERENTIAL METHOD BLD: ABNORMAL
EOSINOPHIL # BLD: 0.5 K/UL (ref 0–0.4)
EOSINOPHIL NFR BLD: 12 % (ref 0–7)
ERYTHROCYTE [DISTWIDTH] IN BLOOD BY AUTOMATED COUNT: 15.9 % (ref 11.5–14.5)
GLUCOSE BLD STRIP.AUTO-MCNC: 138 MG/DL (ref 65–117)
GLUCOSE BLD STRIP.AUTO-MCNC: 78 MG/DL (ref 65–117)
GLUCOSE SERPL-MCNC: 91 MG/DL (ref 65–100)
HCT VFR BLD AUTO: 35.3 % (ref 36.6–50.3)
HGB BLD-MCNC: 11.2 G/DL (ref 12.1–17)
IMM GRANULOCYTES # BLD AUTO: 0 K/UL (ref 0–0.04)
IMM GRANULOCYTES NFR BLD AUTO: 0 % (ref 0–0.5)
LYMPHOCYTES # BLD: 1.7 K/UL (ref 0.8–3.5)
LYMPHOCYTES NFR BLD: 44 % (ref 12–49)
MCH RBC QN AUTO: 29.1 PG (ref 26–34)
MCHC RBC AUTO-ENTMCNC: 31.7 G/DL (ref 30–36.5)
MCV RBC AUTO: 91.7 FL (ref 80–99)
MONOCYTES # BLD: 0.5 K/UL (ref 0–1)
MONOCYTES NFR BLD: 12 % (ref 5–13)
NEUTS SEG # BLD: 1.2 K/UL (ref 1.8–8)
NEUTS SEG NFR BLD: 31 % (ref 32–75)
NRBC # BLD: 0 K/UL (ref 0–0.01)
NRBC BLD-RTO: 0 PER 100 WBC
PHOSPHATE SERPL-MCNC: 2.5 MG/DL (ref 2.6–4.7)
PLATELET # BLD AUTO: 288 K/UL (ref 150–400)
PMV BLD AUTO: 9.8 FL (ref 8.9–12.9)
POTASSIUM SERPL-SCNC: 3.2 MMOL/L (ref 3.5–5.1)
RBC # BLD AUTO: 3.85 M/UL (ref 4.1–5.7)
SERVICE CMNT-IMP: ABNORMAL
SERVICE CMNT-IMP: NORMAL
SODIUM SERPL-SCNC: 132 MMOL/L (ref 136–145)
WBC # BLD AUTO: 3.9 K/UL (ref 4.1–11.1)

## 2024-03-01 PROCEDURE — 2580000003 HC RX 258: Performed by: STUDENT IN AN ORGANIZED HEALTH CARE EDUCATION/TRAINING PROGRAM

## 2024-03-01 PROCEDURE — 80048 BASIC METABOLIC PNL TOTAL CA: CPT

## 2024-03-01 PROCEDURE — 85025 COMPLETE CBC W/AUTO DIFF WBC: CPT

## 2024-03-01 PROCEDURE — 2500000003 HC RX 250 WO HCPCS: Performed by: INTERNAL MEDICINE

## 2024-03-01 PROCEDURE — 2580000003 HC RX 258: Performed by: INTERNAL MEDICINE

## 2024-03-01 PROCEDURE — 82962 GLUCOSE BLOOD TEST: CPT

## 2024-03-01 PROCEDURE — 84100 ASSAY OF PHOSPHORUS: CPT

## 2024-03-01 PROCEDURE — 6370000000 HC RX 637 (ALT 250 FOR IP): Performed by: STUDENT IN AN ORGANIZED HEALTH CARE EDUCATION/TRAINING PROGRAM

## 2024-03-01 PROCEDURE — 36415 COLL VENOUS BLD VENIPUNCTURE: CPT

## 2024-03-01 PROCEDURE — 6370000000 HC RX 637 (ALT 250 FOR IP): Performed by: INTERNAL MEDICINE

## 2024-03-01 RX ORDER — POTASSIUM CHLORIDE 750 MG/1
40 TABLET, FILM COATED, EXTENDED RELEASE ORAL ONCE
Status: COMPLETED | OUTPATIENT
Start: 2024-03-01 | End: 2024-03-01

## 2024-03-01 RX ADMIN — SODIUM CHLORIDE, PRESERVATIVE FREE 10 ML: 5 INJECTION INTRAVENOUS at 09:22

## 2024-03-01 RX ADMIN — ASPIRIN 81 MG: 81 TABLET, CHEWABLE ORAL at 09:20

## 2024-03-01 RX ADMIN — APIXABAN 5 MG: 5 TABLET, FILM COATED ORAL at 09:21

## 2024-03-01 RX ADMIN — SODIUM PHOSPHATE, MONOBASIC, MONOHYDRATE AND SODIUM PHOSPHATE, DIBASIC, ANHYDROUS 20 MMOL: 142; 276 INJECTION, SOLUTION INTRAVENOUS at 11:12

## 2024-03-01 RX ADMIN — POTASSIUM CHLORIDE 40 MEQ: 750 TABLET, FILM COATED, EXTENDED RELEASE ORAL at 09:21

## 2024-03-01 RX ADMIN — Medication: at 09:22

## 2024-03-01 RX ADMIN — PANTOPRAZOLE SODIUM 40 MG: 40 TABLET, DELAYED RELEASE ORAL at 09:20

## 2024-03-01 ASSESSMENT — PAIN SCALES - GENERAL: PAINLEVEL_OUTOF10: 0

## 2024-03-01 NOTE — PROGRESS NOTES
Called [facility] and gave report with nurse Dixon. The nurse had no questions of the time.     Patient discharged being transported with [facility].

## 2024-03-01 NOTE — ACP (ADVANCE CARE PLANNING)
Advance Care Planning     General Advance Care Planning (ACP) Conversation    Date of Conversation: 3/1/2024  Conducted with:  Healthcare Decision Maker: Named in Advance Directive or Healthcare Power of  (name) Ronald Moralesjose.    Healthcare Decision Maker:    Primary Decision Maker: Ronald Rodriguez - Child - [phone]    Secondary Decision Maker: Santos Farr Jr. - Child - [phone]    Supplemental (Other) Decision Maker: Karson Rodriguez \"Noah\" - Child - [phone]  Click here to complete Healthcare Decision Makers including selection of the Healthcare Decision Maker Relationship (ie \"Primary\").   Today we documented Decision Maker(s) consistent with ACP documents on file.    Content/Action Overview:  Has ACP document(s) on file - reflects the patient's care preferences  Reviewed DNR/DNI and patient elects Full Code (Attempt Resuscitation)    Thank you for including Palliative team in the care of Mr. Karson Rodriguez.    Adriana Jacobs LCSW  Palliative Medicine [phone]  Rosario Jacobs LCSW

## 2024-03-01 NOTE — CARE COORDINATION
CM needs completed - [facility] via [facility] @ 3:30 PM    Transition of Care Plan:    RUR: 22% - \"high risk\"  Prior Level of Functioning: Pt ambulated with assistance & use of RW; pt also has access to wheelchair  Disposition: SNF - [facility]   Report: [phone]  Room: 407 A  Date FOC offered: 2/29/24  Date FOC received: 2/29/24  Accepting facility: [facility]  Follow up appointments: PCP & specialist as indicated  DME needed: Defer to SNF for DME needs  Transportation at discharge: [facility] proactively secured for 3:30 PM  IM/IMM Medicare/ letter given: 2nd IM reviewed with son via telephone 3/1/24 secondary to pt's confusion; verbal consent provided  Is patient a  and connected with [facility]? N/A  Caregiver Contact: Pt's son (Ronald Rodriguez: [phone])  Discharge Caregiver contacted prior to discharge? Son contacted the day of d/c (3/1/24)  Care Conference needed? N/A  Barriers to discharge: No, BRII 3/1/24    Update - 1:20 PM: CM contacted pt's son, introduced role, & provided overview of the d/c plan; son agreeable. Son aware of transport secured for 3:30 PM. Son also provided with room assignment for reference. Son requested to speak with the MD prior to d/c, as he has questions regarding pt's clinical status. CM to relay message to MD. No additional questions/concerns identified.    Update - 12:28 PM: Room assignment at [facility] confirmed; reflected above. Jerardo reported the pt's room will be ready at 4:00 PM. Jerardo informed of transport secured for 3:30 PM; Jerardo confirmed 3:30 PM pick-up time will suffice. CM will contact pt's son (Ronald Rodriguez: [phone]) to provide overview of the d/c plan.    Update - 11:45 AM: Jerardo is coordinating with facility's admission director (Bro) to confirm bed will be available this afternoon. [facility] proactively secured for 3:30 PM. CM will update/cancel transport as

## 2024-03-01 NOTE — PROGRESS NOTES
PLACE OF SERVICE:  [facility] [address] [place] [address]    H & P      Chief Complaint: Syncope possibly secondary to postural hypotension  Abnormal CTA of the chest with unchanged bilateral lower lobe pulmonary embolism and small bilateral pleural effusions and atelectasis  Hypokalemia  Hypomagnesemia  Hypophosphatemia    HPI : Karson Rodriguez is a 83 y.o. male with history of dementia, atrial fibrillation, recent pulmonary embolism on apixaban, hypertension was brought to ED for evaluation after patient was found to have passed out in his wheelchair.  911 was called.  During evaluation his blood pressure was found to be very low around 80/60.  He was given IV fluid bolus.  In the ED patient received ceftriaxone and Flomax he also received normal saline bolus and his condition improved.  He was admitted to hospital.  Repeat CT of the chest showed persistent pulmonary embolism from last week with no worsening.  2D echo showed normal ejection fraction with aortic sclerosis and mild stenosis of aortic valve.  Patient received IV resuscitation.  Evaluated by cardiology and recommended to continue anticoagulation.  Also consider midodrine patient is orthostatic.  Per cardiology no need for pacemaker.  He was recently taken off ACE inhibitor's and beta-blockers due to orthostasis.  Before discharge patient was checked and not orthostatic.  Blood pressure was pretty running on the high side.  He was seen by PT OT who recommended skilled rehab or SNF.  He is admitted to [facility] for skilled rehab    PMH: Atrial fibrillation, recent pulmonary embolism, aortic aneurysm, hypertension, dementia, diabetes    ROS:   The following system review was negative:  Constitutional; Respiratory; Cardiovascular; Genitourinary; Gastrointestinal; Psychiatric; Ear-Nose-Throat; Musculoskeletal; Neurologic; Endocrine; Hematologic; Skin; Eyes; denies any    Medications: Reviewed in EMR

## 2024-03-01 NOTE — PALLIATIVE CARE DISCHARGE
Goals of Care/Treatment Preferences    The Palliative Medicine team was consulted as part of your/your loved one's care in the hospital. Our team is a supportive service; we strive to relieve suffering and improve quality of life.    We reviewed advance care planning information, which includes the following:    Primary Decision Maker: Ronald Rodriguez - Child - [phone]    Secondary Decision Maker: Santos Farr Jr. - Child    Patient/Health Care Proxy Stated Goals: Recovery from acute illness    We reviewed / discussed your code status as:   Code Status: Full Code     “Full Code” means perform CPR in the event of cardiac arrest.      “DNR” means do NOT perform CPR in the event of cardiac arrest.      “Partial Code” means you have specific preferences, please discuss with your healthcare team.      “No Order” means this issue was not addressed / resolved during your stay    Medical Interventions: Full interventions      Because of the importance of this information, we are providing you with a printed copy to share with other healthcare providers after this hospitalization is complete.    Thank you for including Palliative team in the care of Mr. Karson Rodriguez.    Adriana Jacobs, [facility]  Palliative Medicine [phone])

## 2024-03-01 NOTE — PLAN OF CARE
Problem: Safety - Adult  Goal: Free from fall injury  2/29/2024 2241 by Anabelle Moore RN  Outcome: Progressing  2/29/2024 1026 by Jordon Denney RN  Outcome: Progressing     Problem: Discharge Planning  Goal: Discharge to home or other facility with appropriate resources  2/29/2024 2241 by Anabelle Moore RN  Outcome: Progressing  2/29/2024 1026 by Jordon Denney RN  Outcome: Progressing     Problem: Skin/Tissue Integrity  Goal: Absence of new skin breakdown  Description: 1.  Monitor for areas of redness and/or skin breakdown  2.  Assess vascular access sites hourly  3.  Every 4-6 hours minimum:  Change oxygen saturation probe site  4.  Every 4-6 hours:  If on nasal continuous positive airway pressure, respiratory therapy assess nares and determine need for appliance change or resting period.  2/29/2024 2241 by Anabelle Moore RN  Outcome: Progressing  2/29/2024 1026 by Jordon Denney RN  Outcome: Progressing     Problem: ABCDS Injury Assessment  Goal: Absence of physical injury  2/29/2024 2241 by Anabelle Moore RN  Outcome: Progressing  2/29/2024 1026 by Jordon Denney RN  Outcome: Progressing     Problem: Pain  Goal: Verbalizes/displays adequate comfort level or baseline comfort level  2/29/2024 2241 by Anabelle Moore RN  Outcome: Progressing  2/29/2024 1026 by Jordon Denney RN  Outcome: Progressing     Problem: Chronic Conditions and Co-morbidities  Goal: Patient's chronic conditions and co-morbidity symptoms are monitored and maintained or improved  2/29/2024 2241 by Anabelle Moore RN  Outcome: Progressing  2/29/2024 1026 by Jordon Denney RN  Outcome: Progressing     Problem: Physical Therapy - Adult  Goal: By Discharge: Performs mobility at highest level of function for planned discharge setting.  See evaluation for individualized goals.  Description: Description: FUNCTIONAL STATUS PRIOR TO ADMISSION: Per chart, as pt is poor historian, patient ambulated with assist with use of RW but

## 2024-03-01 NOTE — PROGRESS NOTES
End of Shift Note    Bedside shift change report given to Leno RAE (oncoming nurse) by Tiny Moore RN (offgoing nurse).  Report included the following information Nurse Handoff Report, MAR, Recent Results, Med Rec Status, Cardiac Rhythm NSR, Quality Measures, and Neuro Assessment      Shift worked: NIGHT   Shift summary and any significant changes:    No acute changes, q2 turns done,heelz up off the bed.     Patient given melatonin last night to sleep.   His heart rhythm has been tachycardic over the night.    Patient placement at [facility]  almost finalised.  Bed available on 3/1.  As per the  and family.       Concerns for physician to address:    Zone phone for oncoming shift:   [id number]     Patient Information  Karson Rodriguez  83 y.o.  2/25/2024 11:21 AM by Marcy Ng MD. Karson Rodriguez was admitted from Home    Problem List  Patient Active Problem List    Diagnosis Date Noted    Full code status 02/28/2024    Palliative care encounter 02/27/2024    Bilateral lower extremity pain 02/27/2024    Weakness 02/27/2024    Dehydration 02/25/2024    Syncope and collapse 02/20/2024    Severe sepsis (HCC) 12/14/2023    Altered mental status 06/12/2018    Fever 09/19/2017    Metastatic melanoma (HCC) 04/07/2016    Carcinoma of duodenum (HCC) 02/25/2015    Diabetes mellitus (HCC) 02/06/2015    Hyponatremia 11/26/2014     Past Medical History:   Diagnosis Date    Aneurysm (HCC)     aortic    Arrhythmia     A-fib    Calculus of kidney     Cancer (HCC)     Chronic kidney disease     stone    Diabetes (HCC)     Fast heart beat     Former tobacco use     Hypertension     Muscle weakness     Prostate disorder     Stomach pain        Core Measures:  CVA: No  CHF:No No  PNA:NoNo    Activity:  Activity: In bed  Number times ambulated in hallways past shift: 0  Number of times OOB to chair past shift: 0    Cardiac:   Cardiac Monitoring: Yes      Cardiac Rhythm: Sinus tachy    Access:

## 2024-03-01 NOTE — DISCHARGE SUMMARY
Discharge Summary    Name: Karson Rodriguez  [id number]  YOB: 1940 (Age: 83 y.o.)   Date of Admission: 2/25/2024  Date of Discharge: 3/1/2024  Attending Physician: Ivette Espinoza MD    Discharge Diagnosis:   Syncope - possibly related to orthostatic hypotension vs hypovolemia   Right basilar opacity on chest x-ray  Abnormal CTA chest with unchanged bilateral lower lobe pulmonary embolism and persistent small bilateral pleural effusions and atelectasis  Hypokalemia  Hypomagnesemia  Hypophosphatemia   Hx of PAfib  Recent history of pulmonary embolism    Consultations:  IP CONSULT TO PALLIATIVE CARE  IP CONSULT TO CARDIOLOGY      Brief Admission History/Reason for Admission Per Marcy Ng MD:   Karson Rodriguez is a 83 y.o.  male with PMHx significant as below was brought to the ED for evaluation after the past patient was found to have passed out in his wheelchair.  Per his son patient was apparently all right till yesterday.  This a.m. patient was on his wheelchair , when he was found to be passed out-9 11 was called after that.  During the evaluation by EMS his blood pressure was found to be very low around 80 /60.  He needed IV fluid bolus.  On evaluation with the patient patient does not remember why he is brought to the hospital at this moment.  He however does not complain of pain at  any site.  He does not complain of difficulty breathing.  No complaints of burning with urination.     Review of systems-difficult to obtain-patient has baseline dementia.     In the ED, patient received ceftriaxone and azithromycin.  He also received normal saline bolus in the ED.  His blood pressure improved a lot.  Patient appears to be mentally alert and awake at times going back to sleep during conversation.     We were asked to admit for work up and evaluation of the above problems.        Brief Hospital Course by Main Problems:   Syncope - possibly related to orthostatic  hypotension vs hypovolemia   Repeat CT chest shows persistent pulmonary embolism from last week with no worsening.  2D echo shows normal ejection fraction with the sclerosis of the aortic valve cusp with and mild stenosis of aortic valve.  Pt received IVF resuscitation.  Evaluated by cardiology and recommended to continue anticoagulation.  Consider midodrine if he is orthostatic.  Per cardiology no need for pacemaker. He was recently taken off of ACE inhibitor and beta-blocker due to orthostasis.  Pt is not orthostatic today. Today patient is seen and examined, his vital signs are stable, his lab work is stable and he was cleared by all consultant parties including cardiology to be discharged for outpatient follow-up.   patient's son was updated via phone.      Right basilar opacity on chest x-ray  Abnormal CTA chest with unchanged bilateral lower lobe pulmonary embolism and persistent small bilateral pleural effusions and atelectasis  No leukocytosis or fever so less likely pneumonia and may be URTI.  Small effusions could be related to pulmonary embolism. Blood cultures negative.  S/p ceftriaxone and Zithromax.  He is currently on room air and saturating 98%     Hypokalemia  Hypomagnesemia, replaced.  Wnl las checked.  Hypophosphatemia  K and phos replaced again today. Will need labs outpt in 3 days.     Recent history of pulmonary embolism  Hx of PAfib  Repeat CT does not reveal increased clot burden.  Continue PTA apixaban  Needs anticoagulation for two reasons, so a Watchman alone wouldn't suffice.  If needed to lower bleeding risk, can stop aspirin.    Discharge Exam:  Patient seen and examined by me on discharge day.  Pertinent Findings:  Patient Vitals for the past 24 hrs:   BP Temp Temp src Pulse Resp SpO2   03/01/24 0830 (!) 153/70 -- -- (!) 102 18 96 %   02/29/24 2038 (!) 147/96 97.9 °F (36.6 °C) Oral 98 18 97 %   02/29/24 1935 -- -- -- (!) 106 -- --       Gen:    Not in distress  Chest: Clear lungs  [facility]:

## 2024-03-01 NOTE — PROGRESS NOTES
Palliative Medicine SW Note    [facility] who was resting, but awakened easily and was receptive to encounter. I introduced myself and asked him about his sons. 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 When I told him I would be calling Ronald and asked if there was anything he wanted me to tell him. Patient said, \"I haven't seen him in 2 days\" and would like to see him.  Patient stated he would like to return to sleep. This writer left vm for son Ronald requesting return call. Intend to ask son for secondary mPOA contact # and relationship to patient.     Thank you for including Palliative team in the care of Mr. Karson Rodriguez.    Adriana Jacobs, [facility]  Palliative Medicine [phone] ([phone])

## 2024-03-02 LAB
BACTERIA SPEC CULT: NORMAL
BACTERIA SPEC CULT: NORMAL
SERVICE CMNT-IMP: NORMAL
SERVICE CMNT-IMP: NORMAL

## 2024-03-04 ENCOUNTER — OFFICE VISIT (OUTPATIENT)
Facility: CLINIC | Age: 84
End: 2024-03-04
Payer: MEDICARE

## 2024-03-04 DIAGNOSIS — F03.B0 MODERATE DEMENTIA WITHOUT BEHAVIORAL DISTURBANCE, PSYCHOTIC DISTURBANCE, MOOD DISTURBANCE, OR ANXIETY, UNSPECIFIED DEMENTIA TYPE (HCC): ICD-10-CM

## 2024-03-04 DIAGNOSIS — I35.0 NONRHEUMATIC AORTIC VALVE STENOSIS: ICD-10-CM

## 2024-03-04 DIAGNOSIS — I26.99 PULMONARY EMBOLISM WITHOUT ACUTE COR PULMONALE, UNSPECIFIED CHRONICITY, UNSPECIFIED PULMONARY EMBOLISM TYPE (HCC): ICD-10-CM

## 2024-03-04 DIAGNOSIS — I48.91 ATRIAL FIBRILLATION, UNSPECIFIED TYPE (HCC): ICD-10-CM

## 2024-03-04 DIAGNOSIS — E87.6 HYPOKALEMIA: ICD-10-CM

## 2024-03-04 DIAGNOSIS — E11.9 TYPE 2 DIABETES MELLITUS WITHOUT COMPLICATION, WITHOUT LONG-TERM CURRENT USE OF INSULIN (HCC): ICD-10-CM

## 2024-03-04 DIAGNOSIS — R55 SYNCOPE AND COLLAPSE: Primary | ICD-10-CM

## 2024-03-04 PROCEDURE — 1123F ACP DISCUSS/DSCN MKR DOCD: CPT | Performed by: INTERNAL MEDICINE

## 2024-03-04 PROCEDURE — G8484 FLU IMMUNIZE NO ADMIN: HCPCS | Performed by: INTERNAL MEDICINE

## 2024-03-04 PROCEDURE — 99306 1ST NF CARE HIGH MDM 50: CPT | Performed by: INTERNAL MEDICINE

## 2024-03-05 ENCOUNTER — OFFICE VISIT (OUTPATIENT)
Facility: CLINIC | Age: 84
End: 2024-03-05
Payer: MEDICARE

## 2024-03-05 VITALS
BODY MASS INDEX: 26.47 KG/M2 | DIASTOLIC BLOOD PRESSURE: 78 MMHG | OXYGEN SATURATION: 97 % | HEART RATE: 80 BPM | RESPIRATION RATE: 18 BRPM | WEIGHT: 184.5 LBS | TEMPERATURE: 97.8 F | SYSTOLIC BLOOD PRESSURE: 120 MMHG

## 2024-03-05 DIAGNOSIS — F03.B0 MODERATE DEMENTIA WITHOUT BEHAVIORAL DISTURBANCE, PSYCHOTIC DISTURBANCE, MOOD DISTURBANCE, OR ANXIETY, UNSPECIFIED DEMENTIA TYPE (HCC): ICD-10-CM

## 2024-03-05 DIAGNOSIS — E83.39 HYPOPHOSPHATEMIA: ICD-10-CM

## 2024-03-05 DIAGNOSIS — E87.6 HYPOKALEMIA: ICD-10-CM

## 2024-03-05 DIAGNOSIS — E11.9 TYPE 2 DIABETES MELLITUS WITHOUT COMPLICATION, WITHOUT LONG-TERM CURRENT USE OF INSULIN (HCC): ICD-10-CM

## 2024-03-05 DIAGNOSIS — R55 SYNCOPE AND COLLAPSE: Primary | ICD-10-CM

## 2024-03-05 DIAGNOSIS — M62.81 MUSCLE WEAKNESS: ICD-10-CM

## 2024-03-05 DIAGNOSIS — E83.42 HYPOMAGNESEMIA: ICD-10-CM

## 2024-03-05 DIAGNOSIS — I26.99 PULMONARY EMBOLISM WITHOUT ACUTE COR PULMONALE, UNSPECIFIED CHRONICITY, UNSPECIFIED PULMONARY EMBOLISM TYPE (HCC): ICD-10-CM

## 2024-03-05 DIAGNOSIS — I48.91 ATRIAL FIBRILLATION, UNSPECIFIED TYPE (HCC): ICD-10-CM

## 2024-03-05 DIAGNOSIS — E78.2 MIXED HYPERLIPIDEMIA: ICD-10-CM

## 2024-03-05 PROCEDURE — 1123F ACP DISCUSS/DSCN MKR DOCD: CPT | Performed by: NURSE PRACTITIONER

## 2024-03-05 PROCEDURE — G8484 FLU IMMUNIZE NO ADMIN: HCPCS | Performed by: NURSE PRACTITIONER

## 2024-03-05 PROCEDURE — 99309 SBSQ NF CARE MODERATE MDM 30: CPT | Performed by: NURSE PRACTITIONER

## 2024-03-05 NOTE — PROGRESS NOTES
agitated.  Appropriate affect, mood, poor judgment and insight.  Genitourinary: No suprapubic tenderness or flank tenderness  Heme, lymph, immuno: No pallor;       Labs: Last labs reviewed from hospital discharge white blood cell count 3.9 hemoglobin 11.2 hematocrit 33.3 platelet 288 sodium 132 potassium 3.2 chloride 103 CO2 23 phosphorus 2.5    Current Outpatient Medications   Medication Sig Dispense Refill    apixaban (ELIQUIS) 5 MG TABS tablet Take 1 tablet by mouth 2 times daily 60 tablet 0    aspirin 81 MG chewable tablet Take 1 tablet by mouth daily 30 tablet 1    pioglitazone (ACTOS) 15 MG tablet Take 2 tablets by mouth daily 60 tablet 1    simvastatin (ZOCOR) 40 MG tablet Take 1 tablet by mouth nightly 30 tablet 1    acetaminophen (TYLENOL) 500 MG tablet Take 2 tablets by mouth in the morning and at bedtime 120 tablet 1    omeprazole (PRILOSEC) 20 MG delayed release capsule Take 1 capsule by mouth daily 30 capsule 1     No current facility-administered medications for this visit.        Assessment/Plans:    Diagnosis Orders   1. Syncope and collapse     Thought to be due to low blood pressure he received IV fluid bolus  Blood pressure medications were discontinued  At present blood pressures are stable at facility  Monitor for syncopal episode   2. Pulmonary embolism without acute cor pulmonale, unspecified chronicity, unspecified pulmonary embolism type (HCC)     Continues on Eliquis twice daily   3. Hypokalemia     Last potassium upon discharge from hospital was 3.2  Pending repeat   4. Atrial fibrillation, unspecified type (HCC)     Continue on Eliquis p.o. twice daily   5. Moderate dementia without behavioral disturbance, psychotic disturbance, mood disturbance, or anxiety, unspecified dementia type (HCC)     Monitor mentation not currently on any medications   6. Type 2 diabetes mellitus without complication, without long-term current use of insulin (HCC)     Continues on Actos  Monitor blood glucose

## 2024-03-07 ENCOUNTER — OFFICE VISIT (OUTPATIENT)
Facility: CLINIC | Age: 84
End: 2024-03-07

## 2024-03-07 VITALS
SYSTOLIC BLOOD PRESSURE: 145 MMHG | TEMPERATURE: 97.6 F | HEART RATE: 80 BPM | RESPIRATION RATE: 18 BRPM | OXYGEN SATURATION: 97 % | WEIGHT: 182 LBS | BODY MASS INDEX: 26.11 KG/M2 | DIASTOLIC BLOOD PRESSURE: 75 MMHG

## 2024-03-07 DIAGNOSIS — E78.2 MIXED HYPERLIPIDEMIA: ICD-10-CM

## 2024-03-07 DIAGNOSIS — R55 SYNCOPE AND COLLAPSE: Primary | ICD-10-CM

## 2024-03-07 DIAGNOSIS — I48.91 ATRIAL FIBRILLATION, UNSPECIFIED TYPE (HCC): ICD-10-CM

## 2024-03-07 DIAGNOSIS — E83.42 HYPOMAGNESEMIA: ICD-10-CM

## 2024-03-07 DIAGNOSIS — Z76.89 ENCOUNTER FOR SOCIAL WORK INTERVENTION: Primary | ICD-10-CM

## 2024-03-07 DIAGNOSIS — E83.39 HYPOPHOSPHATEMIA: ICD-10-CM

## 2024-03-07 DIAGNOSIS — F03.B0 MODERATE DEMENTIA WITHOUT BEHAVIORAL DISTURBANCE, PSYCHOTIC DISTURBANCE, MOOD DISTURBANCE, OR ANXIETY, UNSPECIFIED DEMENTIA TYPE (HCC): ICD-10-CM

## 2024-03-07 DIAGNOSIS — I26.99 PULMONARY EMBOLISM WITHOUT ACUTE COR PULMONALE, UNSPECIFIED CHRONICITY, UNSPECIFIED PULMONARY EMBOLISM TYPE (HCC): ICD-10-CM

## 2024-03-07 DIAGNOSIS — M62.81 MUSCLE WEAKNESS: ICD-10-CM

## 2024-03-07 DIAGNOSIS — E11.9 TYPE 2 DIABETES MELLITUS WITHOUT COMPLICATION, WITHOUT LONG-TERM CURRENT USE OF INSULIN (HCC): ICD-10-CM

## 2024-03-07 DIAGNOSIS — E87.6 HYPOKALEMIA: ICD-10-CM

## 2024-03-07 NOTE — PROGRESS NOTES
unspecified type (HCC)     Continue on Eliquis p.o. twice daily   5. Moderate dementia without behavioral disturbance, psychotic disturbance, mood disturbance, or anxiety, unspecified dementia type (HCC)     Monitor mentation not currently on any medications   6. Type 2 diabetes mellitus without complication, without long-term current use of insulin (HCC)     Continues on Actos  Monitor blood glucose levels  Currently controlled   7. Hypophosphatemia     Last phosphorus 2.5 at hospital and treated  Phosphorus level on 3/12/2024  Will need to periodically monitor   8. Hypomagnesemia     Magnesium repleted during hospitalization  Magnesium level on 3/12/2024  Periodically monitor   9. Mixed hyperlipidemia     Continues on statin   10. Muscle weakness   Needs significant condition  PT notes reviewed  Work with PT/OT for LANCE Wallace NP

## 2024-03-07 NOTE — PROGRESS NOTES
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  [x] Yes   [] No  Additional notes:    Spirituality/Christianity affiliation:   Additional notes:    Is patient a :          [] Yes    []  No  Additional notes:    Advance Care Plan:          [x] Yes   [] No  Additional notes:  ACP documents on file    Housing:   Are you concerned about housing?                                  [] Yes   [x] No  Additional notes:    Food:  Can you afford nutritious meals?                                     [] Yes   [] No  Are you able to prepare your own meals?                       [] Yes   [] No  Are you able to access food?                                           [] Yes   [] No  Additional notes:  Meals were provided at [facility]. living    Financial:   Do you manage your own finances?                               [] Yes   []  No  Can you afford your household expenses?                    [] Yes   [] No  Can you afford your medical copays?                             [] Yes   [] No  Additional notes:    Transportation:  Do you have reliable transportation to medical appointments?   [] Yes   [] No  Additional notes:    Legal:  Are there any existing legal issues?                                                      [] Yes   [] No       Additional notes:    Medications:  Can you obtain your medications from the pharmacy?                          [] Yes   [] No  Is your medication schedule confusing?

## 2024-03-11 ENCOUNTER — OFFICE VISIT (OUTPATIENT)
Facility: CLINIC | Age: 84
End: 2024-03-11
Payer: MEDICARE

## 2024-03-11 VITALS
SYSTOLIC BLOOD PRESSURE: 169 MMHG | DIASTOLIC BLOOD PRESSURE: 78 MMHG | RESPIRATION RATE: 18 BRPM | TEMPERATURE: 98 F | OXYGEN SATURATION: 99 % | HEART RATE: 99 BPM | BODY MASS INDEX: 26.11 KG/M2 | WEIGHT: 182 LBS

## 2024-03-11 DIAGNOSIS — I26.99 PULMONARY EMBOLISM WITHOUT ACUTE COR PULMONALE, UNSPECIFIED CHRONICITY, UNSPECIFIED PULMONARY EMBOLISM TYPE (HCC): ICD-10-CM

## 2024-03-11 DIAGNOSIS — E11.9 TYPE 2 DIABETES MELLITUS WITHOUT COMPLICATION, WITHOUT LONG-TERM CURRENT USE OF INSULIN (HCC): ICD-10-CM

## 2024-03-11 DIAGNOSIS — E83.39 HYPOPHOSPHATEMIA: ICD-10-CM

## 2024-03-11 DIAGNOSIS — F03.B0 MODERATE DEMENTIA WITHOUT BEHAVIORAL DISTURBANCE, PSYCHOTIC DISTURBANCE, MOOD DISTURBANCE, OR ANXIETY, UNSPECIFIED DEMENTIA TYPE (HCC): ICD-10-CM

## 2024-03-11 DIAGNOSIS — R55 SYNCOPE AND COLLAPSE: Primary | ICD-10-CM

## 2024-03-11 DIAGNOSIS — E83.42 HYPOMAGNESEMIA: ICD-10-CM

## 2024-03-11 DIAGNOSIS — E78.2 MIXED HYPERLIPIDEMIA: ICD-10-CM

## 2024-03-11 DIAGNOSIS — M62.81 MUSCLE WEAKNESS: ICD-10-CM

## 2024-03-11 DIAGNOSIS — I48.91 ATRIAL FIBRILLATION, UNSPECIFIED TYPE (HCC): ICD-10-CM

## 2024-03-11 DIAGNOSIS — E87.6 HYPOKALEMIA: ICD-10-CM

## 2024-03-11 PROCEDURE — 1123F ACP DISCUSS/DSCN MKR DOCD: CPT | Performed by: NURSE PRACTITIONER

## 2024-03-11 PROCEDURE — G8484 FLU IMMUNIZE NO ADMIN: HCPCS | Performed by: NURSE PRACTITIONER

## 2024-03-11 PROCEDURE — 99309 SBSQ NF CARE MODERATE MDM 30: CPT | Performed by: NURSE PRACTITIONER

## 2024-03-11 NOTE — PROGRESS NOTES
[facility]  [address] [place] [place] [address]  Phone: [phone]  Fax: [phone]  Also available via [employer]     PLACE OF SERVICE:  [facility] [address] [place] [address]    SKILLED VISIT    Chief Complaint:   Chief Complaint   Patient presents with    Follow-up         HPI : Karson Rodriguez is a 83 y.o. male here for follow up.    Previous history prior to admission:  Karson Rodriguez is a 83 y.o. male with history of dementia, atrial fibrillation, recent pulmonary embolism on apixaban, hypertension was brought to ED for evaluation after patient was found to have passed out in his wheelchair.  911 was called.  During evaluation his blood pressure was found to be very low around 80/60.  He was given IV fluid bolus. In the ED patient received ceftriaxone and Flomax he also received normal saline bolus and his condition improved.  He was admitted to hospital.  Repeat CT of the chest showed persistent pulmonary embolism from last week with no worsening.  2D echo showed normal ejection fraction with aortic sclerosis and mild stenosis of aortic valve.  Patient received IV resuscitation.  Evaluated by cardiology and recommended to continue anticoagulation.  Also consider midodrine patient is orthostatic.  Per cardiology no need for pacemaker.  He was recently taken off ACE inhibitor's and beta-blockers due to orthostasis.  Before discharge patient was checked and not orthostatic.  Blood pressure was pretty running on the high side.  He was seen by PT OT who recommended skilled rehab or SNF.  He is admitted to [facility] for skilled rehab    Current visit:  Patient is sitting up in the wheelchair today he is awake alert oriented x 2.  He is interactive and engaged with provider.  Reviewed vital signs from facility blood pressure has been ranging 120-150 systolic with a periodic outlier. He previously had had syncopal episodes and blood

## 2024-03-13 ENCOUNTER — OFFICE VISIT (OUTPATIENT)
Facility: CLINIC | Age: 84
End: 2024-03-13
Payer: MEDICARE

## 2024-03-13 VITALS
OXYGEN SATURATION: 98 % | BODY MASS INDEX: 25.25 KG/M2 | HEART RATE: 70 BPM | TEMPERATURE: 97.7 F | DIASTOLIC BLOOD PRESSURE: 80 MMHG | SYSTOLIC BLOOD PRESSURE: 130 MMHG | WEIGHT: 176 LBS | RESPIRATION RATE: 18 BRPM

## 2024-03-13 DIAGNOSIS — E87.6 HYPOKALEMIA: ICD-10-CM

## 2024-03-13 DIAGNOSIS — I48.91 ATRIAL FIBRILLATION, UNSPECIFIED TYPE (HCC): ICD-10-CM

## 2024-03-13 DIAGNOSIS — M62.81 MUSCLE WEAKNESS: ICD-10-CM

## 2024-03-13 DIAGNOSIS — I26.99 PULMONARY EMBOLISM WITHOUT ACUTE COR PULMONALE, UNSPECIFIED CHRONICITY, UNSPECIFIED PULMONARY EMBOLISM TYPE (HCC): ICD-10-CM

## 2024-03-13 DIAGNOSIS — R55 SYNCOPE AND COLLAPSE: Primary | ICD-10-CM

## 2024-03-13 DIAGNOSIS — E83.39 HYPOPHOSPHATEMIA: ICD-10-CM

## 2024-03-13 DIAGNOSIS — F03.B0 MODERATE DEMENTIA WITHOUT BEHAVIORAL DISTURBANCE, PSYCHOTIC DISTURBANCE, MOOD DISTURBANCE, OR ANXIETY, UNSPECIFIED DEMENTIA TYPE (HCC): ICD-10-CM

## 2024-03-13 DIAGNOSIS — E78.2 MIXED HYPERLIPIDEMIA: ICD-10-CM

## 2024-03-13 DIAGNOSIS — E83.42 HYPOMAGNESEMIA: ICD-10-CM

## 2024-03-13 DIAGNOSIS — E11.9 TYPE 2 DIABETES MELLITUS WITHOUT COMPLICATION, WITHOUT LONG-TERM CURRENT USE OF INSULIN (HCC): ICD-10-CM

## 2024-03-13 PROCEDURE — G8484 FLU IMMUNIZE NO ADMIN: HCPCS | Performed by: NURSE PRACTITIONER

## 2024-03-13 PROCEDURE — 1123F ACP DISCUSS/DSCN MKR DOCD: CPT | Performed by: NURSE PRACTITIONER

## 2024-03-13 PROCEDURE — 99309 SBSQ NF CARE MODERATE MDM 30: CPT | Performed by: NURSE PRACTITIONER

## 2024-03-13 NOTE — PROGRESS NOTES
[facility]  [address] [place] [address]  Phone: [phone]  Fax: [phone]  Also available via Perfect Serve     Place of Service:  {Required Place of Service:48480}                                                                     Discharge Summary       Admit Dx:  ***    Disposition: {[facility] Dispo:95919}    Presentation:  []    Discharge time : {[facility] time:67913}    Brief SNF Course:  This is an 83 y.o. male []      Code Status:  []    Exam:  Constitutional: No acute distress;   Eyes: Sclera clear, PERRLA;   Ears/nose/mouth/throat:mmm, OP clear, trachea midline;  Cardiovascular: RRR,nml S1 and S2, no rubs murmurs or gallops, no edema, no cyanosis;   Respiratory: Clear to auscultation, symmetric, no respiratory distress;  Gastrointestinal: Abdomen soft, NT, ND, no masses, normal bowel sounds;  Neurologic: Cranial nerves II through VII grossly intact, no speech or motor deficits A&O in time place and person  Skin: No rash, warm and dry;  Musculoskeletal: No erythema swelling or joint tenderness, extremities without cyanosis, neck supple, ROM intact spine and extremities;  Psychiatric: Not agitated.  Appropriate affect, mood, judgment and insight.  Genitourinary: No suprapubic tenderness or flank tenderness  Heme, lymph, immuno: No pallor;      Current Outpatient Medications   Medication Sig Dispense Refill    apixaban (ELIQUIS) 5 MG TABS tablet Take 1 tablet by mouth 2 times daily 60 tablet 0    aspirin 81 MG chewable tablet Take 1 tablet by mouth daily 30 tablet 1    pioglitazone (ACTOS) 15 MG tablet Take 2 tablets by mouth daily 60 tablet 1    simvastatin (ZOCOR) 40 MG tablet Take 1 tablet by mouth nightly 30 tablet 1    acetaminophen (TYLENOL) 500 MG tablet Take 2 tablets by mouth in the morning and at bedtime 120 tablet 1    omeprazole (PRILOSEC) 20 MG delayed release capsule Take 1 capsule by mouth daily 30 capsule 1     No current facility-administered medications for this 
[facility]  [address] [place] [address]  Phone: [phone]  Fax: [phone]  Also available via Perfect Serve     PLACE OF SERVICE:  [facility] [address] [place] [address]    SKILLED VISIT    Chief Complaint:   Chief Complaint   Patient presents with    Follow-up         HPI : Karson Rodriguez is a 83 y.o. male here for follow up.    Previous history prior to admission:  Karson Rodriguez is a 83 y.o. male with history of dementia, atrial fibrillation, recent pulmonary embolism on apixaban, hypertension was brought to ED for evaluation after patient was found to have passed out in his wheelchair.  911 was called.  During evaluation his blood pressure was found to be very low around 80/60.  He was given IV fluid bolus. In the ED patient received ceftriaxone and Flomax he also received normal saline bolus and his condition improved.  He was admitted to hospital.  Repeat CT of the chest showed persistent pulmonary embolism from last week with no worsening.  2D echo showed normal ejection fraction with aortic sclerosis and mild stenosis of aortic valve.  Patient received IV resuscitation.  Evaluated by cardiology and recommended to continue anticoagulation.  Also consider midodrine patient is orthostatic.  Per cardiology no need for pacemaker.  He was recently taken off ACE inhibitor's and beta-blockers due to orthostasis.  Before discharge patient was checked and not orthostatic.  Blood pressure was pretty running on the high side.  He was seen by PT OT who recommended skilled rehab or SNF.  He is admitted to [facility] for skilled rehab    Current visit:  Patient is in the bed today he is awake alert oriented x 2.  He is interactive and engaged with provider.  Reviewed vital signs from facility blood pressure has been ranging 120-150 systolic with a periodic outlier. He previously had had syncopal episodes and blood pressure 
pulmonary embolism type (HCC)     Continues on Eliquis twice daily   3. Hypokalemia     Last potassium upon discharge from hospital was 3.2  Labs reviewed from 3/5/2024 potassium 3.8  Labs scheduled again for 3/12/2024 BMP magnesium  Periodically monitor   4. Atrial fibrillation, unspecified type (HCC)     Continue on Eliquis p.o. twice daily   5. Moderate dementia without behavioral disturbance, psychotic disturbance, mood disturbance, or anxiety, unspecified dementia type (HCC)     Monitor mentation not currently on any medications   6. Type 2 diabetes mellitus without complication, without long-term current use of insulin (HCC)     Continues on Actos  Monitor blood glucose levels  Currently controlled   7. Hypophosphatemia     Last phosphorus 2.5 at hospital and treated  Phosphorus level on 3/12/2024  Will need to periodically monitor   8. Hypomagnesemia     Magnesium repleted during hospitalization  Magnesium level on 3/12/2024  Periodically monitor   9. Mixed hyperlipidemia     Continues on statin   10. Muscle weakness   Needs significant condition  PT notes reviewed  Work with PT/OT for L[facility] - [facility]

## 2024-03-15 ENCOUNTER — OFFICE VISIT (OUTPATIENT)
Facility: CLINIC | Age: 84
End: 2024-03-15

## 2024-03-15 VITALS
TEMPERATURE: 97.6 F | WEIGHT: 176 LBS | DIASTOLIC BLOOD PRESSURE: 78 MMHG | BODY MASS INDEX: 25.25 KG/M2 | RESPIRATION RATE: 18 BRPM | HEART RATE: 97 BPM | OXYGEN SATURATION: 97 % | SYSTOLIC BLOOD PRESSURE: 134 MMHG

## 2024-03-15 DIAGNOSIS — I48.91 ATRIAL FIBRILLATION, UNSPECIFIED TYPE (HCC): ICD-10-CM

## 2024-03-15 DIAGNOSIS — F03.B0 MODERATE DEMENTIA WITHOUT BEHAVIORAL DISTURBANCE, PSYCHOTIC DISTURBANCE, MOOD DISTURBANCE, OR ANXIETY, UNSPECIFIED DEMENTIA TYPE (HCC): ICD-10-CM

## 2024-03-15 DIAGNOSIS — E78.2 MIXED HYPERLIPIDEMIA: ICD-10-CM

## 2024-03-15 DIAGNOSIS — I26.99 PULMONARY EMBOLISM WITHOUT ACUTE COR PULMONALE, UNSPECIFIED CHRONICITY, UNSPECIFIED PULMONARY EMBOLISM TYPE (HCC): ICD-10-CM

## 2024-03-15 DIAGNOSIS — M62.81 MUSCLE WEAKNESS: ICD-10-CM

## 2024-03-15 DIAGNOSIS — E11.9 TYPE 2 DIABETES MELLITUS WITHOUT COMPLICATION, WITHOUT LONG-TERM CURRENT USE OF INSULIN (HCC): ICD-10-CM

## 2024-03-15 DIAGNOSIS — E87.6 HYPOKALEMIA: ICD-10-CM

## 2024-03-15 DIAGNOSIS — E83.42 HYPOMAGNESEMIA: ICD-10-CM

## 2024-03-15 DIAGNOSIS — E83.39 HYPOPHOSPHATEMIA: ICD-10-CM

## 2024-03-15 DIAGNOSIS — R55 SYNCOPE AND COLLAPSE: Primary | ICD-10-CM

## 2024-03-19 ENCOUNTER — OFFICE VISIT (OUTPATIENT)
Facility: CLINIC | Age: 84
End: 2024-03-19
Payer: MEDICARE

## 2024-03-19 VITALS
OXYGEN SATURATION: 98 % | RESPIRATION RATE: 18 BRPM | TEMPERATURE: 98.2 F | WEIGHT: 181 LBS | HEART RATE: 61 BPM | DIASTOLIC BLOOD PRESSURE: 67 MMHG | BODY MASS INDEX: 25.97 KG/M2 | SYSTOLIC BLOOD PRESSURE: 132 MMHG

## 2024-03-19 DIAGNOSIS — E87.6 HYPOKALEMIA: ICD-10-CM

## 2024-03-19 DIAGNOSIS — F03.B0 MODERATE DEMENTIA WITHOUT BEHAVIORAL DISTURBANCE, PSYCHOTIC DISTURBANCE, MOOD DISTURBANCE, OR ANXIETY, UNSPECIFIED DEMENTIA TYPE (HCC): ICD-10-CM

## 2024-03-19 DIAGNOSIS — R55 SYNCOPE AND COLLAPSE: Primary | ICD-10-CM

## 2024-03-19 DIAGNOSIS — E11.9 TYPE 2 DIABETES MELLITUS WITHOUT COMPLICATION, WITHOUT LONG-TERM CURRENT USE OF INSULIN (HCC): ICD-10-CM

## 2024-03-19 DIAGNOSIS — I26.99 PULMONARY EMBOLISM WITHOUT ACUTE COR PULMONALE, UNSPECIFIED CHRONICITY, UNSPECIFIED PULMONARY EMBOLISM TYPE (HCC): ICD-10-CM

## 2024-03-19 DIAGNOSIS — E83.42 HYPOMAGNESEMIA: ICD-10-CM

## 2024-03-19 DIAGNOSIS — E83.39 HYPOPHOSPHATEMIA: ICD-10-CM

## 2024-03-19 DIAGNOSIS — M62.81 MUSCLE WEAKNESS: ICD-10-CM

## 2024-03-19 DIAGNOSIS — I48.91 ATRIAL FIBRILLATION, UNSPECIFIED TYPE (HCC): ICD-10-CM

## 2024-03-19 DIAGNOSIS — E78.2 MIXED HYPERLIPIDEMIA: ICD-10-CM

## 2024-03-19 PROCEDURE — 99309 SBSQ NF CARE MODERATE MDM 30: CPT | Performed by: NURSE PRACTITIONER

## 2024-03-19 PROCEDURE — 1123F ACP DISCUSS/DSCN MKR DOCD: CPT | Performed by: NURSE PRACTITIONER

## 2024-03-19 PROCEDURE — G8484 FLU IMMUNIZE NO ADMIN: HCPCS | Performed by: NURSE PRACTITIONER

## 2024-03-19 NOTE — PROGRESS NOTES
mouth daily 30 capsule 1     No current facility-administered medications for this visit.        Assessment/Plans:    Diagnosis Orders   1. Syncope and collapse     Thought to be due to low blood pressure he received IV fluid bolus  Blood pressure medications were discontinued  At present blood pressures are stable at facility having some elevated Bps, if runs over 150 consistently may need to consider introducing meds back  Monitor for syncopal episode   2. Pulmonary embolism without acute cor pulmonale, unspecified chronicity, unspecified pulmonary embolism type (HCC)     Continues on Eliquis twice daily   3. Hypokalemia     Last potassium upon discharge from hospital was 3.2  Labs reviewed from 3/5/2024 potassium 3.8  Labs scheduled again for 3/12/2024 BMP magnesium-potassium stable 3.6  BMP scheduled on 3/27/2024  Periodically monitor   4. Atrial fibrillation, unspecified type (HCC)     Continue on Eliquis p.o. twice daily   5. Moderate dementia without behavioral disturbance, psychotic disturbance, mood disturbance, or anxiety, unspecified dementia type ([facility])     Monitor mentation not currently on any medications   6. Type 2 diabetes mellitus without complication, without long-term current use of insulin ([facility])     Continues on Actos  Monitor blood glucose levels  Currently controlled   7. Hypophosphatemia     Last phosphorus 2.5 at hospital and treated  Phosphorus level on 3/12/2024 -phosphorus 2.4-started on K-Phos neutral tablet 2 tablets p.o. 4 times daily  Repeat phosphorus on 3/18/2024 -phosphorus 2.6  Repeat K-Phos neutral, to tolerate p.o. 4 times daily x 1 more day  Repeat phosphorus 3/27/2024  Will need to periodically monitor   8. Hypomagnesemia     Magnesium repleted during hospitalization  Magnesium level on 3/12/2024 -1.4  Start magnesium oxide 400 mg p.o. daily x 14 days repeat magnesium level on 3/27/2024  Periodically monitor   9. Mixed hyperlipidemia     Continues on statin   10. Muscle

## 2024-03-21 ENCOUNTER — OFFICE VISIT (OUTPATIENT)
Facility: CLINIC | Age: 84
End: 2024-03-21
Payer: MEDICARE

## 2024-03-21 VITALS
WEIGHT: 181 LBS | DIASTOLIC BLOOD PRESSURE: 60 MMHG | RESPIRATION RATE: 18 BRPM | TEMPERATURE: 98 F | SYSTOLIC BLOOD PRESSURE: 138 MMHG | OXYGEN SATURATION: 96 % | HEART RATE: 82 BPM | BODY MASS INDEX: 25.97 KG/M2

## 2024-03-21 DIAGNOSIS — I26.99 PULMONARY EMBOLISM WITHOUT ACUTE COR PULMONALE, UNSPECIFIED CHRONICITY, UNSPECIFIED PULMONARY EMBOLISM TYPE (HCC): ICD-10-CM

## 2024-03-21 DIAGNOSIS — E87.6 HYPOKALEMIA: ICD-10-CM

## 2024-03-21 DIAGNOSIS — R55 SYNCOPE AND COLLAPSE: Primary | ICD-10-CM

## 2024-03-21 DIAGNOSIS — E83.39 HYPOPHOSPHATEMIA: ICD-10-CM

## 2024-03-21 DIAGNOSIS — E78.2 MIXED HYPERLIPIDEMIA: ICD-10-CM

## 2024-03-21 DIAGNOSIS — E83.42 HYPOMAGNESEMIA: ICD-10-CM

## 2024-03-21 DIAGNOSIS — M62.81 MUSCLE WEAKNESS: ICD-10-CM

## 2024-03-21 DIAGNOSIS — E11.9 TYPE 2 DIABETES MELLITUS WITHOUT COMPLICATION, WITHOUT LONG-TERM CURRENT USE OF INSULIN (HCC): ICD-10-CM

## 2024-03-21 DIAGNOSIS — I48.91 ATRIAL FIBRILLATION, UNSPECIFIED TYPE (HCC): ICD-10-CM

## 2024-03-21 DIAGNOSIS — F03.B0 MODERATE DEMENTIA WITHOUT BEHAVIORAL DISTURBANCE, PSYCHOTIC DISTURBANCE, MOOD DISTURBANCE, OR ANXIETY, UNSPECIFIED DEMENTIA TYPE (HCC): ICD-10-CM

## 2024-03-21 PROCEDURE — G8484 FLU IMMUNIZE NO ADMIN: HCPCS | Performed by: NURSE PRACTITIONER

## 2024-03-21 PROCEDURE — 99309 SBSQ NF CARE MODERATE MDM 30: CPT | Performed by: NURSE PRACTITIONER

## 2024-03-21 PROCEDURE — 1123F ACP DISCUSS/DSCN MKR DOCD: CPT | Performed by: NURSE PRACTITIONER

## 2024-03-21 NOTE — PROGRESS NOTES
[facility]  [address] [place] [address]  Phone: [phone]  Fax: [phone]  Also available via Perfect Serve     PLACE OF SERVICE:  [facility] [address] [place] [address]    SKILLED VISIT    Chief Complaint:   Chief Complaint   Patient presents with    Follow-up         HPI : Karson Rodriguez is a 84 y.o. male here for follow up.    Previous history prior to admission:  Karson Rodriguez is a 83 y.o. male with history of dementia, atrial fibrillation, recent pulmonary embolism on apixaban, hypertension was brought to ED for evaluation after patient was found to have passed out in his wheelchair.  911 was called.  During evaluation his blood pressure was found to be very low around 80/60.  He was given IV fluid bolus. In the ED patient received ceftriaxone and Flomax he also received normal saline bolus and his condition improved.  He was admitted to hospital.  Repeat CT of the chest showed persistent pulmonary embolism from last week with no worsening.  2D echo showed normal ejection fraction with aortic sclerosis and mild stenosis of aortic valve.  Patient received IV resuscitation.  Evaluated by cardiology and recommended to continue anticoagulation.  Also consider midodrine patient is orthostatic.  Per cardiology no need for pacemaker.  He was recently taken off ACE inhibitor's and beta-blockers due to orthostasis.  Before discharge patient was checked and not orthostatic.  Blood pressure was pretty running on the high side.  He was seen by PT OT who recommended skilled rehab or SNF.  He is admitted to [facility] for skilled rehab    Current visit:  Patient is lying in bed today he is awake alert oriented x 2.  He is interactive and engaged with provider.   Provider reviewed vital signs from facility blood pressure has been ranging 120-140 systolic with a periodic outlier. He previously had had syncopal episodes and blood

## 2024-03-26 ENCOUNTER — OFFICE VISIT (OUTPATIENT)
Facility: CLINIC | Age: 84
End: 2024-03-26
Payer: MEDICARE

## 2024-03-26 VITALS
TEMPERATURE: 97.4 F | HEART RATE: 83 BPM | RESPIRATION RATE: 18 BRPM | WEIGHT: 181.2 LBS | SYSTOLIC BLOOD PRESSURE: 138 MMHG | OXYGEN SATURATION: 95 % | BODY MASS INDEX: 26 KG/M2 | DIASTOLIC BLOOD PRESSURE: 72 MMHG

## 2024-03-26 DIAGNOSIS — F03.B0 MODERATE DEMENTIA WITHOUT BEHAVIORAL DISTURBANCE, PSYCHOTIC DISTURBANCE, MOOD DISTURBANCE, OR ANXIETY, UNSPECIFIED DEMENTIA TYPE (HCC): ICD-10-CM

## 2024-03-26 DIAGNOSIS — I48.91 ATRIAL FIBRILLATION, UNSPECIFIED TYPE (HCC): ICD-10-CM

## 2024-03-26 DIAGNOSIS — M62.81 MUSCLE WEAKNESS: ICD-10-CM

## 2024-03-26 DIAGNOSIS — I26.99 PULMONARY EMBOLISM WITHOUT ACUTE COR PULMONALE, UNSPECIFIED CHRONICITY, UNSPECIFIED PULMONARY EMBOLISM TYPE (HCC): ICD-10-CM

## 2024-03-26 DIAGNOSIS — E87.6 HYPOKALEMIA: ICD-10-CM

## 2024-03-26 DIAGNOSIS — E78.2 MIXED HYPERLIPIDEMIA: ICD-10-CM

## 2024-03-26 DIAGNOSIS — R55 SYNCOPE AND COLLAPSE: Primary | ICD-10-CM

## 2024-03-26 DIAGNOSIS — E83.42 HYPOMAGNESEMIA: ICD-10-CM

## 2024-03-26 DIAGNOSIS — E83.39 HYPOPHOSPHATEMIA: ICD-10-CM

## 2024-03-26 DIAGNOSIS — E11.9 TYPE 2 DIABETES MELLITUS WITHOUT COMPLICATION, WITHOUT LONG-TERM CURRENT USE OF INSULIN (HCC): ICD-10-CM

## 2024-03-26 PROBLEM — E86.0 DEHYDRATION: Status: RESOLVED | Noted: 2024-02-25 | Resolved: 2024-03-26

## 2024-03-26 PROCEDURE — 1123F ACP DISCUSS/DSCN MKR DOCD: CPT | Performed by: NURSE PRACTITIONER

## 2024-03-26 PROCEDURE — 99309 SBSQ NF CARE MODERATE MDM 30: CPT | Performed by: NURSE PRACTITIONER

## 2024-03-26 PROCEDURE — G8484 FLU IMMUNIZE NO ADMIN: HCPCS | Performed by: NURSE PRACTITIONER

## 2024-03-26 NOTE — PROGRESS NOTES
[facility]  [address] [place] [address]  Phone: [phone]  Fax: [phone]  Also available via Perfect Serve     PLACE OF SERVICE:  [facility] [address] [place] [address]    SKILLED VISIT    Chief Complaint:   Chief Complaint   Patient presents with    Follow-up     30 DAY RECERTIFICATION         HPI : Karson Rodriguez is a 84 y.o. male here for follow up.    Previous history prior to admission:  Karson Rodriguez is a 83 y.o. male with history of dementia, atrial fibrillation, recent pulmonary embolism on apixaban, hypertension was brought to ED for evaluation after patient was found to have passed out in his wheelchair.  911 was called.  During evaluation his blood pressure was found to be very low around 80/60.  He was given IV fluid bolus. In the ED patient received ceftriaxone and Flomax he also received normal saline bolus and his condition improved.  He was admitted to hospital.  Repeat CT of the chest showed persistent pulmonary embolism from last week with no worsening.  2D echo showed normal ejection fraction with aortic sclerosis and mild stenosis of aortic valve.  Patient received IV resuscitation.  Evaluated by cardiology and recommended to continue anticoagulation.  Also consider midodrine patient is orthostatic.  Per cardiology no need for pacemaker.  He was recently taken off ACE inhibitor's and beta-blockers due to orthostasis.  Before discharge patient was checked and not orthostatic.  Blood pressure was pretty running on the high side.  He was seen by PT OT who recommended skilled rehab or SNF.  He is admitted to [facility] for skilled rehab    Current visit:  Patient is lying in bed today he is awake alert oriented x 2.  He is interactive and engaged with provider.   Provider reviewed vital signs from facility blood pressure has been ranging 120-140 systolic with a periodic outlier. He previously had had

## 2024-03-28 ENCOUNTER — OFFICE VISIT (OUTPATIENT)
Facility: CLINIC | Age: 84
End: 2024-03-28
Payer: MEDICARE

## 2024-03-28 VITALS
BODY MASS INDEX: 26 KG/M2 | DIASTOLIC BLOOD PRESSURE: 75 MMHG | WEIGHT: 181.2 LBS | SYSTOLIC BLOOD PRESSURE: 141 MMHG | TEMPERATURE: 97.2 F | HEART RATE: 98 BPM | RESPIRATION RATE: 20 BRPM | OXYGEN SATURATION: 97 %

## 2024-03-28 DIAGNOSIS — F03.C11 SEVERE DEMENTIA WITH AGITATION, UNSPECIFIED DEMENTIA TYPE (HCC): ICD-10-CM

## 2024-03-28 DIAGNOSIS — E83.42 HYPOMAGNESEMIA: ICD-10-CM

## 2024-03-28 DIAGNOSIS — K21.9 GASTROESOPHAGEAL REFLUX DISEASE WITHOUT ESOPHAGITIS: ICD-10-CM

## 2024-03-28 DIAGNOSIS — M62.81 MUSCLE WEAKNESS: ICD-10-CM

## 2024-03-28 DIAGNOSIS — I48.91 ATRIAL FIBRILLATION, UNSPECIFIED TYPE (HCC): ICD-10-CM

## 2024-03-28 DIAGNOSIS — E83.39 HYPOPHOSPHATEMIA: ICD-10-CM

## 2024-03-28 DIAGNOSIS — M19.90 OSTEOARTHRITIS, UNSPECIFIED OSTEOARTHRITIS TYPE, UNSPECIFIED SITE: ICD-10-CM

## 2024-03-28 DIAGNOSIS — E87.6 HYPOKALEMIA: ICD-10-CM

## 2024-03-28 DIAGNOSIS — E78.2 MIXED HYPERLIPIDEMIA: ICD-10-CM

## 2024-03-28 DIAGNOSIS — R55 SYNCOPE AND COLLAPSE: Primary | ICD-10-CM

## 2024-03-28 DIAGNOSIS — I26.99 PULMONARY EMBOLISM WITHOUT ACUTE COR PULMONALE, UNSPECIFIED CHRONICITY, UNSPECIFIED PULMONARY EMBOLISM TYPE (HCC): ICD-10-CM

## 2024-03-28 DIAGNOSIS — E11.9 TYPE 2 DIABETES MELLITUS WITHOUT COMPLICATION, WITHOUT LONG-TERM CURRENT USE OF INSULIN (HCC): ICD-10-CM

## 2024-03-28 DIAGNOSIS — F03.B0 MODERATE DEMENTIA WITHOUT BEHAVIORAL DISTURBANCE, PSYCHOTIC DISTURBANCE, MOOD DISTURBANCE, OR ANXIETY, UNSPECIFIED DEMENTIA TYPE (HCC): ICD-10-CM

## 2024-03-28 PROCEDURE — 99316 NF DSCHRG MGMT 30 MIN+: CPT | Performed by: NURSE PRACTITIONER

## 2024-03-28 PROCEDURE — G8484 FLU IMMUNIZE NO ADMIN: HCPCS | Performed by: NURSE PRACTITIONER

## 2024-03-28 RX ORDER — OMEPRAZOLE 20 MG/1
20 CAPSULE, DELAYED RELEASE ORAL DAILY
Qty: 30 CAPSULE | Refills: 1 | Status: SHIPPED | OUTPATIENT
Start: 2024-03-28 | End: 2024-05-27

## 2024-03-28 RX ORDER — PIOGLITAZONEHYDROCHLORIDE 15 MG/1
30 TABLET ORAL DAILY
Qty: 60 TABLET | Refills: 1 | Status: SHIPPED | OUTPATIENT
Start: 2024-03-28

## 2024-03-28 RX ORDER — ACETAMINOPHEN 500 MG
1000 TABLET ORAL 2 TIMES DAILY
Qty: 120 TABLET | Refills: 1 | Status: SHIPPED | OUTPATIENT
Start: 2024-03-28 | End: 2024-05-27

## 2024-03-28 RX ORDER — SIMVASTATIN 40 MG
40 TABLET ORAL NIGHTLY
Qty: 30 TABLET | Refills: 1 | Status: SHIPPED | OUTPATIENT
Start: 2024-03-28

## 2024-03-28 RX ORDER — ASPIRIN 81 MG/1
81 TABLET, CHEWABLE ORAL DAILY
Qty: 30 TABLET | Refills: 1 | Status: SHIPPED | OUTPATIENT
Start: 2024-03-28 | End: 2024-05-27

## 2024-03-28 NOTE — PROGRESS NOTES
Thought to be due to low blood pressure as he received IV fluids during hospitalization.  Blood pressure medications were discontinued.  Blood pressures during stay at facility have run 120-150  No medications have been reintroduced and has not had any further syncopal episodes   2. Osteoarthritis, unspecified osteoarthritis type, unspecified site  acetaminophen (TYLENOL) 500 MG tablet   Continues on scheduled Tylenol for pain management twice daily   3. Atrial fibrillation, unspecified type ([facility])  aspirin 81 MG chewable tablet   Heart rate is rate controlled  He continues on apixaban for prophylaxis   4. Gastroesophageal reflux disease without esophagitis  omeprazole (PRILOSEC) 20 MG delayed release capsule   Continues on omeprazole daily  Asymptomatic   5. Type 2 diabetes mellitus without complication, without long-term current use of insulin ([facility])  pioglitazone (ACTOS) 15 MG tablet   Continue pioglitazone 2 tablets p.o. daily  Blood glucose levels were checked upon admission but discontinued as they were stable  Follow-up with primary care   6. Mixed hyperlipidemia  simvastatin (ZOCOR) 40 MG tablet   Continue simvastatin nightly   7. Pulmonary embolism without acute cor pulmonale, unspecified chronicity, unspecified pulmonary embolism type ([facility])     Continue apixaban twice daily  No shortness of breath   8. Hypokalemia     Last potassium on 3/27/2024 3.8 and stable   9. Moderate dementia without behavioral disturbance, psychotic disturbance, mood disturbance, or anxiety, unspecified dementia type ([facility])     Moderate to advanced dementia  Requires redirection  Monitor mental status  Baseline orientation is alert and oriented x 1   10. Hypophosphatemia     Last phosphorus level 2.6 on 3/27/2024  k-Phos-Neutra tab 2 tablets p.o. 4 times daily x 1 day  This was sent to patient's pharmacy of choice   11. Hypomagnesemia     Received magnesium oxide 400 mg daily for 14 days  Repeated on 3/27/2024 1.9  Periodically

## 2024-04-06 ENCOUNTER — HOSPITAL ENCOUNTER (EMERGENCY)
Facility: HOSPITAL | Age: 84
Discharge: NURSING, REHAB OR LONG-TERM CARE | End: 2024-04-06
Attending: STUDENT IN AN ORGANIZED HEALTH CARE EDUCATION/TRAINING PROGRAM
Payer: MEDICARE

## 2024-04-06 ENCOUNTER — APPOINTMENT (OUTPATIENT)
Facility: HOSPITAL | Age: 84
End: 2024-04-06
Payer: MEDICARE

## 2024-04-06 VITALS
TEMPERATURE: 97.4 F | SYSTOLIC BLOOD PRESSURE: 117 MMHG | DIASTOLIC BLOOD PRESSURE: 90 MMHG | OXYGEN SATURATION: 98 % | RESPIRATION RATE: 12 BRPM | HEART RATE: 76 BPM

## 2024-04-06 DIAGNOSIS — R55 SYNCOPE AND COLLAPSE: Primary | ICD-10-CM

## 2024-04-06 LAB
ALBUMIN SERPL-MCNC: 2.6 G/DL (ref 3.5–5)
ALBUMIN/GLOB SERPL: 0.6 (ref 1.1–2.2)
ALP SERPL-CCNC: 86 U/L (ref 45–117)
ALT SERPL-CCNC: 13 U/L (ref 12–78)
ANION GAP SERPL CALC-SCNC: 5 MMOL/L (ref 5–15)
AST SERPL-CCNC: 15 U/L (ref 15–37)
BASOPHILS # BLD: 0.1 K/UL (ref 0–0.1)
BASOPHILS NFR BLD: 2 % (ref 0–1)
BILIRUB SERPL-MCNC: 0.8 MG/DL (ref 0.2–1)
BUN SERPL-MCNC: 16 MG/DL (ref 6–20)
BUN/CREAT SERPL: 15 (ref 12–20)
CALCIUM SERPL-MCNC: 8.8 MG/DL (ref 8.5–10.1)
CHLORIDE SERPL-SCNC: 110 MMOL/L (ref 97–108)
CO2 SERPL-SCNC: 25 MMOL/L (ref 21–32)
CREAT SERPL-MCNC: 1.1 MG/DL (ref 0.7–1.3)
DIFFERENTIAL METHOD BLD: ABNORMAL
EOSINOPHIL # BLD: 0.3 K/UL (ref 0–0.4)
EOSINOPHIL NFR BLD: 7 % (ref 0–7)
ERYTHROCYTE [DISTWIDTH] IN BLOOD BY AUTOMATED COUNT: 15.3 % (ref 11.5–14.5)
GLOBULIN SER CALC-MCNC: 4.2 G/DL (ref 2–4)
GLUCOSE SERPL-MCNC: 141 MG/DL (ref 65–100)
HCT VFR BLD AUTO: 38.8 % (ref 36.6–50.3)
HGB BLD-MCNC: 11.7 G/DL (ref 12.1–17)
IMM GRANULOCYTES # BLD AUTO: 0 K/UL (ref 0–0.04)
IMM GRANULOCYTES NFR BLD AUTO: 0 % (ref 0–0.5)
LYMPHOCYTES # BLD: 1.4 K/UL (ref 0.8–3.5)
LYMPHOCYTES NFR BLD: 34 % (ref 12–49)
MCH RBC QN AUTO: 29 PG (ref 26–34)
MCHC RBC AUTO-ENTMCNC: 30.2 G/DL (ref 30–36.5)
MCV RBC AUTO: 96.3 FL (ref 80–99)
MONOCYTES # BLD: 0.3 K/UL (ref 0–1)
MONOCYTES NFR BLD: 8 % (ref 5–13)
NEUTS SEG # BLD: 2 K/UL (ref 1.8–8)
NEUTS SEG NFR BLD: 49 % (ref 32–75)
NRBC # BLD: 0 K/UL (ref 0–0.01)
NRBC BLD-RTO: 0 PER 100 WBC
PLATELET # BLD AUTO: 391 K/UL (ref 150–400)
PMV BLD AUTO: 9.4 FL (ref 8.9–12.9)
POTASSIUM SERPL-SCNC: 3.5 MMOL/L (ref 3.5–5.1)
PROT SERPL-MCNC: 6.8 G/DL (ref 6.4–8.2)
RBC # BLD AUTO: 4.03 M/UL (ref 4.1–5.7)
SODIUM SERPL-SCNC: 140 MMOL/L (ref 136–145)
WBC # BLD AUTO: 4 K/UL (ref 4.1–11.1)

## 2024-04-06 PROCEDURE — 96360 HYDRATION IV INFUSION INIT: CPT

## 2024-04-06 PROCEDURE — 36415 COLL VENOUS BLD VENIPUNCTURE: CPT

## 2024-04-06 PROCEDURE — 71045 X-RAY EXAM CHEST 1 VIEW: CPT

## 2024-04-06 PROCEDURE — 99284 EMERGENCY DEPT VISIT MOD MDM: CPT

## 2024-04-06 PROCEDURE — 80053 COMPREHEN METABOLIC PANEL: CPT

## 2024-04-06 PROCEDURE — 85025 COMPLETE CBC W/AUTO DIFF WBC: CPT

## 2024-04-06 PROCEDURE — 2580000003 HC RX 258: Performed by: STUDENT IN AN ORGANIZED HEALTH CARE EDUCATION/TRAINING PROGRAM

## 2024-04-06 RX ORDER — 0.9 % SODIUM CHLORIDE 0.9 %
1000 INTRAVENOUS SOLUTION INTRAVENOUS ONCE
Status: COMPLETED | OUTPATIENT
Start: 2024-04-06 | End: 2024-04-06

## 2024-04-06 RX ADMIN — SODIUM CHLORIDE 1000 ML: 9 INJECTION, SOLUTION INTRAVENOUS at 12:37

## 2024-04-06 ASSESSMENT — PAIN - FUNCTIONAL ASSESSMENT: PAIN_FUNCTIONAL_ASSESSMENT: 0-10

## 2024-04-06 ASSESSMENT — PAIN DESCRIPTION - PAIN TYPE: TYPE: CHRONIC PAIN

## 2024-04-06 ASSESSMENT — PAIN DESCRIPTION - FREQUENCY: FREQUENCY: CONTINUOUS

## 2024-04-06 ASSESSMENT — PAIN DESCRIPTION - LOCATION: LOCATION: BACK

## 2024-04-06 ASSESSMENT — PAIN SCALES - GENERAL: PAINLEVEL_OUTOF10: 5

## 2024-04-06 NOTE — ED NOTES
Pt's IV removed. Pt provided D/C instructions and states understanding of D/C teaching. Pt has all belongings. Pt ambulates self Patient is A&Ox4, on RA, and is in NAD at the time of discharge.

## 2024-04-07 NOTE — ED PROVIDER NOTES
[facility] EMERGENCY DEPT  EMERGENCY DEPARTMENT ENCOUNTER       Pt Name: Karson Rodriguez  MRN: [id number]  Birthdate 1940  Date of evaluation: 4/6/2024  Provider: Damien Carlos MD   PCP: Sybil Manuel APRN - NP  Note Started: 9:37 AM EDT 4/7/24     CHIEF COMPLAINT       Chief Complaint   Patient presents with    Loss of Consciousness     Pt arrives via EMS from [facility] w CC of syncope episode per [facility] Staff, pt slumped over in chair, did not hit head. Pt A&Ox4. Pt complaining of back pain    Back Pain        HISTORY OF PRESENT ILLNESS: 1 or more elements      History From: EMS and Nursing Home/SNF/Rehab Center  HPI Limitations: Altered Mental Status     Karson Rodriguez is a 84 y.o. male who presents from [facility] with complaints of a possible syncopal episode.  Patient was seated, staff states that he slumped over in his chair.  He was briefly unresponsive and EMS was called.  EMS reports when they arrived he was awake.  No falls or injuries.  Did not hit his head.  Patient reporting intermittent back pain.  Per staff the patient has confusion at baseline and is at his baseline currently.  No other complaints.         Nursing Notes were all reviewed and agreed with or any disagreements were addressed in the HPI.     REVIEW OF SYSTEMS      Review of Systems     Positives and Pertinent negatives as per HPI.    PAST HISTORY     Past Medical History:  Past Medical History:   Diagnosis Date    Aneurysm (HCC)     aortic    Arrhythmia     A-fib    Calculus of kidney     Cancer (HCC)     Chronic kidney disease     stone    Diabetes (HCC)     Fast heart beat     Former tobacco use     Hypertension     Muscle weakness     Prostate disorder     Stomach pain          Past Surgical History:  Past Surgical History:   Procedure Laterality Date    ORTHOPEDIC SURGERY      neck surgery    OTHER SURGICAL HISTORY  1/6/2015    Whipple procedure, G tube, J tube for duodenal cancer    WI UNLISTED PROCEDURE ABDOMEN

## 2024-08-27 ENCOUNTER — APPOINTMENT (OUTPATIENT)
Facility: HOSPITAL | Age: 84
End: 2024-08-27
Payer: MEDICARE

## 2024-08-27 ENCOUNTER — HOSPITAL ENCOUNTER (EMERGENCY)
Facility: HOSPITAL | Age: 84
Discharge: HOME | End: 2024-08-27
Payer: MEDICARE

## 2024-08-27 VITALS
HEART RATE: 70 BPM | DIASTOLIC BLOOD PRESSURE: 110 MMHG | RESPIRATION RATE: 11 BRPM | WEIGHT: 180 LBS | SYSTOLIC BLOOD PRESSURE: 144 MMHG | BODY MASS INDEX: 26.66 KG/M2 | OXYGEN SATURATION: 100 % | HEIGHT: 69 IN | TEMPERATURE: 97.8 F

## 2024-08-27 DIAGNOSIS — E86.0 DEHYDRATION: Primary | ICD-10-CM

## 2024-08-27 DIAGNOSIS — R53.83 FATIGUE, UNSPECIFIED TYPE: ICD-10-CM

## 2024-08-27 LAB
ALBUMIN SERPL-MCNC: 3.4 G/DL (ref 3.5–5)
ALBUMIN/GLOB SERPL: 0.8 (ref 1.1–2.2)
ALP SERPL-CCNC: 82 U/L (ref 45–117)
ALT SERPL-CCNC: 20 U/L (ref 12–78)
ANION GAP SERPL CALC-SCNC: 8 MMOL/L (ref 5–15)
AST SERPL-CCNC: 15 U/L (ref 15–37)
BASE EXCESS BLDV CALC-SCNC: 0.4 MMOL/L
BASOPHILS # BLD: 0.1 K/UL (ref 0–0.1)
BASOPHILS NFR BLD: 1 % (ref 0–1)
BILIRUB SERPL-MCNC: 0.4 MG/DL (ref 0.2–1)
BUN SERPL-MCNC: 26 MG/DL (ref 6–20)
BUN/CREAT SERPL: 19 (ref 12–20)
CALCIUM SERPL-MCNC: 9.3 MG/DL (ref 8.5–10.1)
CHLORIDE SERPL-SCNC: 104 MMOL/L (ref 97–108)
CO2 SERPL-SCNC: 24 MMOL/L (ref 21–32)
COMMENT:: NORMAL
CREAT SERPL-MCNC: 1.35 MG/DL (ref 0.7–1.3)
DIFFERENTIAL METHOD BLD: ABNORMAL
EOSINOPHIL # BLD: 0.1 K/UL (ref 0–0.4)
EOSINOPHIL NFR BLD: 4 % (ref 0–7)
ERYTHROCYTE [DISTWIDTH] IN BLOOD BY AUTOMATED COUNT: 15.2 % (ref 11.5–14.5)
FLUAV RNA SPEC QL NAA+PROBE: NOT DETECTED
FLUBV RNA SPEC QL NAA+PROBE: NOT DETECTED
GLOBULIN SER CALC-MCNC: 4.1 G/DL (ref 2–4)
GLUCOSE SERPL-MCNC: 173 MG/DL (ref 65–100)
HCO3 BLDV-SCNC: 28.5 MMOL/L (ref 23–28)
HCT VFR BLD AUTO: 36 % (ref 36.6–50.3)
HGB BLD-MCNC: 11.2 G/DL (ref 12.1–17)
IMM GRANULOCYTES # BLD AUTO: 0 K/UL (ref 0–0.04)
IMM GRANULOCYTES NFR BLD AUTO: 0 % (ref 0–0.5)
LYMPHOCYTES # BLD: 1.7 K/UL (ref 0.8–3.5)
LYMPHOCYTES NFR BLD: 45 % (ref 12–49)
MAGNESIUM SERPL-MCNC: 2.2 MG/DL (ref 1.6–2.4)
MCH RBC QN AUTO: 29.6 PG (ref 26–34)
MCHC RBC AUTO-ENTMCNC: 31.1 G/DL (ref 30–36.5)
MCV RBC AUTO: 95 FL (ref 80–99)
MONOCYTES # BLD: 0.4 K/UL (ref 0–1)
MONOCYTES NFR BLD: 11 % (ref 5–13)
NEUTS SEG # BLD: 1.4 K/UL (ref 1.8–8)
NEUTS SEG NFR BLD: 39 % (ref 32–75)
NRBC # BLD: 0 K/UL (ref 0–0.01)
NRBC BLD-RTO: 0 PER 100 WBC
PCO2 BLDV: 57.4 MMHG (ref 41–51)
PH BLDV: 7.31 (ref 7.32–7.42)
PLATELET # BLD AUTO: 232 K/UL (ref 150–400)
PMV BLD AUTO: 10.1 FL (ref 8.9–12.9)
PO2 BLDV: <27 MMHG (ref 25–40)
POTASSIUM SERPL-SCNC: 4.1 MMOL/L (ref 3.5–5.1)
PROT SERPL-MCNC: 7.5 G/DL (ref 6.4–8.2)
RBC # BLD AUTO: 3.79 M/UL (ref 4.1–5.7)
SARS-COV-2 RNA RESP QL NAA+PROBE: NOT DETECTED
SERVICE CMNT-IMP: ABNORMAL
SODIUM SERPL-SCNC: 136 MMOL/L (ref 136–145)
SOURCE: NORMAL
SPECIMEN HOLD: NORMAL
SPECIMEN TYPE: ABNORMAL
TROPONIN I SERPL HS-MCNC: 7 NG/L (ref 0–76)
WBC # BLD AUTO: 3.7 K/UL (ref 4.1–11.1)

## 2024-08-27 PROCEDURE — 85025 COMPLETE CBC W/AUTO DIFF WBC: CPT

## 2024-08-27 PROCEDURE — 36415 COLL VENOUS BLD VENIPUNCTURE: CPT

## 2024-08-27 PROCEDURE — 80053 COMPREHEN METABOLIC PANEL: CPT

## 2024-08-27 PROCEDURE — 84484 ASSAY OF TROPONIN QUANT: CPT

## 2024-08-27 PROCEDURE — 71045 X-RAY EXAM CHEST 1 VIEW: CPT

## 2024-08-27 PROCEDURE — 82803 BLOOD GASES ANY COMBINATION: CPT

## 2024-08-27 PROCEDURE — 99285 EMERGENCY DEPT VISIT HI MDM: CPT

## 2024-08-27 PROCEDURE — 87636 SARSCOV2 & INF A&B AMP PRB: CPT

## 2024-08-27 PROCEDURE — 70450 CT HEAD/BRAIN W/O DYE: CPT

## 2024-08-27 PROCEDURE — 2580000003 HC RX 258

## 2024-08-27 PROCEDURE — 83735 ASSAY OF MAGNESIUM: CPT

## 2024-08-27 PROCEDURE — 93005 ELECTROCARDIOGRAM TRACING: CPT

## 2024-08-27 PROCEDURE — 96360 HYDRATION IV INFUSION INIT: CPT

## 2024-08-27 RX ORDER — SODIUM CHLORIDE, SODIUM LACTATE, POTASSIUM CHLORIDE, AND CALCIUM CHLORIDE .6; .31; .03; .02 G/100ML; G/100ML; G/100ML; G/100ML
1000 INJECTION, SOLUTION INTRAVENOUS ONCE
Status: COMPLETED | OUTPATIENT
Start: 2024-08-27 | End: 2024-08-27

## 2024-08-27 RX ADMIN — SODIUM CHLORIDE, POTASSIUM CHLORIDE, SODIUM LACTATE AND CALCIUM CHLORIDE 1000 ML: 600; 310; 30; 20 INJECTION, SOLUTION INTRAVENOUS at 10:36

## 2024-08-27 ASSESSMENT — PAIN - FUNCTIONAL ASSESSMENT: PAIN_FUNCTIONAL_ASSESSMENT: NONE - DENIES PAIN

## 2024-08-27 NOTE — ED NOTES
H2h here to tx patient back to [facility]. Contacted pt's son brandyn and made him aware.   IV taken out w/o difficulty.    Render Post-Care Instructions In Note?: no Post-Care Instructions: I reviewed with the patient in detail post-care instructions. Patient is to wear sunprotection, and avoid picking at any of the treated lesions. Pt may apply Vaseline to crusted or scabbing areas. Duration Of Freeze Thaw-Cycle (Seconds): 0 Detail Level: Detailed Number Of Freeze-Thaw Cycles: 2 freeze-thaw cycles Consent: The patient's consent was obtained including but not limited to risks of crusting, scabbing, blistering, scarring, darker or lighter pigmentary change, recurrence, incomplete removal and infection.

## 2024-08-27 NOTE — DISCHARGE INSTRUCTIONS
32 - 75 %    Lymphocytes % 45 12 - 49 %    Monocytes % 11 5 - 13 %    Eosinophils % 4 0 - 7 %    Basophils % 1 0 - 1 %    Immature Granulocytes % 0 0.0 - 0.5 %    Neutrophils Absolute 1.4 (L) 1.8 - 8.0 K/UL    Lymphocytes Absolute 1.7 0.8 - 3.5 K/UL    Monocytes Absolute 0.4 0.0 - 1.0 K/UL    Eosinophils Absolute 0.1 0.0 - 0.4 K/UL    Basophils Absolute 0.1 0.0 - 0.1 K/UL    Immature Granulocytes Absolute 0.0 0.00 - 0.04 K/UL    Differential Type AUTOMATED     Comprehensive Metabolic Panel    Collection Time: 08/27/24  9:08 AM   Result Value Ref Range    Sodium 136 136 - 145 mmol/L    Potassium 4.1 3.5 - 5.1 mmol/L    Chloride 104 97 - 108 mmol/L    CO2 24 21 - 32 mmol/L    Anion Gap 8 5 - 15 mmol/L    Glucose 173 (H) 65 - 100 mg/dL    BUN 26 (H) 6 - 20 MG/DL    Creatinine 1.35 (H) 0.70 - 1.30 MG/DL    BUN/Creatinine Ratio 19 12 - 20      Est, Glom Filt Rate 51 (L) >60 ml/min/1.73m2    Calcium 9.3 8.5 - 10.1 MG/DL    Total Bilirubin 0.4 0.2 - 1.0 MG/DL    ALT 20 12 - 78 U/L    AST 15 15 - 37 U/L    Alk Phosphatase 82 45 - 117 U/L    Total Protein 7.5 6.4 - 8.2 g/dL    Albumin 3.4 (L) 3.5 - 5.0 g/dL    Globulin 4.1 (H) 2.0 - 4.0 g/dL    Albumin/Globulin Ratio 0.8 (L) 1.1 - 2.2     COVID-19 & Influenza Combo    Collection Time: 08/27/24  9:08 AM    Specimen: Nasopharyngeal   Result Value Ref Range    Source Nasopharyngeal      SARS-CoV-2, PCR Not detected NOTD      Rapid Influenza A By PCR Not detected NOTD      Rapid Influenza B By PCR Not detected NOTD     Troponin    Collection Time: 08/27/24  9:08 AM   Result Value Ref Range    Troponin, High Sensitivity 7 0 - 76 ng/L   Magnesium    Collection Time: 08/27/24  9:08 AM   Result Value Ref Range    Magnesium 2.2 1.6 - 2.4 mg/dL   Extra Tubes Hold    Collection Time: 08/27/24  9:10 AM   Result Value Ref Range    Specimen HOld RED     Comment:        Add-on orders for these samples will be processed based on acceptable specimen integrity and analyte stability, which may  vary by analyte.   Venous Blood Gas, POC    Collection Time: 08/27/24  9:52 AM   Result Value Ref Range    PH, VENOUS (POC) 7.31 (L) 7.32 - 7.42      PCO2, [place], POC 57.4 (H) 41 - 51 MMHG    PO2, VENOUS (POC) <27 25 - 40 mmHg    HCO3, Venous 28.5 (H) 23.0 - 28.0 MMOL/L    BASE EXCESS, VENOUS (POC) 0.4 mmol/L    Specimen type: VENOUS BLOOD      Performed by: Curt Graham \"Nile\"      CT HEAD WO CONTRAST   Final Result   No acute findings.      Electronically signed by DANIELLE PARISH MD      XR CHEST PORTABLE   Final Result      No acute process on portable chest.         Electronically signed by DANIELLE PARISH MD          The exam and treatment you received in the Emergency Department were for an urgent problem and are not intended as complete care. It is important that you follow up with a doctor, nurse practitioner, or physician assistant for ongoing care. If your symptoms become worse or you do not improve as expected and you are unable to reach your usual health care provider, you should return to the Emergency Department. We are available 24 hours a day.    Please take your discharge instructions with you when you go to your follow-up appointment.     If a prescription has been provided, please have it filled as soon as possible to prevent a delay in treatment. Read the entire medication instruction sheet provided to you by the pharmacy. If you have any questions or reservations about taking the medication due to side effects or interactions with other medications, please call your primary care physician or contact the ER to speak with the charge nurse.     Please make an appointment with your family doctor or the physician you were referred to for follow-up of this visit as instructed on your discharge paperwork. Return to the ER if you are unable to be seen or if you are unable to be seen in a timely manner.    Should you experience abdominal pain lasting greater than 6 hours, chest pain, difficulty breathing,

## 2024-08-27 NOTE — ED TRIAGE NOTES
Pt to er via ems from [facility] and rehab after being difficult to arouse by nursing staff this am. Per ems upon arrival pt was at his baseline. Hx of dementia, a+ox2. Denies any pain, chest pain, sob, nausea , vomiting, cough, chills or fever.

## 2024-08-27 NOTE — ED PROVIDER NOTES
[facility] EMERGENCY DEPT  EMERGENCY DEPARTMENT ENCOUNTER    Patient Name: Karson Rodriguez  MRN: [id number]  YOB: 1940  Provider: Eliot Mckeon MD  PCP: Sybil Manuel APRN - NP  Time/Date of evaluation: 8:42 AM EDT on 8/27/24    History of Presenting Illness     Chief Complaint   Patient presents with    Fatigue     History Provided by: Patient   History is limited by: Nothing    HISTORY (Narrative):   Karson Rodriguez is a 84 y.o. male with history listed below presenting to the ER for difficulty to arouse this morning.  EMS was called to the house because the family had difficulty waking him up.  EMS notes that by the time they arrived patient seemed to be at his baseline which is oriented x 2.  Patient denies chest pain, shortness of breath, abdominal pain, cough, runny nose, fever, chills, or any other associated sx.       Nursing Notes were all reviewed and agreed with or any disagreements were addressed in the HPI.    Past History     PAST MEDICAL HISTORY:  Past Medical History:   Diagnosis Date    Aneurysm (HCC)     aortic    Arrhythmia     A-fib    Calculus of kidney     Cancer (HCC)     Chronic kidney disease     stone    Diabetes (HCC)     Fast heart beat     Former tobacco use     Hypertension     Muscle weakness     Prostate disorder     Stomach pain        PAST SURGICAL HISTORY:  Past Surgical History:   Procedure Laterality Date    ORTHOPEDIC SURGERY      neck surgery    OTHER SURGICAL HISTORY  1/6/2015    Whipple procedure, G tube, J tube for duodenal cancer    ND UNLISTED PROCEDURE ABDOMEN PERITONEUM & OMENTUM      Whipple Procedure    UPPER GI ENDOSCOPY,BALL DIL,30MM  12/2/2014         UPPER GI ENDOSCOPY,BIOPSY  12/2/2014         UPPER GI ENDOSCOPY,BIOPSY  11/26/2014         UPPER GI ENDOSCOPY,DIAGNOSIS  1/23/2015            FAMILY HISTORY:  Family History   Problem Relation Age of Onset    Diabetes Brother     Cancer Mother     Stroke Father     Cancer Brother     Heart Disease Mother      discussed, understanding conveyed, and agreed upon. The patient is to follow up as recommended or return to ER should their symptoms worsen.     PATIENT REFERRED TO:    Kaleb Sybil PORTILLO, APRN - NP  [address] [facility]  [address] [facility] [address] [phone]    In 1 week      [facility] EMERGENCY DEPT  [address] [address]  [phone]    If symptoms worsen      DISCHARGE MEDICATIONS:       Medication List        ASK your doctor about these medications      acetaminophen 500 MG tablet  Commonly known as: TYLENOL  Take 2 tablets by mouth in the morning and at bedtime     apixaban 5 MG Tabs tablet  Commonly known as: ELIQUIS  Take 1 tablet by mouth 2 times daily     aspirin 81 MG chewable tablet  Take 1 tablet by mouth daily     omeprazole 20 MG delayed release capsule  Commonly known as: PRILOSEC  Take 1 capsule by mouth daily     phosphorus [id number] MG tablet  Commonly known as: K PHOS NEUTRAL  Take 2 tablets by mouth in the morning, at noon, in the evening, and at bedtime for 1 day     pioglitazone 15 MG tablet  Commonly known as: ACTOS  Take 2 tablets by mouth daily     simvastatin 40 MG tablet  Commonly known as: ZOCOR  Take 1 tablet by mouth nightly              DISCONTINUED MEDICATIONS:    Discharge Medication List as of 8/27/2024 11:40 AM          (Please note that parts of this dictation were completed with voice recognition software. Quite often unanticipated grammatical, syntax, homophones, and other interpretive errors are inadvertently transcribed by the computer software. Please disregards these errors. Please excuse any errors that have escaped final proofreading.)    I am the Primary Clinician of Record.   MD Mike Graves Zachary A, MD  08/27/24 1032       Eliot Mckeon MD  08/27/24 1305

## 2024-08-28 LAB
EKG DIAGNOSIS: NORMAL
EKG Q-T INTERVAL: 444 MS
EKG QRS DURATION: 76 MS
EKG QTC CALCULATION (BAZETT): 475 MS
EKG R AXIS: 23 DEGREES
EKG T AXIS: 39 DEGREES
EKG VENTRICULAR RATE: 69 BPM

## 2024-11-17 ENCOUNTER — APPOINTMENT (OUTPATIENT)
Facility: HOSPITAL | Age: 84
End: 2024-11-17
Payer: MEDICARE

## 2024-11-17 ENCOUNTER — HOSPITAL ENCOUNTER (EMERGENCY)
Facility: HOSPITAL | Age: 84
Discharge: HOME | End: 2024-11-17
Payer: MEDICARE

## 2024-11-17 VITALS
BODY MASS INDEX: 30.34 KG/M2 | RESPIRATION RATE: 18 BRPM | HEART RATE: 78 BPM | WEIGHT: 205.47 LBS | DIASTOLIC BLOOD PRESSURE: 90 MMHG | OXYGEN SATURATION: 98 % | SYSTOLIC BLOOD PRESSURE: 180 MMHG | TEMPERATURE: 98.3 F

## 2024-11-17 DIAGNOSIS — I10 ESSENTIAL HYPERTENSION: ICD-10-CM

## 2024-11-17 DIAGNOSIS — R09.81 NASAL CONGESTION: Primary | ICD-10-CM

## 2024-11-17 LAB
FLUAV RNA SPEC QL NAA+PROBE: NOT DETECTED
FLUBV RNA SPEC QL NAA+PROBE: NOT DETECTED
SARS-COV-2 RNA RESP QL NAA+PROBE: NOT DETECTED
SOURCE: NORMAL

## 2024-11-17 PROCEDURE — 99284 EMERGENCY DEPT VISIT MOD MDM: CPT

## 2024-11-17 PROCEDURE — 71045 X-RAY EXAM CHEST 1 VIEW: CPT

## 2024-11-17 PROCEDURE — 87636 SARSCOV2 & INF A&B AMP PRB: CPT

## 2024-11-17 PROCEDURE — 6370000000 HC RX 637 (ALT 250 FOR IP): Performed by: PHYSICIAN ASSISTANT

## 2024-11-17 RX ORDER — FLUTICASONE PROPIONATE 50 MCG
1 SPRAY, SUSPENSION (ML) NASAL DAILY
Qty: 16 G | Refills: 0 | Status: SHIPPED | OUTPATIENT
Start: 2024-11-17

## 2024-11-17 RX ORDER — OXYMETAZOLINE HYDROCHLORIDE 0.05 G/100ML
2 SPRAY NASAL
Status: COMPLETED | OUTPATIENT
Start: 2024-11-17 | End: 2024-11-17

## 2024-11-17 RX ADMIN — OXYMETAZOLINE HYDROCHLORIDE 2 SPRAY: 0.5 SPRAY NASAL at 16:18

## 2024-11-17 ASSESSMENT — LIFESTYLE VARIABLES
HOW OFTEN DO YOU HAVE A DRINK CONTAINING ALCOHOL: NEVER
HOW MANY STANDARD DRINKS CONTAINING ALCOHOL DO YOU HAVE ON A TYPICAL DAY: PATIENT DOES NOT DRINK

## 2024-11-17 NOTE — DISCHARGE INSTRUCTIONS
Thank You!    It was a pleasure taking care of you in our Emergency Department today. We know that when you come to our Emergency Department, you are entrusting us with your health, comfort, and safety. Our physicians and nurses honor that trust, and truly appreciate the opportunity to care for you and your loved ones.      We also value your feedback. If you receive a survey about your Emergency Department experience today, please fill it out.  We care about our patients' feedback, and we listen to what you have to say.  Thank you.    Bartolo Krause PA-C      ________________________________________________________________________  I have included a copy of your lab results and/or radiologic studies from today's visit so you can have them easily available at your follow-up visit. We hope you feel better and please do not hesitate to contact the ED if you have any questions at all!    Recent Results (from the past 12 hour(s))   COVID-19 & Influenza Combo    Collection Time: 11/17/24  4:07 PM    Specimen: Nasopharyngeal   Result Value Ref Range    Source Nasopharyngeal      SARS-CoV-2, PCR Not detected NOTD      Rapid Influenza A By PCR Not detected NOTD      Rapid Influenza B By PCR Not detected NOTD         XR CHEST PORTABLE   Final Result   No acute cardiopulmonary abnormalities.         Electronically signed by MICHAEL Silverio        [unfilled]  The exam and treatment you received in the Emergency Department were for an urgent problem and are not intended as complete care. It is important that you follow up with a doctor, nurse practitioner, or physician assistant for ongoing care. If your symptoms become worse or you do not improve as expected and you are unable to reach your usual health care provider, you should return to the Emergency Department. We are available 24 hours a day.    Please take your discharge instructions with you when you go to your follow-up appointment.     If a prescription has been provided,

## 2024-11-17 NOTE — ED PROVIDER NOTES
His belly is soft.    His nose is slightly congested    Will offer oxymetazoline now for his nasal congestion  Will obtain rapid testing for COVID and flu  Will obtain chest x-ray to evaluate for pneumonia or acute process      ED Course as of 11/17/24 2042   Sun Nov 17, 2024   1705 Rapid testing for flu and for COVID is negative.  Chest x-ray is negative.  Patient did receive some nasal Afrin and his congestion has improved somewhat. [EJ]   1715 He tells me he does have a scratchy throat.  I bring him some ice water and he drinks a little and tells me his throat feels better. [EJ]   1716 Believe safe for discharge with primary care referral and follow-up for worsening or any concerns. [EJ]      ED Course User Index  [EJ] Bartolo Krause PA       Disposition Considerations (Tests not done, Shared Decision Making, Pt Expectation of Test or Tx.):      FINAL IMPRESSION     1. Nasal congestion    2. Essential hypertension          DISPOSITION/PLAN   DISPOSITION Decision To Discharge 11/17/2024 05:37:27 PM         Discharge Note: The patient is stable for discharge home. The signs, symptoms, diagnosis, and discharge instructions have been discussed, understanding conveyed, and agreed upon. The patient is to follow up as recommended or return to ER should their symptoms worsen.      PATIENT REFERRED TO:  Sybil Manuel, APRN - NP  [address] [facility]  [address] [facility] [address] [phone]    Call   PRIMARY CARE: as needed regarding your ER visit and high blood pressure       DISCHARGE MEDICATIONS:     Medication List        START taking these medications      fluticasone 50 MCG/ACT nasal spray  Commonly known as: FLONASE  1 spray by Each Nostril route daily            ASK your doctor about these medications      acetaminophen 500 MG tablet  Commonly known as: TYLENOL  Take 2 tablets by mouth in the morning and at bedtime     apixaban 5 MG Tabs tablet  Commonly known as: ELIQUIS  Take 1 tablet by mouth 2 times daily     aspirin

## 2025-03-20 ENCOUNTER — HOSPITAL ENCOUNTER (EMERGENCY)
Facility: HOSPITAL | Age: 85
Discharge: HOME | End: 2025-03-20
Attending: EMERGENCY MEDICINE
Payer: MEDICARE

## 2025-03-20 ENCOUNTER — APPOINTMENT (OUTPATIENT)
Facility: HOSPITAL | Age: 85
End: 2025-03-20
Payer: MEDICARE

## 2025-03-20 VITALS
HEART RATE: 72 BPM | BODY MASS INDEX: 26.51 KG/M2 | RESPIRATION RATE: 17 BRPM | OXYGEN SATURATION: 99 % | WEIGHT: 179.01 LBS | HEIGHT: 69 IN | SYSTOLIC BLOOD PRESSURE: 189 MMHG | DIASTOLIC BLOOD PRESSURE: 75 MMHG | TEMPERATURE: 98.1 F

## 2025-03-20 DIAGNOSIS — R10.84 GENERALIZED ABDOMINAL PAIN: Primary | ICD-10-CM

## 2025-03-20 LAB
ALBUMIN SERPL-MCNC: 3.1 G/DL (ref 3.5–5)
ALBUMIN/GLOB SERPL: 0.7 (ref 1.1–2.2)
ALP SERPL-CCNC: 78 U/L (ref 45–117)
ALT SERPL-CCNC: 16 U/L (ref 12–78)
ANION GAP SERPL CALC-SCNC: 5 MMOL/L (ref 2–12)
AST SERPL-CCNC: 13 U/L (ref 15–37)
BASOPHILS # BLD: 0.05 K/UL (ref 0–0.1)
BASOPHILS NFR BLD: 1.1 % (ref 0–1)
BILIRUB SERPL-MCNC: 0.3 MG/DL (ref 0.2–1)
BUN SERPL-MCNC: 21 MG/DL (ref 6–20)
BUN/CREAT SERPL: 16 (ref 12–20)
CALCIUM SERPL-MCNC: 8.8 MG/DL (ref 8.5–10.1)
CHLORIDE SERPL-SCNC: 108 MMOL/L (ref 97–108)
CO2 SERPL-SCNC: 25 MMOL/L (ref 21–32)
CREAT SERPL-MCNC: 1.33 MG/DL (ref 0.7–1.3)
DIFFERENTIAL METHOD BLD: ABNORMAL
EOSINOPHIL # BLD: 0.22 K/UL (ref 0–0.4)
EOSINOPHIL NFR BLD: 4.7 % (ref 0–7)
ERYTHROCYTE [DISTWIDTH] IN BLOOD BY AUTOMATED COUNT: 13.8 % (ref 11.5–14.5)
GLOBULIN SER CALC-MCNC: 4.3 G/DL (ref 2–4)
GLUCOSE SERPL-MCNC: 130 MG/DL (ref 65–100)
HCT VFR BLD AUTO: 38.9 % (ref 36.6–50.3)
HGB BLD-MCNC: 12.4 G/DL (ref 12.1–17)
IMM GRANULOCYTES # BLD AUTO: 0.02 K/UL (ref 0–0.04)
IMM GRANULOCYTES NFR BLD AUTO: 0.4 % (ref 0–0.5)
LIPASE SERPL-CCNC: 22 U/L (ref 13–75)
LYMPHOCYTES # BLD: 2.02 K/UL (ref 0.8–3.5)
LYMPHOCYTES NFR BLD: 43.5 % (ref 12–49)
MCH RBC QN AUTO: 29.5 PG (ref 26–34)
MCHC RBC AUTO-ENTMCNC: 31.9 G/DL (ref 30–36.5)
MCV RBC AUTO: 92.6 FL (ref 80–99)
MONOCYTES # BLD: 0.44 K/UL (ref 0–1)
MONOCYTES NFR BLD: 9.5 % (ref 5–13)
NEUTS SEG # BLD: 1.89 K/UL (ref 1.8–8)
NEUTS SEG NFR BLD: 40.8 % (ref 32–75)
NRBC # BLD: 0 K/UL (ref 0–0.01)
NRBC BLD-RTO: 0 PER 100 WBC
PLATELET # BLD AUTO: 277 K/UL (ref 150–400)
PMV BLD AUTO: 10 FL (ref 8.9–12.9)
POTASSIUM SERPL-SCNC: 3.5 MMOL/L (ref 3.5–5.1)
PROT SERPL-MCNC: 7.4 G/DL (ref 6.4–8.2)
RBC # BLD AUTO: 4.2 M/UL (ref 4.1–5.7)
SODIUM SERPL-SCNC: 138 MMOL/L (ref 136–145)
WBC # BLD AUTO: 4.6 K/UL (ref 4.1–11.1)

## 2025-03-20 PROCEDURE — 85025 COMPLETE CBC W/AUTO DIFF WBC: CPT

## 2025-03-20 PROCEDURE — 99285 EMERGENCY DEPT VISIT HI MDM: CPT

## 2025-03-20 PROCEDURE — 74177 CT ABD & PELVIS W/CONTRAST: CPT

## 2025-03-20 PROCEDURE — 6360000004 HC RX CONTRAST MEDICATION: Performed by: EMERGENCY MEDICINE

## 2025-03-20 PROCEDURE — 6360000002 HC RX W HCPCS: Performed by: EMERGENCY MEDICINE

## 2025-03-20 PROCEDURE — 96374 THER/PROPH/DIAG INJ IV PUSH: CPT

## 2025-03-20 PROCEDURE — 80053 COMPREHEN METABOLIC PANEL: CPT

## 2025-03-20 PROCEDURE — 2580000003 HC RX 258: Performed by: EMERGENCY MEDICINE

## 2025-03-20 PROCEDURE — 36415 COLL VENOUS BLD VENIPUNCTURE: CPT

## 2025-03-20 PROCEDURE — 83690 ASSAY OF LIPASE: CPT

## 2025-03-20 RX ORDER — IOPAMIDOL 755 MG/ML
100 INJECTION, SOLUTION INTRAVASCULAR
Status: COMPLETED | OUTPATIENT
Start: 2025-03-20 | End: 2025-03-20

## 2025-03-20 RX ORDER — 0.9 % SODIUM CHLORIDE 0.9 %
1000 INTRAVENOUS SOLUTION INTRAVENOUS ONCE
Status: COMPLETED | OUTPATIENT
Start: 2025-03-20 | End: 2025-03-20

## 2025-03-20 RX ORDER — KETOROLAC TROMETHAMINE 30 MG/ML
15 INJECTION, SOLUTION INTRAMUSCULAR; INTRAVENOUS ONCE
Status: COMPLETED | OUTPATIENT
Start: 2025-03-20 | End: 2025-03-20

## 2025-03-20 RX ORDER — DOCUSATE SODIUM 100 MG/1
100 CAPSULE, LIQUID FILLED ORAL 2 TIMES DAILY
Qty: 60 CAPSULE | Refills: 0 | Status: SHIPPED | OUTPATIENT
Start: 2025-03-20 | End: 2025-03-20

## 2025-03-20 RX ORDER — DOCUSATE SODIUM 100 MG/1
100 CAPSULE, LIQUID FILLED ORAL 2 TIMES DAILY
Qty: 60 CAPSULE | Refills: 0 | Status: SHIPPED | OUTPATIENT
Start: 2025-03-20

## 2025-03-20 RX ADMIN — KETOROLAC TROMETHAMINE 15 MG: 30 INJECTION, SOLUTION INTRAMUSCULAR; INTRAVENOUS at 14:14

## 2025-03-20 RX ADMIN — IOPAMIDOL 100 ML: 755 INJECTION, SOLUTION INTRAVENOUS at 15:25

## 2025-03-20 RX ADMIN — SODIUM CHLORIDE 1000 ML: 9 INJECTION, SOLUTION INTRAVENOUS at 14:14

## 2025-03-20 ASSESSMENT — PAIN SCALES - GENERAL
PAINLEVEL_OUTOF10: 1
PAINLEVEL_OUTOF10: 3

## 2025-03-20 ASSESSMENT — PAIN DESCRIPTION - DESCRIPTORS
DESCRIPTORS: ACHING
DESCRIPTORS: ACHING

## 2025-03-20 ASSESSMENT — PAIN DESCRIPTION - LOCATION
LOCATION: ABDOMEN
LOCATION: ABDOMEN

## 2025-03-20 NOTE — ED PROVIDER NOTES
Exclude from Growth Chart         Physical Exam  Vitals and nursing note reviewed.   Constitutional:       General: He is not in acute distress.     Appearance: He is well-developed. He is not ill-appearing.   HENT:      Head: Normocephalic and atraumatic.      Mouth/Throat:      Mouth: Mucous membranes are moist.   Eyes:      General: No scleral icterus.     Extraocular Movements: Extraocular movements intact.      Pupils: Pupils are equal, round, and reactive to light.   Cardiovascular:      Rate and Rhythm: Normal rate and regular rhythm.   Pulmonary:      Effort: Pulmonary effort is normal. No respiratory distress.      Breath sounds: Normal breath sounds. No wheezing, rhonchi or rales.   Chest:      Chest wall: No tenderness.   Abdominal:      General: There is no distension.      Palpations: Abdomen is soft.      Tenderness: There is generalized abdominal tenderness.   Musculoskeletal:         General: Normal range of motion.      Right lower leg: No edema.      Left lower leg: No edema.   Skin:     General: Skin is warm and dry.      Capillary Refill: Capillary refill takes less than 2 seconds.   Neurological:      Mental Status: He is alert and oriented to person, place, and time.      Cranial Nerves: No cranial nerve deficit.   Psychiatric:         Mood and Affect: Mood normal.         Behavior: Behavior normal.          DIAGNOSTIC RESULTS   LABS:     Recent Results (from the past 24 hours)   CBC with Auto Differential    Collection Time: 03/20/25  2:03 PM   Result Value Ref Range    WBC 4.6 4.1 - 11.1 K/uL    RBC 4.20 4.10 - 5.70 M/uL    Hemoglobin 12.4 12.1 - 17.0 g/dL    Hematocrit 38.9 36.6 - 50.3 %    MCV 92.6 80.0 - 99.0 FL    MCH 29.5 26.0 - 34.0 PG    MCHC 31.9 30.0 - 36.5 g/dL    RDW 13.8 11.5 - 14.5 %    Platelets 277 150 - 400 K/uL    MPV 10.0 8.9 - 12.9 FL    Nucleated RBCs 0.0 0  WBC    nRBC 0.00 0.00 - 0.01 K/uL    Neutrophils % 40.8 32.0 - 75.0 %    Lymphocytes % 43.5 12.0 - 49.0 %

## 2025-03-20 NOTE — ED NOTES
This RN has reviewed discharge instructions with the patient's nurse at [facility] at this time. Josefa at [facility] has been made aware of prescription(s) called into pharmacy on file for , follow up care, and diagnoses care instructions. The  Josefa at [facility]  verbalized understanding and denies any further questions. VSS and pt AOx2. Patient stretcher  out to waiting room at this time.